# Patient Record
Sex: FEMALE | Race: WHITE | Employment: FULL TIME | URBAN - METROPOLITAN AREA
[De-identification: names, ages, dates, MRNs, and addresses within clinical notes are randomized per-mention and may not be internally consistent; named-entity substitution may affect disease eponyms.]

---

## 2018-03-15 LAB
AFP, MATERNAL, EXTERNAL: NORMAL
ANTIBODY SCREEN, EXTERNAL: NORMAL
CHLAMYDIA, EXTERNAL: NORMAL
CYSTIC FIBROSIS, EXTERNAL: NORMAL
HBSAG, EXTERNAL: NORMAL
HCT, EXTERNAL: 38.2
HGB EVAL, EXTERNAL: NORMAL
HGB, EXTERNAL: 12.7
N. GONORRHEA, EXTERNAL: NORMAL
NT, EXTERNAL: NORMAL
PAP SMEAR, EXTERNAL: NORMAL
PLATELET CNT,   EXTERNAL: 166
RPR, EXTERNAL: NORMAL
RUBELLA, EXTERNAL: NORMAL
TYPE, ABO & RH, EXTERNAL: NORMAL
URINALYSIS, EXTERNAL: NORMAL

## 2018-06-18 ENCOUNTER — OFFICE VISIT (OUTPATIENT)
Dept: OBGYN CLINIC | Age: 28
End: 2018-06-18

## 2018-06-18 VITALS
HEIGHT: 61 IN | BODY MASS INDEX: 31.98 KG/M2 | SYSTOLIC BLOOD PRESSURE: 116 MMHG | DIASTOLIC BLOOD PRESSURE: 60 MMHG | WEIGHT: 169.4 LBS

## 2018-06-18 DIAGNOSIS — Z34.00 SUPERVISION OF NORMAL FIRST PREGNANCY, ANTEPARTUM: ICD-10-CM

## 2018-06-18 DIAGNOSIS — Z34.02 ENCOUNTER FOR SUPERVISION OF NORMAL FIRST PREGNANCY IN SECOND TRIMESTER: Primary | ICD-10-CM

## 2018-06-18 RX ORDER — HYDROGEN PEROXIDE 3 %
SOLUTION, NON-ORAL MISCELLANEOUS DAILY
COMMUNITY
End: 2019-06-17

## 2018-06-18 NOTE — PATIENT INSTRUCTIONS
Weeks 22 to 26 of Your Pregnancy: Care Instructions  Your Care Instructions    As you enter your 7th month of pregnancy at week 26, your baby's lungs are growing stronger and getting ready to breathe. You may notice that your baby responds to the sound of your or your partner's voice. You may also notice that your baby does less turning and twisting and more squirming or jerking. Jerking often means that your baby has the hiccups. Hiccups are perfectly normal and are only temporary. You may want to think about attending a childbirth preparation class. This is also a good time to start thinking about whether you want to have pain medicine during labor. Most pregnant women are tested for gestational diabetes between weeks 25 and 28. Gestational diabetes occurs when your blood sugar level gets too high when you're pregnant. The test is important, because you can have gestational diabetes and not know it. But the condition can cause problems for your baby. Follow-up care is a key part of your treatment and safety. Be sure to make and go to all appointments, and call your doctor if you are having problems. It's also a good idea to know your test results and keep a list of the medicines you take. How can you care for yourself at home? Ease discomfort from your baby's kicking  · Change your position. Sometimes this will cause your baby to change position too. · Take a deep breath while you raise your arm over your head. Then breathe out while you drop your arm. Do Kegel exercises to prevent urine from leaking  · You can do Kegel exercises while you stand or sit. ¨ Squeeze the same muscles you would use to stop your urine. Your belly and thighs should not move. ¨ Hold the squeeze for 3 seconds, and then relax for 3 seconds. ¨ Start with 3 seconds. Then add 1 second each week until you are able to squeeze for 10 seconds. ¨ Repeat the exercise 10 to 15 times for each session.  Do three or more sessions each day.  Ease or reduce swelling in your feet, ankles, hands, and fingers  · If your fingers are puffy, take off your rings. · Do not eat high-salt foods, such as potato chips. · Prop up your feet on a stool or couch as much as possible. Sleep with pillows under your feet. · Do not stand for long periods of time or wear tight shoes. · Wear support stockings. Where can you learn more? Go to http://valerie-quique.info/. Enter G264 in the search box to learn more about \"Weeks 22 to 26 of Your Pregnancy: Care Instructions. \"  Current as of: March 16, 2017  Content Version: 11.4  © 8100-5075 Healthwise, [employer]. Care instructions adapted under license by [employer] (which disclaims liability or warranty for this information). If you have questions about a medical condition or this instruction, always ask your healthcare professional. [place] [address] any warranty or liability for your use of this information.

## 2018-06-18 NOTE — PROGRESS NOTES
Current pregnancy history:    Michelle Chavis is a 33 y.o. female who presents for the evaluation of pregnancy. She is a late transfer with records today. Patient's last menstrual period was 2017. LMP history:  The date of her LMP is certain. Her last menstrual period was normal and lasted for 4 to 5 days. A urine pregnancy test was positive weeks ago. She was not on the pill at conception. Based on her LMP, her EDC is 18 and her EGA is 25 weeks,5 days. Her menstrual cycles are regular and occur approximately every 28 days and range from 3 to 5 days. The last menses lasted the usual number of days. Ultrasound data:  Her first ultrasound was done by [facility] on 3/15/2018, when she was 12 weeks for the NT. Pregnancy symptoms:    Since her LMP she has experienced  urinary frequency, breast tenderness, and nausea. She has not been vomiting over the last few weeks. Associated signs and symptoms which she denies: dysuria, discharge, vaginal bleeding. She states she has gained weight:  Approximately 5 pounds over the last few weeks. Relevant past pregnancy history:   She has the followiing pregnancy history: This is her first pregnancy. She has no history of  delivery. Relevant past medical history:(relevant to this pregnancy): noncontributory. 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 Substance history: negative for alcohol, tobacco and street drugs. Positive for nothing. Exposure history: There is/are no indoor cat/s in the home. The patient was instructed to not change the cat litter. She denies close contact with children on a regular basis. She has had chicken pox or the vaccine in the past.   Patient denies issues with domestic violence.      Genetic Screening/Teratology Counseling: (Includes patient, baby's father, or anyone in either family with:)  1.  Patient's age >/= 28 at [facility]?-- no  .   2. [facility]alassemia ([facility] [place], [address], or  background): MCV<80?--no.     3.  Neural tube defect (meningomyelocele, spina bifida, anencephaly)?--no.   4.  Congenital heart defect?--no.  5.  Down syndrome?--no.   6.  Jacky-Sachs ([facility], [place])?--no.   7.  Canavan's Disease?--no.   8.  Familial Dysautonomia?--no.   9.  Sickle cell disease or trait ()? --no   The patient has not been tested for sickle trait  10. Hemophilia or other blood disorders?--no. 11.  Muscular dystrophy?--no. 12.  Cystic fibrosis?--no. 13.  Sutton's Chorea?--no. 14.  Mental retardation/autism (if yes was person tested for Fragile X)?--no. 15.  Other inherited genetic or chromosomal disorder?--no. 12.  Maternal metabolic disorder (DM, PKU, etc)?--no. 17.  Patient or FOB with a child with a birth defect not listed above?--no.  17a. Patient or FOB with a birth defect themselves?--no. 18.  Recurrent pregnancy loss, or stillbirth?--no. 19.  Any medications since LMP other than prenatal vitamins (include vitamins, supplements, OTC meds, drugs, alcohol)?--no. 20.  Any other genetic/environmental exposure to discuss?--no. Infection History:  1. Lives with someone with TB or TB exposed?--no.   2.  Patient or partner has history of genital herpes?--no.  3.  Rash or viral illness since LMP?--no.    4.  History of STD (GC, CT, HPV, syphilis, HIV)? --no   5. Other: OTHER? First prenatal labs scanned in under media tab and abstracted. She did [employer] was negative and had a NT US with MFM which was normal.  All labs, including CF and MSAFP were all normal.  FHR today was 140. History reviewed. No pertinent past medical history. Past Surgical History:   Procedure Laterality Date    HX COLPOSCOPY  11/2016    neg path     Social History     Occupational History    Not on file.      Social History Main Topics    Smoking status: Never Smoker    Smokeless tobacco: Never Used    Alcohol use Not on file    Drug use: Not on file    Sexual activity: Yes     Partners: Male     Birth control/ protection: None     Family History   Problem Relation Age of Onset    Hypertension Mother     Lupus Sister     Breast Cancer Maternal Grandmother      Breast, kidney and bone CA       Allergies   Allergen Reactions    Sulfa (Sulfonamide Antibiotics) Rash     Prior to Admission medications    Medication Sig Start Date End Date Taking? Authorizing Provider   prenatal vit calc,iron,folic (PRENATAL VITAMIN PO) Take  by mouth. Yes Historical Provider   esomeprazole (NEXIUM) 20 mg capsule Take  by mouth daily.    Yes Historical Provider        Review of Systems: History obtained from the patient  Constitutional: negative for weight loss, fever, night sweats  HEENT: negative for hearing loss, earache, congestion, snoring, sorethroat  CV: negative for chest pain, palpitations, edema  Resp: negative for cough, shortness of breath, wheezing  Breast: negative for breast lumps, nipple discharge, galactorrhea  GI: negative for change in bowel habits, abdominal pain, black or bloody stools  : negative for frequency, dysuria, hematuria, vaginal discharge  MSK: negative for back pain, joint pain, muscle pain  Skin: negative for itching, rash, hives  Neuro: negative for dizziness, headache, confusion, weakness  Psych: negative for anxiety, depression, change in mood  Heme/lymph: negative for bleeding, bruising, pallor    Objective:  Visit Vitals    /60 (BP 1 Location: Left arm, BP Patient Position: Sitting)    Ht 5' 1\" (1.549 m)    Wt 169 lb 6.4 oz (76.8 kg)    LMP 12/20/2017    BMI 32.01 kg/m2       Physical Exam:   PHYSICAL EXAMINATION    Constitutional  · Appearance: well-nourished, well developed, alert, in no acute distress    HENT  · Head  · Face: appears normal  · Eyes: appear normal  · Ears: normal  · Mouth: normal  · Lips: no lesions    Neck  · Inspection/Palpation: normal appearance, no masses or tenderness  · Lymph Nodes: no lymphadenopathy present  · Thyroid: gland size normal, nontender, no nodules or masses present on palpation    Chest  · Respiratory Effort: breathing unlabored  · Auscultation: normal breath sounds    Cardiovascular  · Heart:  · Auscultation: regular rate and rhythm without murmur    Breasts  · Inspection of Breasts: breasts symmetrical, no skin changes, no discharge present, nipple appearance normal, no skin retraction present  · Palpation of Breasts and Axillae: no masses present on palpation, no breast tenderness  · Axillary Lymph Nodes: no lymphadenopathy present    Gastrointestinal  · Abdominal Examination: abdomen non-tender to palpation, normal bowel sounds, no masses present  · Liver and spleen: no hepatomegaly present, spleen not palpable  · Hernias: no hernias identified    Genitourinary  · External Genitalia: normal appearance for age, no discharge present, no tenderness present, no inflammatory lesions present, no masses present, no atrophy present  · Vagina: normal vaginal vault without central or paravaginal defects, no discharge present, no inflammatory lesions present, no masses present  · Bladder: non-tender to palpation  · Urethra: appears normal  · Cervix: normal   · Uterus: enlarged, normal shape, soft  · Adnexa: no adnexal tenderness present, no adnexal masses present  · Perineum: perineum within normal limits, no evidence of trauma, no rashes or skin lesions present  · Anus: anus within normal limits, no hemorrhoids present  · Inguinal Lymph Nodes: no lymphadenopathy present    Skin  · General Inspection: no rash, no lesions identified    Neurologic/Psychiatric  · Mental Status:  · Orientation: grossly oriented to person, place and time  · Mood and Affect: mood normal, affect appropriate    Assessment:   Intrauterine pregnancy with the following problems identified: \  EDC 9/26/2018 by D=US  25 week transfer        Plan:     Offered CF testing, CVS, Nuchal Translucency, MSAFP, amnio, and discussed NIPT  Course of pregnancy discussed including visit schedule, routine U/S, glucola testing, etc.  Avoid alcoholic beverages and illicit/recreational drugs use  Take prenatal vitamins or folic acid daily. Hospital and practice style discussed with coverage system. Discussed nutrition, toxoplasmosis precautions, sexual activity, exercise, need for influenza vaccine, environmental and work hazards, travel advice, screen for domestic violence, need for seat belts. Discussed seafood, unpasteurized dairy products, deli meat, artificial sweeteners, and caffeine. Information on prenatal classes/breastfeeding given. Information on circumcision given  Patient encouraged not to smoke. Discussed current prescription drug use. Given medication list.  Discussed the use of over the counter medications and chemicals. Route of delivery discussed, including risks, benefits, and alternatives of  versus repeat LTCS. Pt understands risk of hemorrhage during pregnancy and post delivery and would accept blood products if necessary in life-threatening emergencies  glucola next visit    Handouts given to pt.

## 2018-06-18 NOTE — MR AVS SNAPSHOT
[address] [facility] [address] [facility] 
[phone] Patient: Catrachito Velazquez MRN: [id number] Visit Information Date & Time Provider Department Dept. Phone Encounter #  
 6/18/2018  1:40 PM MD Gómez [address] [id number] Upcoming Health Maintenance Date Due  
 PAP AKA CERVICAL CYTOLOGY 3/16/2010 Influenza Age 5 to Adult 8/1/2018 Allergies as of 6/18/2018  Review Complete On: 6/18/2018 By: April Gee Severity Noted Reaction Type Reactions Sulfa (Sulfonamide Antibiotics)  06/18/2018    Rash Current Immunizations  Never Reviewed No immunizations on file. Not reviewed this visit Vitals BP Height(growth percentile) Weight(growth percentile) LMP BMI OB Status 116/60 (BP 1 Location: Left arm, BP Patient Position: Sitting) 5' 1\" (1.549 m) 169 lb 6.4 oz (76.8 kg) 12/20/2017 32.01 kg/m2 Pregnant Smoking Status Never Smoker BMI and BSA Data Body Mass Index Body Surface Area 32.01 kg/m 2 1.82 m 2 Your Updated Medication List  
  
   
This list is accurate as of 6/18/18  2:14 PM.  Always use your most recent med list.  
  
  
  
  
 NexIUM 20 mg capsule Generic drug:  esomeprazole Take  by mouth daily. PRENATAL VITAMIN PO Take  by mouth. Patient Instructions Weeks 22 to 26 of Your Pregnancy: Care Instructions Your Care Instructions As you enter your 7th month of pregnancy at week 26, your baby's lungs are growing stronger and getting ready to breathe. You may notice that your baby responds to the sound of your or your partner's voice. You may also notice that your baby does less turning and twisting and more squirming or jerking. Jerking often means that your baby has the hiccups. Hiccups are perfectly normal and are only temporary. You may want to think about attending a childbirth preparation class. This is also a good time to start thinking about whether you want to have pain medicine during labor. Most pregnant women are tested for gestational diabetes between weeks 25 and 28. Gestational diabetes occurs when your blood sugar level gets too high when you're pregnant. The test is important, because you can have gestational diabetes and not know it. But the condition can cause problems for your baby. Follow-up care is a key part of your treatment and safety. Be sure to make and go to all appointments, and call your doctor if you are having problems. It's also a good idea to know your test results and keep a list of the medicines you take. How can you care for yourself at home? Ease discomfort from your baby's kicking · Change your position. Sometimes this will cause your baby to change position too. · Take a deep breath while you raise your arm over your head. Then breathe out while you drop your arm. Do Kegel exercises to prevent urine from leaking · You can do Kegel exercises while you stand or sit. ¨ Squeeze the same muscles you would use to stop your urine. Your belly and thighs should not move. ¨ Hold the squeeze for 3 seconds, and then relax for 3 seconds. ¨ Start with 3 seconds. Then add 1 second each week until you are able to squeeze for 10 seconds. ¨ Repeat the exercise 10 to 15 times for each session. Do three or more sessions each day. Ease or reduce swelling in your feet, ankles, hands, and fingers · If your fingers are puffy, take off your rings. · Do not eat high-salt foods, such as potato chips. · Prop up your feet on a stool or couch as much as possible. Sleep with pillows under your feet. · Do not stand for long periods of time or wear tight shoes. · Wear support stockings. Where can you learn more? Go to http://valerie-quique.info/. Enter G264 in the search box to learn more about \"Weeks 22 to 26 of Your Pregnancy: Care Instructions. \" Current as of: March 16, 2017 Content Version: 11.4 © 2546-5563 Healthwise, Incorporated. Care instructions adapted under license by [employer] (which disclaims liability or warranty for this information). If you have questions about a medical condition or this instruction, always ask your healthcare professional. [address] any warranty or liability for your use of this information. Introducing [facility]! Nicole James introduces Gun.io patient portal. Now you can access parts of your medical record, email your doctor's office, and request medication refills online. 1. In your internet browser, go to [email or web address] 2. Click on the First Time User? Click Here link in the Sign In box. You will see the New Member Sign Up page. 3. Enter your Gun.io Access Code exactly as it appears below. You will not need to use this code after youve completed the sign-up process. If you do not sign up before the expiration date, you must request a new code. · Gun.io Access Code: [email or web address] Expires: 9/16/2018  2:14 PM 
 
4. Enter the last four digits of your Social Security Number (xxxx) and Date of Birth (mm/dd/yyyy) as indicated and click Submit. You will be taken to the next sign-up page. 5. Create a Gun.io ID. This will be your Gun.io login ID and cannot be changed, so think of one that is secure and easy to remember. 6. Create a Gun.io password. You can change your password at any time. 7. Enter your Password Reset Question and Answer. This can be used at a later time if you forget your password. 8. Enter your e-mail address. You will receive e-mail notification when new information is available in [address] 9. Click Sign Up. You can now view and download portions of your medical record. 10. Click the Download Summary menu link to download a portable copy of your medical information. If you have questions, please visit the Frequently Asked Questions section of the mygall website. Remember, mygall is NOT to be used for urgent needs. For medical emergencies, dial 911. Now available from your iPhone and Android! Please provide this summary of care documentation to your next provider. If you have any questions after today's visit, please call [phone].

## 2018-07-02 LAB — GTT, 1 HR, GLUCOLA, EXTERNAL: NORMAL

## 2018-07-09 ENCOUNTER — ROUTINE PRENATAL (OUTPATIENT)
Dept: OBGYN CLINIC | Age: 28
End: 2018-07-09

## 2018-07-09 VITALS — SYSTOLIC BLOOD PRESSURE: 102 MMHG | WEIGHT: 170 LBS | BODY MASS INDEX: 32.12 KG/M2 | DIASTOLIC BLOOD PRESSURE: 64 MMHG

## 2018-07-09 DIAGNOSIS — Z34.00 SUPERVISION OF NORMAL FIRST PREGNANCY, ANTEPARTUM: Primary | ICD-10-CM

## 2018-07-09 DIAGNOSIS — Z3A.28 28 WEEKS GESTATION OF PREGNANCY: ICD-10-CM

## 2018-07-09 DIAGNOSIS — Z23 ENCOUNTER FOR IMMUNIZATION: ICD-10-CM

## 2018-07-09 LAB
HCT, EXTERNAL: 34.3
HGB, EXTERNAL: 11.5
HIV, EXTERNAL: NORMAL
PLATELET CNT,   EXTERNAL: 131

## 2018-07-09 NOTE — PROGRESS NOTES
Glucola and Tdap today. Rh pos. Check varicella  Baby moving  Disc consent.  Considering primary CS  Disc peds/classes - needs breast shells - going to class in August

## 2018-07-09 NOTE — PROGRESS NOTES
Problem List  Date Reviewed: 6/18/2018          Codes Class Noted    Supervision of normal first pregnancy, antepartum ICD-10-CM: Z34.00  ICD-9-CM: V22.0  6/18/2018    Overview Addendum 6/18/2018  2:51 PM by April Gee     Late transfer  [employer] First screen neg 3/15/18 and NT US WNL                   Administered TDAP vaccine per MD order. Patient consent signed. Injection given IM in left deltoid. Patient tolertated well, no complications, no side effects.

## 2018-07-10 LAB
ERYTHROCYTE [DISTWIDTH] IN BLOOD BY AUTOMATED COUNT: 13.5 % (ref 12.3–15.4)
GLUCOSE 1H P 50 G GLC PO SERPL-MCNC: 80 MG/DL (ref 65–139)
HCT VFR BLD AUTO: 34.3 % (ref 34–46.6)
HGB BLD-MCNC: 11.5 G/DL (ref 11.1–15.9)
HIV 1+2 AB+HIV1 P24 AG SERPL QL IA: NON REACTIVE
MCH RBC QN AUTO: 31.6 PG (ref 26.6–33)
MCHC RBC AUTO-ENTMCNC: 33.5 G/DL (ref 31.5–35.7)
MCV RBC AUTO: 94 FL (ref 79–97)
PLATELET # BLD AUTO: 131 X10E3/UL (ref 150–379)
RBC # BLD AUTO: 3.64 X10E6/UL (ref 3.77–5.28)
VZV IGG SER IA-ACNC: 722 INDEX
WBC # BLD AUTO: 9.5 X10E3/UL (ref 3.4–10.8)

## 2018-07-11 DIAGNOSIS — Z34.00 SUPERVISION OF NORMAL FIRST PREGNANCY, ANTEPARTUM: ICD-10-CM

## 2018-07-24 ENCOUNTER — ROUTINE PRENATAL (OUTPATIENT)
Dept: OBGYN CLINIC | Age: 28
End: 2018-07-24

## 2018-07-24 VITALS — BODY MASS INDEX: 33.63 KG/M2 | SYSTOLIC BLOOD PRESSURE: 110 MMHG | DIASTOLIC BLOOD PRESSURE: 64 MMHG | WEIGHT: 178 LBS

## 2018-07-24 DIAGNOSIS — Z34.00 SUPERVISION OF NORMAL FIRST PREGNANCY, ANTEPARTUM: Primary | ICD-10-CM

## 2018-07-24 NOTE — PROGRESS NOTES
Mrs. Efraín Mcbride denies any LOF, vaginal bleeding or discharge. She feels baby moving and \"tightness\" in lower abdomen during the evenings.    FHR: 145 bpm  Fundal height: 30 cm

## 2018-07-24 NOTE — PROGRESS NOTES
Problem List  Date Reviewed: 7/9/2018          Codes Class Noted    Supervision of normal first pregnancy, antepartum ICD-10-CM: Z34.00  ICD-9-CM: V22.0  6/18/2018    Overview Addendum 7/11/2018 11:46 AM by Steven May LPN     Late transfer  [employer] First screen neg 3/15/18 and NT US WNL  Varicella Immune  Platelets [email or web address] @32wks

## 2018-07-24 NOTE — PATIENT INSTRUCTIONS
Weeks 30 to 32 of Your Pregnancy: Care Instructions  Your Care Instructions    You have made it to the final months of your pregnancy. By now, your baby is really starting to look like a baby, with hair and plump skin. As you enter the final weeks of pregnancy, the reality of having a baby may start to set in. This is the time to settle on a name, get your household in order, set up a safe nursery, and find quality  if needed. Doing these things in advance will allow you to focus on caring for and enjoying your new baby. You may also want to have a tour of your hospital's labor and delivery unit to get a better idea of what to expect while you are in the hospital.  During these last months, it is very important to take good care of yourself and pay attention to what your body needs. If your doctor says it is okay for you to work, don't push yourself too hard. Use the tips provided in this care sheet to ease heartburn and care for varicose veins. If you haven't already had the Tdap shot during this pregnancy, talk to your doctor about getting it. It will help protect your  against pertussis infection. Follow-up care is a key part of your treatment and safety. Be sure to make and go to all appointments, and call your doctor if you are having problems. It's also a good idea to know your test results and keep a list of the medicines you take. How can you care for yourself at home? Pay attention to your baby's movements  · You should feel your baby move several times every day. · Your baby now turns less, and kicks and jabs more. · Your baby sleeps 20 to 45 minutes at a time and is more active at certain times of day. · If your doctor wants you to count your baby's kicks:  ¨ Empty your bladder, and lie on your side or relax in a comfortable chair. ¨ Write down your start time. ¨ Pay attention only to your baby's movements. Count any movement except hiccups.   ¨ After you have counted 10 movements, write down your stop time. ¨ Write down how many minutes it took for your baby to move 10 times. ¨ If an hour goes by and you have not recorded 10 movements, have something to eat or drink and then count for another hour. If you do not record 10 movements in either hour, call your doctor. Ease heartburn  · Eat small, frequent meals. · Do not eat chocolate, peppermint, or very spicy foods. Avoid drinks with caffeine, such as coffee, tea, and sodas. · Avoid bending over or lying down after meals. · Talk a short walk after you eat. · If heartburn is a problem at night, do not eat for 2 hours before bedtime. · Take antacids like Mylanta, Maalox, Rolaids, or Tums. Do not take antacids that have sodium bicarbonate. Care for varicose veins  · Varicose veins are blood vessels that stretch out with the extra blood during pregnancy. Your legs may ache or throb. Most varicose veins will go away after the birth. · Avoid standing for long periods of time. Sit with your legs crossed at the ankles, not the knees. · Sit with your feet propped up. · Avoid tight clothing or stockings. Wear support hose. · Exercise regularly. Try walking for at least 30 minutes a day. Where can you learn more? Go to http://valerie-quique.info/. Enter Q751 in the search box to learn more about \"Weeks 30 to 32 of Your Pregnancy: Care Instructions. \"  Current as of: November 21, 2017  Content Version: 11.7  © 2423-5383 [employer]. Care instructions adapted under license by [employer] (which disclaims liability or warranty for this information). If you have questions about a medical condition or this instruction, always ask your healthcare professional. [place] [address] any warranty or liability for your use of this information.

## 2018-08-06 ENCOUNTER — ROUTINE PRENATAL (OUTPATIENT)
Dept: OBGYN CLINIC | Age: 28
End: 2018-08-06

## 2018-08-06 VITALS — SYSTOLIC BLOOD PRESSURE: 110 MMHG | DIASTOLIC BLOOD PRESSURE: 60 MMHG | WEIGHT: 186 LBS | BODY MASS INDEX: 35.14 KG/M2

## 2018-08-06 DIAGNOSIS — D69.6 THROMBOCYTOPENIA, UNSPECIFIED (HCC): ICD-10-CM

## 2018-08-06 DIAGNOSIS — Z34.00 SUPERVISION OF NORMAL FIRST PREGNANCY, ANTEPARTUM: Primary | ICD-10-CM

## 2018-08-06 LAB
HCT, EXTERNAL: 34.7
HGB, EXTERNAL: 11.1
PLATELET CNT,   EXTERNAL: 125

## 2018-08-06 NOTE — PROGRESS NOTES
Problem List  Date Reviewed: 7/24/2018          Codes Class Noted    Supervision of normal first pregnancy, antepartum ICD-10-CM: Z34.00  ICD-9-CM: V22.0  6/18/2018    Overview Addendum 7/11/2018 11:46 AM by Priyanka Shah LPN     Late transfer  [employer] First screen neg 3/15/18 and NT US WNL  Varicella Immune  Platelets Christina@"[email or web address]- [place] @32wks

## 2018-08-06 NOTE — PATIENT INSTRUCTIONS
Weeks 30 to 32 of Your Pregnancy: Care Instructions  Your Care Instructions    You have made it to the final months of your pregnancy. By now, your baby is really starting to look like a baby, with hair and plump skin. As you enter the final weeks of pregnancy, the reality of having a baby may start to set in. This is the time to settle on a name, get your household in order, set up a safe nursery, and find quality  if needed. Doing these things in advance will allow you to focus on caring for and enjoying your new baby. You may also want to have a tour of your hospital's labor and delivery unit to get a better idea of what to expect while you are in the hospital.  During these last months, it is very important to take good care of yourself and pay attention to what your body needs. If your doctor says it is okay for you to work, don't push yourself too hard. Use the tips provided in this care sheet to ease heartburn and care for varicose veins. If you haven't already had the Tdap shot during this pregnancy, talk to your doctor about getting it. It will help protect your  against pertussis infection. Follow-up care is a key part of your treatment and safety. Be sure to make and go to all appointments, and call your doctor if you are having problems. It's also a good idea to know your test results and keep a list of the medicines you take. How can you care for yourself at home? Pay attention to your baby's movements  · You should feel your baby move several times every day. · Your baby now turns less, and kicks and jabs more. · Your baby sleeps 20 to 45 minutes at a time and is more active at certain times of day. · If your doctor wants you to count your baby's kicks:  ¨ Empty your bladder, and lie on your side or relax in a comfortable chair. ¨ Write down your start time. ¨ Pay attention only to your baby's movements. Count any movement except hiccups.   ¨ After you have counted 10 movements, write down your stop time. ¨ Write down how many minutes it took for your baby to move 10 times. ¨ If an hour goes by and you have not recorded 10 movements, have something to eat or drink and then count for another hour. If you do not record 10 movements in either hour, call your doctor. Ease heartburn  · Eat small, frequent meals. · Do not eat chocolate, peppermint, or very spicy foods. Avoid drinks with caffeine, such as coffee, tea, and sodas. · Avoid bending over or lying down after meals. · Talk a short walk after you eat. · If heartburn is a problem at night, do not eat for 2 hours before bedtime. · Take antacids like Mylanta, Maalox, Rolaids, or Tums. Do not take antacids that have sodium bicarbonate. Care for varicose veins  · Varicose veins are blood vessels that stretch out with the extra blood during pregnancy. Your legs may ache or throb. Most varicose veins will go away after the birth. · Avoid standing for long periods of time. Sit with your legs crossed at the ankles, not the knees. · Sit with your feet propped up. · Avoid tight clothing or stockings. Wear support hose. · Exercise regularly. Try walking for at least 30 minutes a day. Where can you learn more? Go to http://valerie-quique.info/. Enter N418 in the search box to learn more about \"Weeks 30 to 32 of Your Pregnancy: Care Instructions. \"  Current as of: November 21, 2017  Content Version: 11.7  © 8095-2128 [employer]. Care instructions adapted under license by [employer] (which disclaims liability or warranty for this information). If you have questions about a medical condition or this instruction, always ask your healthcare professional. [place] [address] any warranty or liability for your use of this information.

## 2018-08-07 DIAGNOSIS — Z34.00 SUPERVISION OF NORMAL FIRST PREGNANCY, ANTEPARTUM: ICD-10-CM

## 2018-08-07 LAB
ERYTHROCYTE [DISTWIDTH] IN BLOOD BY AUTOMATED COUNT: 13.9 % (ref 12.3–15.4)
HCT VFR BLD AUTO: 34.7 % (ref 34–46.6)
HGB BLD-MCNC: 11.1 G/DL (ref 11.1–15.9)
MCH RBC QN AUTO: 30.2 PG (ref 26.6–33)
MCHC RBC AUTO-ENTMCNC: 32 G/DL (ref 31.5–35.7)
MCV RBC AUTO: 94 FL (ref 79–97)
PLATELET # BLD AUTO: 125 X10E3/UL (ref 150–379)
RBC # BLD AUTO: 3.68 X10E6/UL (ref 3.77–5.28)
WBC # BLD AUTO: 11.5 X10E3/UL (ref 3.4–10.8)

## 2018-08-20 ENCOUNTER — ROUTINE PRENATAL (OUTPATIENT)
Dept: OBGYN CLINIC | Age: 28
End: 2018-08-20

## 2018-08-20 VITALS
DIASTOLIC BLOOD PRESSURE: 70 MMHG | BODY MASS INDEX: 34.6 KG/M2 | HEIGHT: 62 IN | WEIGHT: 188 LBS | SYSTOLIC BLOOD PRESSURE: 118 MMHG

## 2018-08-20 DIAGNOSIS — Z34.00 SUPERVISION OF NORMAL FIRST PREGNANCY, ANTEPARTUM: Primary | ICD-10-CM

## 2018-08-20 NOTE — PROGRESS NOTES
LIMITED OB SCAN  A SINGLE TERRELL BREECH 34W5D IUP IS SEEN. FETAL CARDIAC MOTION OBSERVED. LIMITED ANATOMY WAS VISUALIZED AND APPEARS WNL. APPROPRIATE FETAL GROWTH IS SEEN. SIZE=DATES. MARTA AND PLACENTA APPEAR WITHIN NORMAL LIMITS.

## 2018-08-20 NOTE — PATIENT INSTRUCTIONS
[employer] Help Desk: [phone]       Weeks 34 to 39 of Your Pregnancy: Care Instructions  Your Care Instructions    By now, your baby and your belly have grown quite large. It is almost time to give birth. A full-term pregnancy can deliver between 37 and 42 weeks. Your baby's lungs are almost ready to breathe air. The bones in your baby's head are now firm enough to protect it, but soft enough to move down through the birth canal.  You may feel excited, happy, anxious, or scared. You may wonder how you will know if you are in labor or what to expect during labor. Try to be flexible in your expectations of the birth. Because each birth is different, there is no way to know exactly what childbirth will be like for you. This care sheet will help you know what to expect and how to prepare. This may make your childbirth easier. If you haven't already had the Tdap shot during this pregnancy, talk to your doctor about getting it. It will help protect your  against pertussis infection. In the 36th week, most women have a test for group B streptococcus (GBS). GBS is a common bacteria that can live in the vagina and rectum. It can make your baby sick after birth. If you test positive, you will get antibiotics during labor. The medicine will keep your baby from getting the bacteria. Follow-up care is a key part of your treatment and safety. Be sure to make and go to all appointments, and call your doctor if you are having problems. It's also a good idea to know your test results and keep a list of the medicines you take. How can you care for yourself at home? Learn about pain relief choices  · Pain is different for every woman. Talk with your doctor about your feelings about pain. · You can choose from several types of pain relief. These include medicine or breathing techniques, as well as comfort measures. You can use more than one option.   · If you choose to have pain medicine during labor, talk to your doctor about your options. Some medicines lower anxiety and help with some of the pain. Others make your lower body numb so that you won't feel pain. · Be sure to tell your doctor about your pain medicine choice before you start labor or very early in your labor. You may be able to change your mind as labor progresses. · Rarely, a woman is put to sleep by medicine given through a mask or an IV. Labor and delivery  · The first stage of labor has three parts: early, active, and transition. ¨ Most women have early labor at home. You can stay busy or rest, eat light snacks, drink clear fluids, and start counting contractions. ¨ When talking during a contraction gets hard, you may be moving to active labor. During active labor, you should head for the hospital if you are not there already. ¨ You are in active labor when contractions come every 3 to 4 minutes and last about 60 seconds. Your cervix is opening more rapidly. ¨ If your water breaks, contractions will come faster and stronger. ¨ During transition, your cervix is stretching, and contractions are coming more rapidly. ¨ You may want to push, but your cervix might not be ready. Your doctor will tell you when to push. · The second stage starts when your cervix is completely opened and you are ready to push. ¨ Contractions are very strong to push the baby down the birth canal.  ¨ You will feel the urge to push. You may feel like you need to have a bowel movement. ¨ You may be coached to push with contractions. These contractions will be very strong, but you will not have them as often. You can get a little rest between contractions. ¨ You may be emotional and irritable. You may not be aware of what is going on around you. ¨ One last push, and your baby is born. · The third stage is when a few more contractions push out the placenta. This may take 30 minutes or less. · The fourth stage is the welcome recovery.  You may feel overwhelmed with emotions and exhausted but alert. This is a good time to start breastfeeding. Where can you learn more? Go to http://valerie-quique.info/. Enter W074 in the search box to learn more about \"Weeks 34 to 36 of Your Pregnancy: Care Instructions. \"  Current as of: November 21, 2017  Content Version: 11.7  © 5165-7297 [employer]. Care instructions adapted under license by [employer] (which disclaims liability or warranty for this information). If you have questions about a medical condition or this instruction, always ask your healthcare professional. Andrea Tejada any warranty or liability for your use of this information.

## 2018-08-20 NOTE — PROGRESS NOTES
US 31%, jb breech  Recheck plts next visit and confirm presentation  Baby moving  Pt desires primary CS if still breech at 39 weeks - CS posted for 9/19

## 2018-08-20 NOTE — MR AVS SNAPSHOT
[address] [facility] 
[phone] Patient: Frances Jama MRN: [id number] DTO:[id number] Visit Information Date & Time Provider Department Dept. Phone Encounter #  
 8/20/2018 11:00 AM MD Gómez Devries [phone] [id number]  
  
 9/4/2018 10:20 AM  
OB VISIT with MD [facility] Appt Note: 36wks GBS and platelets today AH  
 [address] [facility] [address] [facility] [phone]  
[facility] [address] [facility] Upcoming Health Maintenance Date Due  
 PAP AKA CERVICAL CYTOLOGY 3/16/2010 Influenza Age 5 to Adult 8/1/2018 Allergies as of 8/20/2018  Review Complete On: 8/20/2018 By: Mathew Ko Severity Noted Reaction Type Reactions Sulfa (Sulfonamide Antibiotics)  06/18/2018    Rash Current Immunizations  Never Reviewed Name Date Tdap 7/9/2018 Not reviewed this visit Vitals BP Height(growth percentile) Weight(growth percentile) LMP BMI OB Status 118/70 5' 2\" (1.575 m) 188 lb (85.3 kg) 12/20/2017 34.39 kg/m2 Pregnant Smoking Status Never Smoker Vitals History BMI and BSA Data Body Mass Index Body Surface Area  
 34.39 kg/m 2 1.93 m 2 Your Updated Medication List  
  
   
This list is accurate as of 8/20/18 11:03 AM.  Always use your most recent med list.  
  
  
  
  
 NexIUM 20 mg capsule Generic drug:  esomeprazole Take  by mouth daily. PRENATAL VITAMIN PO Take  by mouth. [facility] Instructions [facility]: [phone] Weeks 34 to 36 of Your Pregnancy: Care Instructions Your Care Instructions By now, your baby and your belly have grown quite large. It is almost time to give birth. A full-term pregnancy can deliver between 37 and 42 weeks. Your baby's lungs are almost ready to breathe air. The bones in your baby's head are now firm enough to protect it, but soft enough to move down through the birth canal. 
You may feel excited, happy, anxious, or scared. You may wonder how you will know if you are in labor or what to expect during labor. Try to be flexible in your expectations of the birth. Because each birth is different, there is no way to know exactly what childbirth will be like for you. This care sheet will help you know what to expect and how to prepare. This may make your childbirth easier. If you haven't already had the Tdap shot during this pregnancy, talk to your doctor about getting it. It will help protect your  against pertussis infection. In the 36th week, most women have a test for group B streptococcus (GBS). GBS is a common bacteria that can live in the vagina and rectum. It can make your baby sick after birth. If you test positive, you will get antibiotics during labor. The medicine will keep your baby from getting the bacteria. Follow-up care is a key part of your treatment and safety. Be sure to make and go to all appointments, and call your doctor if you are having problems. It's also a good idea to know your test results and keep a list of the medicines you take. How can you care for yourself at home? Learn about pain relief choices · Pain is different for every woman. Talk with your doctor about your feelings about pain. · You can choose from several types of pain relief. These include medicine or breathing techniques, as well as comfort measures. You can use more than one option. · If you choose to have pain medicine during labor, talk to your doctor about your options. Some medicines lower anxiety and help with some of the pain. Others make your lower body numb so that you won't feel pain.  
· Be sure to tell your doctor about your pain medicine choice before you start labor or very early in your labor. You may be able to change your mind as labor progresses. · Rarely, a woman is put to sleep by medicine given through a mask or an IV. Labor and delivery · The first stage of labor has three parts: early, active, and transition. ¨ Most women have early labor at home. You can stay busy or rest, eat light snacks, drink clear fluids, and start counting contractions. ¨ When talking during a contraction gets hard, you may be moving to active labor. During active labor, you should head for the hospital if you are not there already. ¨ You are in active labor when contractions come every 3 to 4 minutes and last about 60 seconds. Your cervix is opening more rapidly. ¨ If your water breaks, contractions will come faster and stronger. ¨ During transition, your cervix is stretching, and contractions are coming more rapidly. ¨ You may want to push, but your cervix might not be ready. Your doctor will tell you when to push. · The second stage starts when your cervix is completely opened and you are ready to push. ¨ Contractions are very strong to push the baby down the birth canal. 
¨ You will feel the urge to push. You may feel like you need to have a bowel movement. ¨ You may be coached to push with contractions. These contractions will be very strong, but you will not have them as often. You can get a little rest between contractions. ¨ You may be emotional and irritable. You may not be aware of what is going on around you. ¨ One last push, and your baby is born. · The third stage is when a few more contractions push out the placenta. This may take 30 minutes or less. · The fourth stage is the welcome recovery. You may feel overwhelmed with emotions and exhausted but alert. This is a good time to start breastfeeding. Where can you learn more? Go to http://valerie-quique.info/.  
Enter D152 in the search box to learn more about \"Weeks 34 to 39 of Your Pregnancy: Care Instructions. \" Current as of: November 21, 2017 Content Version: 11.7 © 2522-7519 [employer]. Care instructions adapted under license by [employer] (which disclaims liability or warranty for this information). If you have questions about a medical condition or this instruction, always ask your healthcare professional. [address] any warranty or liability for your use of this information. Introducing [facility]! Dear Ford Patel: 
Thank you for requesting a OTOY account. Our records indicate that you already have an active OTOY account. You can access your account anytime at [email or web address] Did you know that you can access your hospital and ER discharge instructions at any time in OTOY? You can also review all of your test results from your hospital stay or ER visit. Additional Information If you have questions, please visit the Frequently Asked Questions section of the OTOY website at [email or web address] Remember, OTOY is NOT to be used for urgent needs. For medical emergencies, dial 911. Now available from your iPhone and Android! Please provide this summary of care documentation to your next provider. If you have any questions after today's visit, please call [phone].

## 2018-09-04 ENCOUNTER — ROUTINE PRENATAL (OUTPATIENT)
Dept: OBGYN CLINIC | Age: 28
End: 2018-09-04

## 2018-09-04 VITALS — BODY MASS INDEX: 35.19 KG/M2 | WEIGHT: 192.4 LBS | DIASTOLIC BLOOD PRESSURE: 78 MMHG | SYSTOLIC BLOOD PRESSURE: 120 MMHG

## 2018-09-04 DIAGNOSIS — Z3A.36 36 WEEKS GESTATION OF PREGNANCY: ICD-10-CM

## 2018-09-04 DIAGNOSIS — Z34.00 SUPERVISION OF NORMAL FIRST PREGNANCY, ANTEPARTUM: Primary | ICD-10-CM

## 2018-09-04 LAB — GRBS, EXTERNAL: NEGATIVE

## 2018-09-04 NOTE — PROGRESS NOTES
Problem List  Date Reviewed: 2018          Codes Class Noted    Supervision of normal first pregnancy, antepartum ICD-10-CM: Z34.00  ICD-9-CM: V22.0  2018    Overview Addendum 2018 11:59 AM by Angel Reese     Late transfer  Gen[employer] First screen neg 3/15/18 and NT US WNL  Varicella Immune  Platelets [email or web address]; Platelets 636 @ 23BMN- Repeat plts 36 weeks   18. Pt notified.

## 2018-09-05 DIAGNOSIS — Z34.00 SUPERVISION OF NORMAL FIRST PREGNANCY, ANTEPARTUM: ICD-10-CM

## 2018-09-05 LAB
ERYTHROCYTE [DISTWIDTH] IN BLOOD BY AUTOMATED COUNT: 14 % (ref 12.3–15.4)
HCT VFR BLD AUTO: 34.2 % (ref 34–46.6)
HGB BLD-MCNC: 11 G/DL (ref 11.1–15.9)
MCH RBC QN AUTO: 30.2 PG (ref 26.6–33)
MCHC RBC AUTO-ENTMCNC: 32.2 G/DL (ref 31.5–35.7)
MCV RBC AUTO: 94 FL (ref 79–97)
PLATELET # BLD AUTO: 107 X10E3/UL (ref 150–379)
RBC # BLD AUTO: 3.64 X10E6/UL (ref 3.77–5.28)
WBC # BLD AUTO: 10.2 X10E3/UL (ref 3.4–10.8)

## 2018-09-07 ENCOUNTER — ROUTINE PRENATAL (OUTPATIENT)
Dept: OBGYN CLINIC | Age: 28
End: 2018-09-07

## 2018-09-07 ENCOUNTER — TELEPHONE (OUTPATIENT)
Dept: OBGYN CLINIC | Age: 28
End: 2018-09-07

## 2018-09-07 VITALS — WEIGHT: 192 LBS | BODY MASS INDEX: 35.12 KG/M2

## 2018-09-07 DIAGNOSIS — O36.8130 DECREASED FETAL MOVEMENTS IN THIRD TRIMESTER, SINGLE OR UNSPECIFIED FETUS: Primary | ICD-10-CM

## 2018-09-07 DIAGNOSIS — Z3A.37 37 WEEKS GESTATION OF PREGNANCY: ICD-10-CM

## 2018-09-07 NOTE — PROGRESS NOTES
Problem List  Date Reviewed: 2018          Codes Class Noted    Supervision of normal first pregnancy, antepartum ICD-10-CM: Z34.00  ICD-9-CM: V22.0  2018    Overview Addendum 2018  3:40 PM by Priyanka Shah LPN     Late transfer  Gen[employer] First screen neg 3/15/18 and NT US WNL  Varicella Immune  Platelets [email or web address]; [email or web address]; Platelets 443@ 47GKP - Repeat plts 37 weeks   18. Pt notified. [facility]  OFFICE PROCEDURE PROGRESS NOTE        Chart reviewed for the following:   Sabine GALLARDO, have reviewed the History, Physical and updated the Allergic reactions for [address] performed immediately prior to start of procedure:   Sabine GALLARDO, have performed the following reviews on [facility] prior to the start of the procedure:            * Patient was identified by name and date of birth   * Agreement on procedure being performed was verified  * Risks and Benefits explained to the patient  * Procedure site verified and marked as necessary  * Patient was positioned for comfort  * Consent was signed and verified     Time: 2:38pm      Date of procedure: 2018    Procedure performed by:  Prakash Turcios MD    Patient assisted by: self    How tolerated by patient: tolerated the procedure well with no complications    Post Procedural Pain Scale: 0 - No Hurt    Comments: none      . NST procedure note    [facility] is a ,  33 y.o. female [facility] whose LMP  was on  who presents for fetal non-stress test.    She is 37w2d and was monitored for 34 minutes and the FHR was reactive. NST Interpretation:    FHR baseline 150 bpm, variability moderate, accelerations present, decelerations Absent. Uterine contractions were absent. Rodger Kirkland was informed of the NST results and her questions were answered.     Disposition:    - return to clinic as scheduled  MARTA normal by ultrasound tech, breech    US was done today which showed:    LIMITED OB SCAN  A SINGLE 40W3D IUP IS SEEN. FETAL CARDIAC MOTION OBSERVED. LIMITED ANATOMY WAS VISUALIZED AND APPEARS WNL. FETA MOVEMENTS ARE VISUALIZED. MARTA MEASURES 13. 3CMS AND PLACENTA APPEARS WITHIN NORMAL LIMITS.

## 2018-09-07 NOTE — TELEPHONE ENCOUNTER
Call received at 351PM    33year old  37w2d pregnant patient last seen in the office on 18. Patient denies vaginal bleeding, ROM,contractions and reports decreased fetal movement. Patient reports that for the past two days she has been feeling decreased fetal movement. Patient reports drinking cold water and laying down and not really getting the baby to move more. Patient has felt the baby move today, but reports only 3-4 movements in the hour. Patient advised to come to the office now. Patient placed on the schedule to be seen today at 2:20Pm ( ok per GS)    Patient verbalized understanding.

## 2018-09-07 NOTE — PATIENT INSTRUCTIONS
Counting Your Baby's Kicks: Care Instructions  Your Care Instructions    Counting your baby's kicks is one way your doctor can tell that your baby is healthy. Most women-especially in a first pregnancy-feel their baby move for the first time between 16 and 22 weeks. The movement may feel like flutters rather than kicks. Your baby may move more at certain times of the day. When you are active, you may notice less kicking than when you are resting. At your prenatal visits, your doctor will ask whether the baby is active. In your last trimester, your doctor may ask you to count the number of times you feel your baby move. Follow-up care is a key part of your treatment and safety. Be sure to make and go to all appointments, and call your doctor if you are having problems. It's also a good idea to know your test results and keep a list of the medicines you take. How do you count fetal kicks? · A common method of checking your baby's movement is to count the number of kicks or moves you feel in 1 hour. Ten movements (such as kicks, flutters, or rolls) in 1 hour are normal. Some doctors suggest that you count in the morning until you get to 10 movements. Then you can quit for that day and start again the next day. · Pick your baby's most active time of day to count. This may be any time from morning to evening. · If you do not feel 10 movements in an hour, your baby may be sleeping. Wait for the next hour and count again. When should you call for help? Call your doctor now or seek immediate medical care if:    · You noticed that your baby has stopped moving or is moving much less than normal.    Watch closely for changes in your health, and be sure to contact your doctor if you have any problems. Where can you learn more? Go to http://valerie-quique.info/. Enter P160 in the search box to learn more about \"Counting Your Baby's Kicks: Care Instructions. \"  Current as of: November 21, 2017  Content Version: 11.7  © 1268-4947 [employer]. Care instructions adapted under license by [employer] (which disclaims liability or warranty for this information). If you have questions about a medical condition or this instruction, always ask your healthcare professional. [address] any warranty or liability for your use of this information.

## 2018-09-08 LAB — B-HEM STREP SPEC QL CULT: NEGATIVE

## 2018-09-11 ENCOUNTER — ROUTINE PRENATAL (OUTPATIENT)
Dept: OBGYN CLINIC | Age: 28
End: 2018-09-11

## 2018-09-11 VITALS — WEIGHT: 191 LBS | BODY MASS INDEX: 34.93 KG/M2

## 2018-09-11 DIAGNOSIS — D69.6 THROMBOCYTOPENIA, UNSPECIFIED (HCC): ICD-10-CM

## 2018-09-11 DIAGNOSIS — Z34.00 SUPERVISION OF NORMAL FIRST PREGNANCY, ANTEPARTUM: Primary | ICD-10-CM

## 2018-09-11 NOTE — PATIENT INSTRUCTIONS
Week 37 of Your Pregnancy: Care Instructions  Your Care Instructions    You are near the end of your pregnancy-and you're probably pretty uncomfortable. It may be harder to walk around. Lying down probably isn't comfortable either. You may have trouble getting to sleep or staying asleep. Most women deliver their babies between 40 and 41 weeks. This is a good time to think about packing a bag for the hospital with items you'll need. Then you'll be ready when labor starts. Follow-up care is a key part of your treatment and safety. Be sure to make and go to all appointments, and call your doctor if you are having problems. It's also a good idea to know your test results and keep a list of the medicines you take. How can you care for yourself at home? Learn about breastfeeding  · Breastfeeding is best for your baby and good for you. · Breast milk has antibodies to help your baby fight infections. · Mothers who breastfeed often lose weight faster, because making milk burns calories. · Learning the best ways to hold your baby will make breastfeeding easier. · Let your partner bathe and diaper the baby to keep your partner from feeling left out. Snuggle together when you breastfeed. · You may want to learn how to use a breast pump and store your milk. · If you choose to bottle feed, make the feeding feel like breastfeeding so you can bond with your baby. Always hold your baby and the bottle. Do not prop bottles or let your baby fall asleep with a bottle. Learn about crying  · It is common for babies to cry for 1 to 3 hours a day. Some cry more, some cry less. · Babies don't cry to make you upset or because you are a bad parent. · Crying is how your baby communicates. Your baby may be hungry; have gas; need a diaper change; or feel cold, warm, tired, lonely, or tense. Sometimes babies cry for unknown reasons. · If you respond to your baby's needs, he or she will learn to trust you.   · Try to stay calm when your baby cries. Your baby may get more upset if he or she senses that you are upset. Know how to care for your   · Your baby's umbilical cord stump will drop off on its own, usually between 1 and 2 weeks. To care for your baby's umbilical cord area:  ¨ Clean the area at the bottom of the cord 2 or 3 times a day. ¨ Pay special attention to the area where the cord attaches to the skin. ¨ Keep the diaper folded below the cord. ¨ Use a damp washcloth or cotton ball to sponge bathe your baby until the stump has come off. · Your baby's first dark stool is called meconium. After the meconium is passed, your baby will develop his or her own bowel pattern. ¨ Some babies, especially  babies, have several bowel movements a day. Others have one or two a day, or one every 2 to 3 days. ¨  babies often have loose, yellow stools. Formula-fed babies have more formed stools. ¨ If your baby's stools look like little pellets, he or she is constipated. After 2 days of constipation, call your baby's doctor. · If your baby will be circumcised, you can care for him at home. ¨ Gently rinse his penis with warm water after every diaper change. Do not try to remove the film that forms on the penis. This film will go away on its own. Pat dry. ¨ Put petroleum ointment, such as Vaseline, on the area of the diaper that will touch your baby's penis. This will keep the diaper from sticking to your baby. ¨ Ask the doctor about giving your baby acetaminophen (Tylenol) for pain. Where can you learn more? Go to http://valerie-quique.info/. Enter 68  97 in the search box to learn more about \"Week 37 of Your Pregnancy: Care Instructions. \"  Current as of: 2017  Content Version: 11.7  © 4042-2349 [employer]. Care instructions adapted under license by [employer] (which disclaims liability or warranty for this information).  If you have questions about a medical condition or this instruction, always ask your healthcare professional. [place] [address] any warranty or liability for your use of this information.

## 2018-09-11 NOTE — PROGRESS NOTES
CBC today. Baby moving.    plts 107k  Still breech  See me Monday to recheck plts prior to surgery  steriods tomorrow if plts low  Disc procedure

## 2018-09-11 NOTE — PROGRESS NOTES
Problem List  Date Reviewed: 2018          Codes Class Noted    Supervision of normal first pregnancy, antepartum ICD-10-CM: Z34.00  ICD-9-CM: V22.0  2018    Overview Addendum 2018  3:40 PM by Kayla Garrett LPN     Late transfer  [employer] First screen neg 3/15/18 and NT US WNL  Varicella Immune  Platelets [email or web address]; Platelets [email or web address]; Platelets 608@ 12AOC - Repeat plts 37 weeks   18. Pt notified.

## 2018-09-12 LAB
ERYTHROCYTE [DISTWIDTH] IN BLOOD BY AUTOMATED COUNT: 14 % (ref 12.3–15.4)
HCT VFR BLD AUTO: 34.1 % (ref 34–46.6)
HGB BLD-MCNC: 11.2 G/DL (ref 11.1–15.9)
MCH RBC QN AUTO: 30.6 PG (ref 26.6–33)
MCHC RBC AUTO-ENTMCNC: 32.8 G/DL (ref 31.5–35.7)
MCV RBC AUTO: 93 FL (ref 79–97)
PLATELET # BLD AUTO: 108 X10E3/UL (ref 150–379)
RBC # BLD AUTO: 3.66 X10E6/UL (ref 3.77–5.28)
WBC # BLD AUTO: 10.8 X10E3/UL (ref 3.4–10.8)

## 2018-09-17 ENCOUNTER — ROUTINE PRENATAL (OUTPATIENT)
Dept: OBGYN CLINIC | Age: 28
End: 2018-09-17

## 2018-09-17 VITALS — BODY MASS INDEX: 35.56 KG/M2 | SYSTOLIC BLOOD PRESSURE: 120 MMHG | DIASTOLIC BLOOD PRESSURE: 74 MMHG | WEIGHT: 194.4 LBS

## 2018-09-17 DIAGNOSIS — O99.113 BENIGN GESTATIONAL THROMBOCYTOPENIA IN THIRD TRIMESTER (HCC): ICD-10-CM

## 2018-09-17 DIAGNOSIS — D69.6 BENIGN GESTATIONAL THROMBOCYTOPENIA IN THIRD TRIMESTER (HCC): ICD-10-CM

## 2018-09-17 DIAGNOSIS — Z23 ENCOUNTER FOR IMMUNIZATION: ICD-10-CM

## 2018-09-17 DIAGNOSIS — Z3A.38 38 WEEKS GESTATION OF PREGNANCY: ICD-10-CM

## 2018-09-17 DIAGNOSIS — Z86.2 HISTORY OF THROMBOCYTOPENIA: ICD-10-CM

## 2018-09-17 DIAGNOSIS — O99.113 OTHER DISEASES OF THE BLOOD AND BLOOD-FORMING ORGANS AND CERTAIN DISORDERS INVOLVING THE IMMUNE MECHANISM COMPLICATING PREGNANCY, THIRD TRIMESTER: Primary | ICD-10-CM

## 2018-09-17 DIAGNOSIS — Z34.00 SUPERVISION OF NORMAL FIRST PREGNANCY, ANTEPARTUM: ICD-10-CM

## 2018-09-17 LAB — PLATELET CNT,   EXTERNAL: 110

## 2018-09-17 NOTE — PROGRESS NOTES
Problem List  Date Reviewed: 2018          Codes Class Noted    Supervision of normal first pregnancy, antepartum ICD-10-CM: Z34.00  ICD-9-CM: V22.0  2018    Overview Addendum 2018  4:10 PM by April Gee     Late transfer  Gen[employer] First screen neg 3/15/18 and NT US WNL  Varicella Immune  Platelets [email or web address]; Platelets [email or web address]; Platelets 586@ 18LWT - Repeat plts 37 weeks 108-18   18. Pt notified.

## 2018-09-17 NOTE — PROGRESS NOTES
Administered flu vaccine per MD order. Patient consent signed. Injection given IM in left deltoid. Patient tolerated well, no complications, no side effects.

## 2018-09-17 NOTE — PATIENT INSTRUCTIONS
Section: Before Your Surgery  What is a  section? A  section, or , is surgery to deliver your baby through a cut the doctor makes in your lower belly and uterus. This cut is also called an incision. In many cases, the doctor makes the cut just above the pubic hairline. In other cases, it runs from the belly button to the pubic hairline. Both cuts leave a scar. It most often fades with time. The surgery may be done while you are awake but your belly is numb. This lets you be awake for the birth of your baby. Less often, women need general anesthesia. This means you are asleep during the surgery. Most women go home about 3 days after the birth. You may feel better each day. But you will likely need about 6 weeks to fully recover. During the first few weeks you will need extra help with household chores. But you will be able to care for your baby. You can do things like breastfeed and change diapers. Follow-up care is a key part of your treatment and safety. Be sure to make and go to all appointments, and call your doctor if you are having problems. It's also a good idea to know your test results and keep a list of the medicines you take. What happens before surgery?   Surgery can be stressful. This information will help you understand what you can expect. And it will help you safely prepare for surgery.   Preparing for surgery    · Understand exactly what surgery is planned, along with the risks, benefits, and other options. · Tell your doctors ALL the medicines, vitamins, supplements, and herbal remedies you take. Some of these can increase the risk of bleeding or interact with anesthesia.     · If you take blood thinners, be sure to talk to your doctor. He or she will tell you if you should stop taking these medicines before your surgery.  Make sure that you understand exactly what your doctor wants you to do.     · Your doctor will tell you which medicines to take or stop before your surgery. You may need to stop taking certain medicines a week or more before surgery, so talk to your doctor as soon as you can.     · If you have an advance directive, let your doctor know. It may include a living will and a durable power of  for health care. Bring a copy to the hospital. If you don't have one, you may want to prepare one. It lets your doctor and loved ones know your health care wishes. Doctors advise that everyone prepare these papers before any type of surgery or procedure. What happens on the day of surgery? · Follow the instructions exactly about when to stop eating and drinking. If you don't, your surgery may be canceled. If your doctor told you to take your medicines on the day of surgery, take them with only a sip of water.     · Take a bath or shower before you come in for your surgery. Do not apply lotions, perfumes, deodorants, or nail polish.     · Do not shave the surgical site yourself.     · Take off all jewelry and piercings. And take out contact lenses, if you wear them.    At the hospital or surgery center   · Bring a picture ID.     · You will be kept comfortable and safe by your anesthesia provider. The anesthesia may make you sleep. Or it may just numb the area being worked on.     · The surgery will take about 1 hour. Going home   · Be sure you have someone to drive you home. Anesthesia and pain medicine make it unsafe for you to drive.     · You will be given more specific instructions about recovering from your surgery. They will cover things like diet, wound care, follow-up care, driving, and getting back to your normal routine. When should you call your doctor? · You have questions or concerns.     · You don't understand how to prepare for your surgery.     · You become ill before the surgery (such as fever, flu, or a cold).     · You need to reschedule or have changed your mind about having the surgery. Where can you learn more?   Go to http://valerie-quique.info/. Enter F523 in the search box to learn more about \" Section: Before Your Surgery. \"  Current as of: 2017  Content Version: 11.7  © 0582-7198 [employer]. Care instructions adapted under license by [employer] (which disclaims liability or warranty for this information). If you have questions about a medical condition or this instruction, always ask your healthcare professional. [place] [address] any warranty or liability for your use of this information.

## 2018-09-18 DIAGNOSIS — Z34.00 SUPERVISION OF NORMAL FIRST PREGNANCY, ANTEPARTUM: ICD-10-CM

## 2018-09-18 LAB
ERYTHROCYTE [DISTWIDTH] IN BLOOD BY AUTOMATED COUNT: 13.8 % (ref 12.3–15.4)
HCT VFR BLD AUTO: 32.9 % (ref 34–46.6)
HGB BLD-MCNC: 10.6 G/DL (ref 11.1–15.9)
MCH RBC QN AUTO: 29.8 PG (ref 26.6–33)
MCHC RBC AUTO-ENTMCNC: 32.2 G/DL (ref 31.5–35.7)
MCV RBC AUTO: 92 FL (ref 79–97)
PLATELET # BLD AUTO: 110 X10E3/UL (ref 150–379)
RBC # BLD AUTO: 3.56 X10E6/UL (ref 3.77–5.28)
WBC # BLD AUTO: 9.7 X10E3/UL (ref 3.4–10.8)

## 2018-09-19 ENCOUNTER — ANESTHESIA (OUTPATIENT)
Dept: LABOR AND DELIVERY | Age: 28
End: 2018-09-19
Payer: COMMERCIAL

## 2018-09-19 ENCOUNTER — HOSPITAL ENCOUNTER (INPATIENT)
Age: 28
LOS: 3 days | Discharge: HOME | End: 2018-09-22
Attending: OBSTETRICS & GYNECOLOGY | Admitting: OBSTETRICS & GYNECOLOGY
Payer: COMMERCIAL

## 2018-09-19 ENCOUNTER — ANESTHESIA EVENT (OUTPATIENT)
Dept: LABOR AND DELIVERY | Age: 28
End: 2018-09-19
Payer: COMMERCIAL

## 2018-09-19 DIAGNOSIS — G89.18 POST-OP PAIN: Primary | ICD-10-CM

## 2018-09-19 PROBLEM — Z34.90 PREGNANCY: Status: ACTIVE | Noted: 2018-09-19

## 2018-09-19 LAB
ABO + RH BLD: NORMAL
BASOPHILS # BLD: 0 K/UL (ref 0–0.1)
BASOPHILS NFR BLD: 0 % (ref 0–1)
BLOOD GROUP ANTIBODIES SERPL: NORMAL
DIFFERENTIAL METHOD BLD: ABNORMAL
EOSINOPHIL # BLD: 0 K/UL (ref 0–0.4)
EOSINOPHIL NFR BLD: 0 % (ref 0–7)
ERYTHROCYTE [DISTWIDTH] IN BLOOD BY AUTOMATED COUNT: 13.6 % (ref 11.5–14.5)
HCT VFR BLD AUTO: 33.4 % (ref 35–47)
HGB BLD-MCNC: 11.1 G/DL (ref 11.5–16)
IMM GRANULOCYTES # BLD: 0.1 K/UL (ref 0–0.04)
IMM GRANULOCYTES NFR BLD AUTO: 1 % (ref 0–0.5)
LYMPHOCYTES # BLD: 2.1 K/UL (ref 0.8–3.5)
LYMPHOCYTES NFR BLD: 22 % (ref 12–49)
MCH RBC QN AUTO: 30.8 PG (ref 26–34)
MCHC RBC AUTO-ENTMCNC: 33.2 G/DL (ref 30–36.5)
MCV RBC AUTO: 92.8 FL (ref 80–99)
MONOCYTES # BLD: 0.8 K/UL (ref 0–1)
MONOCYTES NFR BLD: 9 % (ref 5–13)
NEUTS SEG # BLD: 6.5 K/UL (ref 1.8–8)
NEUTS SEG NFR BLD: 68 % (ref 32–75)
NRBC # BLD: 0 K/UL (ref 0–0.01)
NRBC BLD-RTO: 0 PER 100 WBC
PLATELET # BLD AUTO: 122 K/UL (ref 150–400)
PMV BLD AUTO: 12 FL (ref 8.9–12.9)
RBC # BLD AUTO: 3.6 M/UL (ref 3.8–5.2)
SPECIMEN EXP DATE BLD: NORMAL
WBC # BLD AUTO: 9.6 K/UL (ref 3.6–11)

## 2018-09-19 PROCEDURE — 74011000250 HC RX REV CODE- 250

## 2018-09-19 PROCEDURE — 76010000391 HC C SECN FIRST 1 HR: Performed by: OBSTETRICS & GYNECOLOGY

## 2018-09-19 PROCEDURE — 74011250636 HC RX REV CODE- 250/636: Performed by: ANESTHESIOLOGY

## 2018-09-19 PROCEDURE — 86900 BLOOD TYPING SEROLOGIC ABO: CPT | Performed by: OBSTETRICS & GYNECOLOGY

## 2018-09-19 PROCEDURE — 75410000003 HC RECOV DEL/VAG/CSECN EA 0.5 HR: Performed by: OBSTETRICS & GYNECOLOGY

## 2018-09-19 PROCEDURE — 77030007866 HC KT SPN ANES BBMI -B: Performed by: ANESTHESIOLOGY

## 2018-09-19 PROCEDURE — 76815 OB US LIMITED FETUS(S): CPT | Performed by: OBSTETRICS & GYNECOLOGY

## 2018-09-19 PROCEDURE — 65270000029 HC RM PRIVATE

## 2018-09-19 PROCEDURE — 74011250636 HC RX REV CODE- 250/636

## 2018-09-19 PROCEDURE — 76060000078 HC EPIDURAL ANESTHESIA: Performed by: OBSTETRICS & GYNECOLOGY

## 2018-09-19 PROCEDURE — 36415 COLL VENOUS BLD VENIPUNCTURE: CPT | Performed by: OBSTETRICS & GYNECOLOGY

## 2018-09-19 PROCEDURE — 74011250636 HC RX REV CODE- 250/636: Performed by: OBSTETRICS & GYNECOLOGY

## 2018-09-19 PROCEDURE — 85025 COMPLETE CBC W/AUTO DIFF WBC: CPT | Performed by: OBSTETRICS & GYNECOLOGY

## 2018-09-19 RX ORDER — SODIUM CHLORIDE 0.9 % (FLUSH) 0.9 %
5-10 SYRINGE (ML) INJECTION AS NEEDED
Status: DISCONTINUED | OUTPATIENT
Start: 2018-09-19 | End: 2018-09-19 | Stop reason: HOSPADM

## 2018-09-19 RX ORDER — KETOROLAC TROMETHAMINE 30 MG/ML
30 INJECTION, SOLUTION INTRAMUSCULAR; INTRAVENOUS
Status: DISPENSED | OUTPATIENT
Start: 2018-09-19 | End: 2018-09-20

## 2018-09-19 RX ORDER — SODIUM CHLORIDE, SODIUM LACTATE, POTASSIUM CHLORIDE, CALCIUM CHLORIDE 600; 310; 30; 20 MG/100ML; MG/100ML; MG/100ML; MG/100ML
1000 INJECTION, SOLUTION INTRAVENOUS CONTINUOUS
Status: DISCONTINUED | OUTPATIENT
Start: 2018-09-19 | End: 2018-09-19 | Stop reason: HOSPADM

## 2018-09-19 RX ORDER — MORPHINE SULFATE 0.5 MG/ML
INJECTION, SOLUTION EPIDURAL; INTRATHECAL; INTRAVENOUS AS NEEDED
Status: DISCONTINUED | OUTPATIENT
Start: 2018-09-19 | End: 2018-09-19 | Stop reason: HOSPADM

## 2018-09-19 RX ORDER — IBUPROFEN 800 MG/1
800 TABLET ORAL
Status: DISCONTINUED | OUTPATIENT
Start: 2018-09-19 | End: 2018-09-22 | Stop reason: HOSPADM

## 2018-09-19 RX ORDER — FAMOTIDINE 10 MG/ML
INJECTION INTRAVENOUS AS NEEDED
Status: DISCONTINUED | OUTPATIENT
Start: 2018-09-19 | End: 2018-09-19 | Stop reason: HOSPADM

## 2018-09-19 RX ORDER — ONDANSETRON 2 MG/ML
INJECTION INTRAMUSCULAR; INTRAVENOUS AS NEEDED
Status: DISCONTINUED | OUTPATIENT
Start: 2018-09-19 | End: 2018-09-19 | Stop reason: HOSPADM

## 2018-09-19 RX ORDER — BUPIVACAINE HYDROCHLORIDE 7.5 MG/ML
INJECTION, SOLUTION EPIDURAL; RETROBULBAR AS NEEDED
Status: DISCONTINUED | OUTPATIENT
Start: 2018-09-19 | End: 2018-09-19 | Stop reason: HOSPADM

## 2018-09-19 RX ORDER — ONDANSETRON 4 MG/1
4 TABLET, ORALLY DISINTEGRATING ORAL
Status: ACTIVE | OUTPATIENT
Start: 2018-09-19 | End: 2018-09-20

## 2018-09-19 RX ORDER — HYDROCORTISONE ACETATE PRAMOXINE HCL 2.5; 1 G/100G; G/100G
CREAM TOPICAL AS NEEDED
Status: DISCONTINUED | OUTPATIENT
Start: 2018-09-19 | End: 2018-09-22 | Stop reason: HOSPADM

## 2018-09-19 RX ORDER — EPHEDRINE SULFATE 50 MG/ML
INJECTION, SOLUTION INTRAVENOUS AS NEEDED
Status: DISCONTINUED | OUTPATIENT
Start: 2018-09-19 | End: 2018-09-19 | Stop reason: HOSPADM

## 2018-09-19 RX ORDER — OXYCODONE HYDROCHLORIDE 5 MG/1
10 TABLET ORAL
Status: ACTIVE | OUTPATIENT
Start: 2018-09-19 | End: 2018-09-20

## 2018-09-19 RX ORDER — SIMETHICONE 80 MG
80 TABLET,CHEWABLE ORAL
Status: DISCONTINUED | OUTPATIENT
Start: 2018-09-19 | End: 2018-09-22 | Stop reason: HOSPADM

## 2018-09-19 RX ORDER — HYDROCODONE BITARTRATE AND ACETAMINOPHEN 5; 325 MG/1; MG/1
1 TABLET ORAL
Status: DISCONTINUED | OUTPATIENT
Start: 2018-09-19 | End: 2018-09-22 | Stop reason: HOSPADM

## 2018-09-19 RX ORDER — DOCUSATE SODIUM 100 MG/1
100 CAPSULE, LIQUID FILLED ORAL 2 TIMES DAILY
Status: DISCONTINUED | OUTPATIENT
Start: 2018-09-19 | End: 2018-09-22 | Stop reason: HOSPADM

## 2018-09-19 RX ORDER — SODIUM CHLORIDE, SODIUM LACTATE, POTASSIUM CHLORIDE, CALCIUM CHLORIDE 600; 310; 30; 20 MG/100ML; MG/100ML; MG/100ML; MG/100ML
INJECTION, SOLUTION INTRAVENOUS
Status: DISCONTINUED | OUTPATIENT
Start: 2018-09-19 | End: 2018-09-19 | Stop reason: HOSPADM

## 2018-09-19 RX ORDER — CEFAZOLIN SODIUM/WATER 2 G/20 ML
2 SYRINGE (ML) INTRAVENOUS ONCE
Status: COMPLETED | OUTPATIENT
Start: 2018-09-19 | End: 2018-09-19

## 2018-09-19 RX ORDER — OXYCODONE HYDROCHLORIDE 5 MG/1
5 TABLET ORAL
Status: ACTIVE | OUTPATIENT
Start: 2018-09-19 | End: 2018-09-20

## 2018-09-19 RX ORDER — HYDROCODONE BITARTRATE AND ACETAMINOPHEN 5; 325 MG/1; MG/1
2 TABLET ORAL
Status: DISCONTINUED | OUTPATIENT
Start: 2018-09-19 | End: 2018-09-22 | Stop reason: HOSPADM

## 2018-09-19 RX ORDER — OXYTOCIN/RINGER'S LACTATE 20/1000 ML
125-500 PLASTIC BAG, INJECTION (ML) INTRAVENOUS ONCE
Status: ACTIVE | OUTPATIENT
Start: 2018-09-19 | End: 2018-09-20

## 2018-09-19 RX ORDER — OXYTOCIN 10 [USP'U]/ML
INJECTION, SOLUTION INTRAMUSCULAR; INTRAVENOUS AS NEEDED
Status: DISCONTINUED | OUTPATIENT
Start: 2018-09-19 | End: 2018-09-19 | Stop reason: HOSPADM

## 2018-09-19 RX ORDER — ZOLPIDEM TARTRATE 5 MG/1
5 TABLET ORAL
Status: DISCONTINUED | OUTPATIENT
Start: 2018-09-19 | End: 2018-09-22 | Stop reason: HOSPADM

## 2018-09-19 RX ORDER — DIPHENHYDRAMINE HCL 25 MG
25 CAPSULE ORAL
Status: DISCONTINUED | OUTPATIENT
Start: 2018-09-19 | End: 2018-09-22 | Stop reason: HOSPADM

## 2018-09-19 RX ORDER — SODIUM CHLORIDE 0.9 % (FLUSH) 0.9 %
5-10 SYRINGE (ML) INJECTION EVERY 8 HOURS
Status: DISCONTINUED | OUTPATIENT
Start: 2018-09-19 | End: 2018-09-19 | Stop reason: HOSPADM

## 2018-09-19 RX ORDER — NALOXONE HYDROCHLORIDE 0.4 MG/ML
0.4 INJECTION, SOLUTION INTRAMUSCULAR; INTRAVENOUS; SUBCUTANEOUS AS NEEDED
Status: DISCONTINUED | OUTPATIENT
Start: 2018-09-19 | End: 2018-09-22 | Stop reason: HOSPADM

## 2018-09-19 RX ORDER — FENTANYL CITRATE 50 UG/ML
INJECTION, SOLUTION INTRAMUSCULAR; INTRAVENOUS AS NEEDED
Status: DISCONTINUED | OUTPATIENT
Start: 2018-09-19 | End: 2018-09-19 | Stop reason: HOSPADM

## 2018-09-19 RX ORDER — DIPHENHYDRAMINE HYDROCHLORIDE 50 MG/ML
12.5 INJECTION, SOLUTION INTRAMUSCULAR; INTRAVENOUS
Status: ACTIVE | OUTPATIENT
Start: 2018-09-19 | End: 2018-09-20

## 2018-09-19 RX ORDER — NALOXONE HYDROCHLORIDE 0.4 MG/ML
0.1 INJECTION, SOLUTION INTRAMUSCULAR; INTRAVENOUS; SUBCUTANEOUS
Status: DISCONTINUED | OUTPATIENT
Start: 2018-09-19 | End: 2018-09-22 | Stop reason: HOSPADM

## 2018-09-19 RX ORDER — SODIUM CHLORIDE, SODIUM LACTATE, POTASSIUM CHLORIDE, CALCIUM CHLORIDE 600; 310; 30; 20 MG/100ML; MG/100ML; MG/100ML; MG/100ML
125 INJECTION, SOLUTION INTRAVENOUS CONTINUOUS
Status: DISCONTINUED | OUTPATIENT
Start: 2018-09-19 | End: 2018-09-22 | Stop reason: HOSPADM

## 2018-09-19 RX ADMIN — KETOROLAC TROMETHAMINE 30 MG: 30 INJECTION, SOLUTION INTRAMUSCULAR at 16:10

## 2018-09-19 RX ADMIN — SODIUM CHLORIDE, SODIUM LACTATE, POTASSIUM CHLORIDE, AND CALCIUM CHLORIDE 1000 ML: 600; 310; 30; 20 INJECTION, SOLUTION INTRAVENOUS at 11:23

## 2018-09-19 RX ADMIN — Medication 10 ML: at 08:59

## 2018-09-19 RX ADMIN — SODIUM CHLORIDE, SODIUM LACTATE, POTASSIUM CHLORIDE, AND CALCIUM CHLORIDE 1000 ML: 600; 310; 30; 20 INJECTION, SOLUTION INTRAVENOUS at 08:58

## 2018-09-19 RX ADMIN — FAMOTIDINE 20 MG: 10 INJECTION INTRAVENOUS at 11:28

## 2018-09-19 RX ADMIN — BUPIVACAINE HYDROCHLORIDE 1.6 ML: 7.5 INJECTION, SOLUTION EPIDURAL; RETROBULBAR at 11:30

## 2018-09-19 RX ADMIN — ONDANSETRON 4 MG: 2 INJECTION INTRAMUSCULAR; INTRAVENOUS at 11:26

## 2018-09-19 RX ADMIN — Medication 2 G: at 11:23

## 2018-09-19 RX ADMIN — OXYTOCIN 20 UNITS: 10 INJECTION, SOLUTION INTRAMUSCULAR; INTRAVENOUS at 12:02

## 2018-09-19 RX ADMIN — SODIUM CHLORIDE, SODIUM LACTATE, POTASSIUM CHLORIDE, AND CALCIUM CHLORIDE 1000 ML: 600; 310; 30; 20 INJECTION, SOLUTION INTRAVENOUS at 08:52

## 2018-09-19 RX ADMIN — EPHEDRINE SULFATE 10 MG: 50 INJECTION, SOLUTION INTRAVENOUS at 11:31

## 2018-09-19 RX ADMIN — KETOROLAC TROMETHAMINE 30 MG: 30 INJECTION, SOLUTION INTRAMUSCULAR at 21:33

## 2018-09-19 RX ADMIN — SODIUM CHLORIDE, SODIUM LACTATE, POTASSIUM CHLORIDE, CALCIUM CHLORIDE: 600; 310; 30; 20 INJECTION, SOLUTION INTRAVENOUS at 11:25

## 2018-09-19 RX ADMIN — MORPHINE SULFATE 250 MCG: 0.5 INJECTION, SOLUTION EPIDURAL; INTRATHECAL; INTRAVENOUS at 11:30

## 2018-09-19 RX ADMIN — OXYTOCIN 40 UNITS: 10 INJECTION, SOLUTION INTRAMUSCULAR; INTRAVENOUS at 11:53

## 2018-09-19 RX ADMIN — FENTANYL CITRATE 10 MCG: 50 INJECTION, SOLUTION INTRAMUSCULAR; INTRAVENOUS at 11:30

## 2018-09-19 RX ADMIN — SODIUM CHLORIDE, SODIUM LACTATE, POTASSIUM CHLORIDE, CALCIUM CHLORIDE: 600; 310; 30; 20 INJECTION, SOLUTION INTRAVENOUS at 12:02

## 2018-09-19 NOTE — ANESTHESIA PROCEDURE NOTES
Spinal Block Start time: 9/19/2018 11:29 AM 
End time: 9/19/2018 11:30 AM 
Performed by: Geraldo Mathew Authorized by: Geraldo Mathew Pre-procedure: Indications: at surgeon's request and primary anesthetic  Preanesthetic Checklist: patient identified, risks and benefits discussed, anesthesia consent and timeout performed Spinal Block:  
Patient Position:  Seated Prep Region:  Lumbar Prep: DuraPrep Location:  L3-4 Technique:  Single shot Local:  Lidocaine 1% Local Dose (mL):  2 Needle:  
Needle Type:  Pencan Needle Gauge:  25 G Attempts:  1 Events: CSF confirmed, no blood with aspiration and no paresthesia Assessment: 
Insertion:  Uncomplicated Patient tolerance:  Patient tolerated the procedure well with no immediate complications

## 2018-09-19 NOTE — IP AVS SNAPSHOT
[address] 
[phone] Patient: Kelton Salgado MRN: [id number] GRI:1[birth year]42/6731 A check car indicates which time of day the medication should be taken. My Medications START taking these medications Instructions Each Dose to Equal  
 Morning Noon Evening Bedtime * HYDROcodone-acetaminophen 5-325 mg per tablet Commonly known as:  Lisa Oyster Your last dose was: Your next dose is: Take 1 Tab by mouth every four (4) hours as needed for Pain. Max Daily Amount: 6 Tabs. 1 Tab  
    
   
   
   
  
 * HYDROcodone-acetaminophen 5-325 mg per tablet Commonly known as:  Lisa Oyster Your last dose was: Your next dose is: Take 1-2 Tabs by mouth every four (4) hours as needed. Max Daily Amount: 12 Tabs. 1-2 Tab * ibuprofen 800 mg tablet Commonly known as:  MOTRIN Your last dose was: Your next dose is: Take 1 Tab by mouth every six (6) hours as needed for Pain. 800 mg  
    
   
   
   
  
 * ibuprofen 800 mg tablet Commonly known as:  MOTRIN Your last dose was: Your next dose is: Take 1 Tab by mouth every six (6) hours as needed. 800 mg * Notice: This list has 4 medication(s) that are the same as other medications prescribed for you. Read the directions carefully, and ask your doctor or other care provider to review them with you. CONTINUE taking these medications Instructions Each Dose to Equal  
 Morning Noon Evening Bedtime NexIUM 20 mg capsule Generic drug:  esomeprazole Your last dose was: Your next dose is: Take  by mouth daily. PRENATAL VITAMIN PO Your last dose was: Your next dose is: Take  by mouth. Where to Get Your Medications These medications were sent to [facility] - [address], [address]  [address], [address] [place] [address] Phone:  [phone]  
  ibuprofen 800 mg tablet  
 ibuprofen 800 mg tablet Information on where to get these meds will be given to you by the nurse or doctor. ! Ask your nurse or doctor about these medications HYDROcodone-acetaminophen 5-325 mg per tablet HYDROcodone-acetaminophen 5-325 mg per tablet

## 2018-09-19 NOTE — L&D DELIVERY NOTE
Delivery Summary    Patient: Mily Viramontes MRN: [id number]  SSN: [id number]    YOB: 1990  Age: 33 y.o. Sex: female        Labor Events:    Labor: No    Rupture Date: 2018    Rupture Time: 11:52 AM    Rupture Type: AROM    Amniotic Fluid Volume: Moderate     Amniotic Fluid Description:  Amniotic fluid Odor: Clear    None     Induction: None         Induction Date:        Induction Time:       Indications for Induction:       Augmentation: None    Augmentation Date:      Augmentation Time:      Indications for Augmentation:      Events:        Cervical Ripening:       None    Rupture Identifier: Rupture 1     Labor complications: None     Additional complications:         Delivery Events:  Estimated Blood Loss (ml):        Information for the patient's newbornFabian Tucker [[id number]]     Delivery Summary - Baby    Delivery Date: 2018  Delivery Time: 11:52 AM  Delivery Type: , Low Transverse    Section Delivery:     Sex:  male     Gestational Age: 39w0d  Delivery Clinician:      Living?: Living  Delivery Location: OR           APGARS  One minute Five minutes Ten minutes   Skin color: 1   1        Heart rate: 2   2        Grimace: 2   2        Muscle tone: 2   2        Breathin   2        Totals: 9   9           Presentation: Vertex    Position:        Resuscitation Method:  Suctioning-bulb; Tactile Stimulation     Meconium Stained: None      Cord Information: 3 Vessels   Complications: None  Cord Blood Sent?:  No    Blood Gases Sent?:  No    Placenta:  Date/Time:  11:53 AM  Removal: Manual Removal      Appearance: Normal     [place] Measurements:  Birth Weight: 7 lb 4.1 oz (3.29 kg)      Birth Length: 1' 8\" (0.508 m)      Head Circumference: 1' 1.98\" (0.355 m)      Chest Circumference: 1' 1.78\" (0.35 m)     Abdominal Girth: 1' 0.8\" (0.325 m)    Other Providers:   BECKY HAMILTON;GARRICK MEI;PRABHAKAR GREENE;EDILSON GUERRA;GERRY CARTER;PAT Klaus Castillo Obstetrician;Primary Nurse;Primary  Nurse; Anesthesiologist;Scrub Tech;Scrub Tech;Staff Nurse           Group Beta Strep:   Lab Results   Component Value Date/Time    GrVERITOtrep, External Negative 2018        Cord Blood Results:  Information for the patient's :  Lynne Chavez, Male [[id number]]   No results found for: Sahara Brisk, PCTDIG, BILI, ABORHEXT, ABORH    Information for the patient's newbornAnn Kinsey, Male [[id number]]   No results found for: APH, APCO2, APO2, AHCO3, ABEC, ABDC, O2ST, SITE, [place], PHI, PCO2I, PO2I, HCO3I, SO2I, IBD    Information for the patient's newbornAnn Kinsey, Male [[id number]]   No results found for: EPHV, PCO2V, PO2V, HCO3V, O2STV, EBDV

## 2018-09-19 NOTE — H&P
History & Physical 
 
Name: Martina Angel MRN: [id number]  SSN: [id number] YOB: 1990  Age: 33 y.o. Sex: female Subjective:  
 
Estimated Date of Delivery: 18 OB History  Para Term  AB Living  
 1 SAB TAB Ectopic Molar Multiple Live Births Ms. Hina Nguyen is admitted with pregnancy at 39w0d for  Section. Prenatal course was complicated by gestational thrombocytopenia and breech presentation. Pt declined ECV. Please see prenatal records for details. Past Medical History:  
Diagnosis Date  Depression  Psychiatric problem Past Surgical History:  
Procedure Laterality Date  HX COLPOSCOPY  2016  
 neg path Social History Occupational History  Not on file. Social History Main Topics  Smoking status: Never Smoker  Smokeless tobacco: Never Used  Alcohol use No  
 Drug use: No  
 Sexual activity: Yes  
  Partners: Male Birth control/ protection: None Family History Problem Relation Age of Onset  Hypertension Mother  Lupus Sister  Breast Cancer Maternal Grandmother Breast, kidney and bone CA Allergies Allergen Reactions  Sulfa (Sulfonamide Antibiotics) Rash Prior to Admission medications Medication Sig Start Date End Date Taking? Authorizing Provider  
prenatal vit calc,iron,folic (PRENATAL VITAMIN PO) Take  by mouth. Historical Provider  
esomeprazole (NEXIUM) 20 mg capsule Take  by mouth daily. Historical Provider Review of Systems: A comprehensive review of systems was negative except for that written in the HPI. Objective:  
 
Vitals: 
Vitals:  
 18 9028 18 0856 18 0951 18 1038 BP: 102/69  127/68 122/69 Pulse: 91  93 87 Resp:  14 14 14 Temp:    98.8 °F (37.1 °C) Weight:      
Height:      
  
 
Physical Exam: 
Heart: Regular rate and rhythm Lung: clear to auscultation throughout lung fields, no wheezes, no rales, no rhonchi and normal respiratory effort Abdomen: soft, nontender Fundus: soft and non tender Perineum: blood absent, amniotic fluid absent Cervical Exam: 2/50 %/-3/  
Membranes:  Intact Fetal Heart Rate: Reactive Prenatal Labs:  
Lab Results Component Value Date/Time  
 Rubella, External immune 03/15/2018 GrBStrep, External Negative 2018 HBsAg, External neg 03/15/2018 HIV, External non-reactive 2018 RPR, External neg 03/15/2018 Gonorrhea, External neg 03/15/2018 Chlamydia, External neg 03/15/2018 Assessment/Plan:  
 
Plan: US at bedside now reveals a vtx presentation. Disc options with patient and  who were comfortable and resigned to the fact they were having a CS today. Explained the risks and benefits of elective CS in vtx presentation, induction of labor, waiting for onset of labor at a later date. They consulted with each other and request an elective CS today. Pt meets criteria for delivery with good dating at 43 weeks. They understand the risks involved including bleeding, injury and infection which are increased at CS over . Disc the impact of CS on future deliveries and risk of TOLAC if she chooses. They are adamant they want to proceed with CS. Group B Strep was negative. Signed By:  Prakash Turcios MD   
 2018

## 2018-09-19 NOTE — PROGRESS NOTES
Bedside and Verbal shift change report given to Meme Cantu (oncoming nurse) by Shandra Wade. Chacho Benton (offgoing nurse). Report given with SBAR, Kardex, Intake/Output and MAR.

## 2018-09-19 NOTE — ANESTHESIA PREPROCEDURE EVALUATION
Anesthetic History No history of anesthetic complications Review of Systems / Medical History Patient summary reviewed, nursing notes reviewed and pertinent labs reviewed Pulmonary Within defined limits Neuro/Psych Within defined limits Cardiovascular Within defined limits Exercise tolerance: >4 METS Comments: Not on beta blocker GI/Hepatic/Renal 
Within defined limits Endo/Other Within defined limits Other Findings Physical Exam 
 
Airway Mallampati: II 
TM Distance: 4 - 6 cm Neck ROM: normal range of motion Mouth opening: Normal 
 
 Cardiovascular Regular rate and rhythm,  S1 and S2 normal,  no murmur, click, rub, or gallop Rhythm: regular Rate: normal 
 
 
 
 Dental 
No notable dental hx Pulmonary Breath sounds clear to auscultation Abdominal 
GI exam deferred Other Findings Anesthetic Plan ASA: 2 Anesthesia type: spinal 
 
 
Post-op pain plan if not by surgeon: intrathecal opiates Anesthetic plan and risks discussed with: Patient

## 2018-09-19 NOTE — LACTATION NOTE
Candice Sanchez Lactation Consultant Signed  Progress Notes Date of Service: 09/19/18 9436 Problem: Lactation Care Plan Goal: *Infant latching appropriately Outcome: Progressing Towards Goal 
Pt will successfully establish breastfeeding by feeding in response to infant's early feeding cues and/or to offer breast every 2-3 hours. Ways to obtain a deep latch and seek comfortable positioning shared, aware to keep log of feedings/output. Goal: *Weight loss less than 10% of birth weight Outcome: Progressing Towards Goal 
  
Encouraged mom to attempt feeding with baby led feeding cues. Just as sucking on fingers, rooting, mouthing. Looking for 8-12 feedings in 24 hours. Don't limit baby at breast, allow baby to come of breast on it's own. Baby may want to feed  often and may increase number of feedings on second day of life. Skin to skin encouraged.  
  
 If baby doesn't nurse,  Mom should  hand express  10-20 drops of colostrum and drip into baby's mouth, or give to baby by finger feeding, cup feeding, or spoon feeding at least every 2-3 hours.  
  
Problem: Patient Education: Go to Patient Education Activity Goal: Patient/Family Education Outcome: Progressing Towards Goal 
Discussed with mother her plan for feeding. Reviewed the benefits of exclusive breast milk feeding during the hospital stay. Informed her of the risks of using formula to supplement in the first few days of life as well as the benefits of successful breast milk feeding; referred her to the Breastfeeding booklet about this information. She acknowledges understanding of information reviewed and states that it is her plan to breastfeed her infant. Will support her choice and offer additional information as needed.  
  
Hand Expression Education:  Mom taught how to manually hand express her colostrum.   Emphasized the importance of providing infant with valuable colostrum as infant rests skin to skin at breast.  Aware to avoid extended periods of non-feeding. Aware to offer 10-20+ drops of colostrum every 2-3 hours until infant is latching and nursing effectively. Taught the rationale behind this low tech but highly effective evidence based practice. 
  
Comments: Pt will successfully establish breastfeeding by feeding in response to early feeding cues  
or wake every 3h, will obtain deep latch, and will keep log of feedings/output. Taught to BF at hunger cues and or q 2-3 hrs and to offer 10-20 drops of hand expressed colostrum at any non-feeds.   
  
Breast Assessment Left Breast: Large Left Nipple: Everted, Intact, Short Right Breast: Large Right Nipple: Everted, Intact, Short Breast- Feeding Assessment Attends Breast-Feeding Classes: Yes Breast-Feeding Experience: No 
Breast Trauma/Surgery: No 
Type/Quality: Good Lactation Consultant Visits Breast-Feedings: Good  (Baby was rooting and latched on well to left breast. ) Mother/Infant Observation Mother Observation: Alignment, Close hold, Breast comfortable, Holds breast, Nipple round on release, Recognizes feeding cues Infant Observation: Audible swallows, Feeding cues, Latches nipple and aereolae, Lips flanged, lower, Lips flanged, upper, Opens mouth, Relaxed after feeding, Rhythmic suck LATCH Documentation Latch: Grasps breast, tongue down, lips flanged, rhythmic sucking Audible Swallowing: A few with stimulation Type of Nipple: Everted (after stimulation) Comfort (Breast/Nipple): Soft/non-tender Hold (Positioning): Full assist, teach one side, mother does other, staff holds LATCH Score: 8

## 2018-09-19 NOTE — PROGRESS NOTES
09/19/18 10:52 AM 
CM met with patient and her /FOB John Hilton ([phone]) to complete initial assessment and to begin discharge planning. Demographics were reviewed and confirmed. This is the first baby for the couple. They are both originally from [place], moved to [place], and have now come back to [place] to be in [place]. They have a great support system of family and friends to assist as needed during the postpartum period. 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 FOB works and will return to work on October 1st.  Patient plans to breastfeed and has a pump to use at home. [facility] baby. Patient has car seat, crib, clothing, and other necessary supplies. Denied need for [facility] and Medicaid services. PCP list provided to patient to establish care with a regular physician. Care Management Interventions PCP Verified by CM: No (BSI list provided) Mode of Transport at Discharge: Self Transition of Care Consult (CM Consult): Discharge Planning Current Support Network: Lives with Spouse, Family Lives [place] Confirm Follow Up Transport: Family Plan discussed with Pt/Family/Caregiver: Yes Discharge Location Discharge Placement: Home with family assistance SHARMILA Medina

## 2018-09-19 NOTE — PROGRESS NOTES
0732 - Pt arrived on L&D at this time for scheduled c/s, for breech presentation. Pt. Voided in bathroom scant amount clear yellow urine at this time. Removed jewelry. Fall risk socks and band applied. Allergy band applied. 7026 - VS and admission assessment done. 2343 - IV placed and labs drawn/sent to lab for processing. 0815 - 3 min abdominal skin wash done and pubic hair trimmed with electic clipper at this time. Pt resting comfortably in bed, spouse at bedside. 8732 - Lab draw for second TS done and sent to blood bank at this time, by Nabeel Madrigal RN.  
0132 - RN at bedside. Pt resting comfortably in bed at this time. Denies further needs at this time. 1951 - Dr. Mohsen Lopez in to see pt. Ultrasound done at this time - baby is head down. Lab results including platelets reviewed. Dr. Mohsen Lopez now discussing options with pt regarding elective c/s, elective induction, or pt going home to wait for labor. Charge RN, anesthesia, and scrub techs notified. 1927 - SVE by Dr. Mohsen Lopez at this time (2/50/-3). Pt and spouse then left alone to discuss options and will notify RN when they would like to see nursing staff/MD for decision made. MD did state to patient, \"I do not recommend a c/s today. \" 
[phone] - Pt states she would like to do a c/s today anyway. MD aware. MD aware pt. Only 39 weeks but would like to go ahead with elective c/s, per pt preference. Anesthesia, Charge RN, and scrub techs updated at this time. 0944 to 0947 - Pt oob to bathroom without issue. Voided clear yellow urine. Pt and spouse updated that not able to go back for c/s yet as her case may be bumped for another patient - will update pt as able. Pt states understanding. 4492 - RN at bedside. Pt resting comfortably in bed, denies pain and denies further needs. 1000 - Pt updated on delay for c/s due to another pt.  
1038 - Rounded on pt at this time.  Pt resting comfortably in bed with family at bedside. Denies pain. Denies further needs at this time. 1048 to 1050 - Pt oob to bathroom without issue. Voided clear yellow urine. Denies further needs at this time. 1123 - Ancef 2 GM given per orders via IV at this time. 1124 - Pt off monitor at this time to ambulate to OR.  
1126 - Pt in OR. See OR record. 1221 - Left OR. 
1225 - Back in pt room; IVF placed back on pump (continuing LR with pitocin, started by anesth.) at 125/hr. VS taken. Pulse ox applied. Pt resting comfortably in room with spouse at bedside. Recovery started. 1235 - Skin to skin intiated with mother at this time. 18 - Breastfeeding instruction given and started at this time. 1300 - RN remains at bedside with pt. Pt denies pain and denies further needs at this time. 1410 - Rounded on pt. Lactation consultant at bedside. Will plan to transfer pt to postpartum unit shortly. Pads changed/cele care done. 1440 - Transferred pt to MIU room 306 at this time, via stretcher. Infant transferred via bassinet. Pt denies pain or further needs. Bourne catheter emptied and cele pads changed. 1445 - Report given to Flynn Barrett RN on MIU at bedside. TRANSFER - OUT REPORT: 
 
Verbal report given to Flynn Barrett RN (name) on Humera Medina  being transferred to MIU(unit) for routine progression of care Report consisted of patients Situation, Background, Assessment and  
Recommendations(SBAR). Information from the following report(s) SBAR, Kardex, OR Summary, Procedure Summary, Intake/Output, MAR, Recent Results and Med Rec Status was reviewed with the receiving nurse. Lines:  
Peripheral IV 09/19/18 Right Hand (Active) Site Assessment Clean, dry, & intact 9/19/2018 12:26 PM  
Phlebitis Assessment 0 9/19/2018 12:26 PM  
Infiltration Assessment 0 9/19/2018 12:26 PM  
Dressing Status Clean, dry, & intact 9/19/2018 12:26 PM  
Dressing Type Transparent 9/19/2018 12:26 PM  
Hub Color/Line Status Pink 9/19/2018 12:26 PM  
 Alcohol Cap Used Yes 9/19/2018 12:26 PM  
  
 
Opportunity for questions and clarification was provided. Patient transported with: 
 Registered Nurse. Relinquished care of pt.

## 2018-09-19 NOTE — IP AVS SNAPSHOT
Debbie Merida 
 
 
 [address] [facility] [address] [facility] 
[phone] Patient: Francetta Cowden MRN: [id number] SEX:8/88/9621 About your hospitalization You were admitted on:  September 19, 2018 [facility] in the:  [facility] [address] You were discharged on:  September 22, 2018 Why you were hospitalized Your primary diagnosis was:  Not on File Your diagnoses also included:  Pregnancy Follow-up Information Follow up With Details Comments Contact Info None   None ([phone]) Patient stated that they have no PCP Bhupinder Willingham MD In 6 weeks  [address] [address] [address] [facility] 
[phone] Discharge Orders None A check car indicates which time of day the medication should be taken. My Medications START taking these medications Instructions Each Dose to Equal  
 Morning Noon Evening Bedtime * HYDROcodone-acetaminophen 5-325 mg per tablet Commonly known as:  Padilla Minor Your last dose was: Your next dose is: Take 1 Tab by mouth every four (4) hours as needed for Pain. Max Daily Amount: 6 Tabs. 1 Tab  
    
   
   
   
  
 * HYDROcodone-acetaminophen 5-325 mg per tablet Commonly known as:  Padilla Minor Your last dose was: Your next dose is: Take 1-2 Tabs by mouth every four (4) hours as needed. Max Daily Amount: 12 Tabs. 1-2 Tab * ibuprofen 800 mg tablet Commonly known as:  MOTRIN Your last dose was: Your next dose is: Take 1 Tab by mouth every six (6) hours as needed for Pain. 800 mg  
    
   
   
   
  
 * ibuprofen 800 mg tablet Commonly known as:  MOTRIN Your last dose was: Your next dose is: Take 1 Tab by mouth every six (6) hours as needed. 800 mg * Notice:   This list has 4 medication(s) that are the same as other medications prescribed for you. Read the directions carefully, and ask your doctor or other care provider to review them with you. CONTINUE taking these medications Instructions Each Dose to Equal  
 Morning Noon Evening Bedtime NexIUM 20 mg capsule Generic drug:  esomeprazole Your last dose was: Your next dose is: Take  by mouth daily. PRENATAL VITAMIN PO Your last dose was: Your next dose is: Take  by mouth. Where to Get Your Medications These [facility] to [facility] [address], [address] [facility] [address]  [address], [facility] Phone:  [phone]  
  ibuprofen 800 mg tablet  
 ibuprofen 800 mg tablet Information on where to get these meds will be given to you by the nurse or doctor. ! Ask your nurse or doctor about these medications HYDROcodone-acetaminophen 5-325 mg per tablet HYDROcodone-acetaminophen 5-325 mg per tablet Opioid Education Prescription Opioids: What You Need to Know: 
 
 
Diet/Diet Restrictions: 
Eight 8-ounce glasses of fluid daily (water, juices); avoid excessive caffeine intake. Meals/snacks as desired which are high in fiber and carbohydrates and low in fat and cholesterol. Medications:  
 
 
 
Physical Activity / Restrictions / Safety: Avoid heavy lifting, no more that 8 lbs. For 2-3 weeks; No driving while taking narcotic pain medication. Post  patients should not drive until pain free. No intercourse 4-6 weeks, no douching or tampon use. May resume exercise in 6 weeks. Discharge Instructions/Special Treatment/Home Care Needs:  
 
Continue prenatal vitamins. Continue to use squirt bottle with warm water on your episiotomy after each bathroom use until bleeding stops. If steri-strips applied to your incision, remove in 7 days. Take stool softeners daily. Call your doctor for the following:  
 
Fever over 101 degrees by mouth. Vaginal bleeding heavier than a normal menstrual period or lost larger than a golf ball. Red streaks or increased swelling of legs, painful red streaks on your breast. 
Painful urination, or increased pain, redness or discharge with your incision. Pain Management:  
 
Pain Management:  
Take Acetaminophen (Tylenol) or Ibuprofen (Advil, Motrin), as directed for pain. Use a warm Sitz bath 3 times daily to relieve episiotomy or hemorrhoidal discomfort. Heating pad to  incision as needed. For hemorrhoidal discomfort, use Tucks and Anusol cream as needed and directed. Follow-Up Care:  
 
Appointment with MD: Follow-up Appointments Procedures  FOLLOW UP VISIT Appointment in: 6 Weeks Standing Status:   Standing Number of Occurrences:   1 Order Specific Question:   Appointment in Answer:   6 Weeks Telephone number: [phone] Signed By: Joy Trinh MD                                                                                                   Date: 2018 Time: 4:38 PM 
 
 
  
  
  
Moasis Announcement We are excited to announce that we are making your provider's discharge notes available to you in Moasis.   You will see these notes when they are completed and signed by the physician that discharged you from your recent hospital stay. If you have any questions or concerns about any information you see in Smallable, please call the Health Information Department where you were seen or reach out to your Primary Care Provider for more information about your plan of care. Introducing [facility]! Dear Edson Kern: 
Thank you for requesting a Smallable account. Our records indicate that you already have an active Smallable account. You can access your account anytime at [email or web address] Did you know that you can access your hospital and ER discharge instructions at any time in Smallable? You can also review all of your test results from your hospital stay or ER visit. Additional Information If you have questions, please visit the Frequently Asked Questions section of the Smallable website at [email or web address] Remember, Smallable is NOT to be used for urgent needs. For medical emergencies, dial 911. Now available from your iPhone and Android! Introducing Tariq Romero As a [facility] patient, I wanted to make you aware of our electronic visit tool called Tariq Marceloherminiachristine. J[facility]ony [facility] 24/7 allows you to connect within minutes with a medical provider 24 hours a day, seven days a week via a mobile device or tablet or logging into a secure website from your computer. You can access Tariq Romero from anywhere in the [place]. A virtual visit might be right for you when you have a simple condition and feel like you just dont want to get out of bed, or cant get away from work for an appointment, when your regular [facility] Carbo provider is not available (evenings, weekends or holidays), or when youre out of town and need minor care. Electronic visits cost only $49 and if the [facility] [place] 24/7 provider determines a prescription is needed to treat your condition, one can be electronically transmitted to a nearby pharmacy*. Please take a moment to enroll today if you have not already done so. The enrollment process is free and takes just a few minutes. To enroll, please download the [employer] 24/7 mariam to your tablet or phone, or visit [email or web address] to enroll on your computer. And, as an [address] [place] [address] patient with a [employer] account, the results of your visits will be scanned into your electronic medical record and your primary care provider will be able to view the scanned results. We urge you to continue to see your regular [employer] provider for your ongoing medical care. And while your primary care provider may not be the one available when you seek a Discoverly virtual visit, the peace of mind you get from getting a real diagnosis real time can be priceless. For more information on Discoverly, view our Frequently Asked Questions (FAQs) at [email or web address]. Sincerely, 
 
Naya Dhaliwal MD 
Chief Medical Officer [address] [place] [address] *:  certain medications cannot be prescribed via Discoverly Unresulted Labs-Please follow up with your PCP about these lab tests Order Current Status CBC W/O DIFF In process Providers Seen During Your Hospitalization Provider Specialty Primary office phone Sintia Lerma MD Obstetrics & Gynecology [phone] Your Primary Care Physician (PCP) Primary Care Physician Office Phone Office Fax NONE ** None ** ** None ** You are allergic to the following Allergen Reactions Sulfa (Sulfonamide Antibiotics) Rash Recent Documentation Height Weight Breastfeeding? BMI OB Status Smoking Status 1.549 m 86.2 kg Unknown 35.9 kg/m2 Recent pregnancy Never Smoker Emergency Contacts Name Discharge Info Relation Home Work Mobile [facility] DISCHARGE CAREGIVER [3] Spouse [3] [phone] Patient Belongings The following personal items are in your possession at time of discharge: 
  Dental Appliances: None  Visual Aid: Contacts, Glasses, With patient      Home Medications: None   Jewelry: With patient  Clothing: At bedside, With patient    Other Valuables: At bedside, With patient Please provide this summary of care documentation to your next provider. Signatures-by signing, you are acknowledging that this After Visit Summary has been reviewed with you and you have received a copy. Patient Signature:  ____________________________________________________________ Date:  ____________________________________________________________  
  
[facility] Provider Signature:  ____________________________________________________________ Date:  ____________________________________________________________

## 2018-09-19 NOTE — ANESTHESIA POSTPROCEDURE EVALUATION
Post-Anesthesia Evaluation and Assessment Patient: Cam Patricio MRN: [id number]  SSN: [id number] YOB: 1990  Age: 33 y.o. Sex: female Cardiovascular Function/Vital Signs Visit Vitals  /58  Pulse 87  Temp 36.9 °C (98.5 °F)  Resp 14  
 Ht 5' 1\" (1.549 m)  Wt 86.2 kg (190 lb)  SpO2 99%  Breastfeeding Unknown  BMI 35.9 kg/m2 Patient is status post spinal anesthesia for Procedure(s):  SECTION. Nausea/Vomiting: None Postoperative hydration reviewed and adequate. Pain: 
Pain Scale 1: Numeric (0 - 10) (18 131) Pain Intensity 1: 0 (18 131) Managed Neurological Status:  
Neuro (WDL): Exceptions to WDL (18 1226) Neuro Neurologic State: Pharmacologically paralyzed (18 1226) Orientation Level: Oriented X4 (18 122) LLE Motor Response: Numbness; Pharmacologically paralyzed;Weak (18 1226) RLE Motor Response: Pharmacologically paralyzed;Numbness;Weak (18 1226) At baseline Mental Status and Level of Consciousness: Arousable Pulmonary Status:  
O2 Device: Room air (18 1311) Adequate oxygenation and airway patent Complications related to anesthesia: None Post-anesthesia assessment completed. No concerns Signed By: Maryellen Pike MD   
 2018

## 2018-09-19 NOTE — OP NOTES
Operative Note    Name: Carlos Santos   Medical Record Number: [id number]   YOB: 1990  Today's Date: 2018      Pre-operative Diagnosis: Unstable lie, now vtx desires elective primary CS    Post-operative Diagnosis: TLBMI    Operation: low transverse  section Procedure(s):   SECTION    Surgeon(s):  Shaheen Keith MD  Assitant: Nicole Lawrence  Anesthesia: Spinal    Prophylactic Antibiotics: Ancef  DVT Prophylaxis: Sequential Compression Devices  EBL: 750cc       Fetal Description: johnson     Birth Information:   Information for the patient's :  Saima Booker, Male [[id number]]   Delivery of a 7 lb 4.1 oz (3.29 kg) Male [2] infant on 2018 at 11:52 AM. Apgars were 9 and 9. Umbilical Cord:     Umbilical Cord Events:     Placenta:  removal with  appearance. Amniotic Fluid Volume: Moderate     Amniotic Fluid Description:  Clear        Umbilical Cord: 3 vessels present    Placenta:  manual removal    Specimens: none           Complications:  none    Procedure Detail:      After proper patient identification and consent, the patient was taken to the operating room, where spinal anesthesia was administered and found to be adequate. Bourne catheter had been placed using sterile technique. The patient was prepped and draped in the normal sterile fashion. The abdomen was entered using the Pfannenstiel technique. The peritoneum was entered bluntely well superior to the bladder without any apparent injury. An Jovani retractor was placed. Palpation revealed no bowel below the retractor. The bladder flap was created without difficulty. A low transverse uterine incision was made with the scalpel and extended with blunt finger dissection. Amniotomy was performed and the fluid was medium amount clear. The babys head was then delivered atraumatically. The nose and mouth were suctioned.  The cord was clamped and cut and the baby was handed off to Nursing staff in attendance. Placenta was manually extracted. The uterus was wiped clean with a moist lap pad and cleared of all clots and debris. The uterine incision was closed in 2 layers, first with a running locked suture of 0-Vicryl, then with an imbricated layer. Adequate hemostasis was noted. Both tubes and ovaries appeared normal. The pericolic gutters were then lavaged clean with normal saline. Good hemostasis was again reassured The Jovani retractor was removed. The fascia was closed with 1-PDS in a running fashion. Good hemostasis was assured. The incision was lavaged clean and small bleeders were coagulated with the bovie. The skin was closed with absorbable staples. The patient tolerated the procedure well. Sponge, lap, and needle counts were correct times three and the patient and baby were taken to recovery/postpartum room in stable condition.     Neda Yuen MD  September 19, 2018  1:47 PM

## 2018-09-20 LAB
BASOPHILS # BLD: 0 K/UL (ref 0–0.1)
BASOPHILS NFR BLD: 0 % (ref 0–1)
DIFFERENTIAL METHOD BLD: ABNORMAL
EOSINOPHIL # BLD: 0 K/UL (ref 0–0.4)
EOSINOPHIL NFR BLD: 0 % (ref 0–7)
ERYTHROCYTE [DISTWIDTH] IN BLOOD BY AUTOMATED COUNT: 13.6 % (ref 11.5–14.5)
HCT VFR BLD AUTO: 25.7 % (ref 35–47)
HGB BLD-MCNC: 8.3 G/DL (ref 11.5–16)
IMM GRANULOCYTES # BLD: 0.1 K/UL (ref 0–0.04)
IMM GRANULOCYTES NFR BLD AUTO: 1 % (ref 0–0.5)
LYMPHOCYTES # BLD: 1.8 K/UL (ref 0.8–3.5)
LYMPHOCYTES NFR BLD: 18 % (ref 12–49)
MCH RBC QN AUTO: 30.5 PG (ref 26–34)
MCHC RBC AUTO-ENTMCNC: 32.3 G/DL (ref 30–36.5)
MCV RBC AUTO: 94.5 FL (ref 80–99)
MONOCYTES # BLD: 0.7 K/UL (ref 0–1)
MONOCYTES NFR BLD: 7 % (ref 5–13)
NEUTS SEG # BLD: 7.1 K/UL (ref 1.8–8)
NEUTS SEG NFR BLD: 74 % (ref 32–75)
NRBC # BLD: 0 K/UL (ref 0–0.01)
NRBC BLD-RTO: 0 PER 100 WBC
PLATELET # BLD AUTO: 88 K/UL (ref 150–400)
PMV BLD AUTO: 11.6 FL (ref 8.9–12.9)
RBC # BLD AUTO: 2.72 M/UL (ref 3.8–5.2)
WBC # BLD AUTO: 9.7 K/UL (ref 3.6–11)

## 2018-09-20 PROCEDURE — 74011250637 HC RX REV CODE- 250/637: Performed by: OBSTETRICS & GYNECOLOGY

## 2018-09-20 PROCEDURE — 74011250637 HC RX REV CODE- 250/637: Performed by: STUDENT IN AN ORGANIZED HEALTH CARE EDUCATION/TRAINING PROGRAM

## 2018-09-20 PROCEDURE — 36415 COLL VENOUS BLD VENIPUNCTURE: CPT | Performed by: OBSTETRICS & GYNECOLOGY

## 2018-09-20 PROCEDURE — 85025 COMPLETE CBC W/AUTO DIFF WBC: CPT | Performed by: OBSTETRICS & GYNECOLOGY

## 2018-09-20 PROCEDURE — 65270000029 HC RM PRIVATE

## 2018-09-20 RX ADMIN — HYDROCODONE BITARTRATE AND ACETAMINOPHEN 2 TABLET: 5; 325 TABLET ORAL at 15:10

## 2018-09-20 RX ADMIN — DOCUSATE SODIUM 100 MG: 100 CAPSULE, LIQUID FILLED ORAL at 17:14

## 2018-09-20 RX ADMIN — MUPIROCIN: 20 OINTMENT TOPICAL at 01:11

## 2018-09-20 RX ADMIN — IBUPROFEN 800 MG: 800 TABLET ORAL at 09:50

## 2018-09-20 RX ADMIN — HYDROCODONE BITARTRATE AND ACETAMINOPHEN 2 TABLET: 5; 325 TABLET ORAL at 06:29

## 2018-09-20 RX ADMIN — HYDROCODONE BITARTRATE AND ACETAMINOPHEN 1 TABLET: 5; 325 TABLET ORAL at 09:50

## 2018-09-20 RX ADMIN — IBUPROFEN 800 MG: 800 TABLET ORAL at 02:29

## 2018-09-20 RX ADMIN — SIMETHICONE CHEW TAB 80 MG 80 MG: 80 TABLET ORAL at 17:14

## 2018-09-20 RX ADMIN — DOCUSATE SODIUM 100 MG: 100 CAPSULE, LIQUID FILLED ORAL at 09:50

## 2018-09-20 RX ADMIN — IBUPROFEN 800 MG: 800 TABLET ORAL at 17:14

## 2018-09-20 RX ADMIN — HYDROCODONE BITARTRATE AND ACETAMINOPHEN 2 TABLET: 5; 325 TABLET ORAL at 19:13

## 2018-09-20 RX ADMIN — HYDROCODONE BITARTRATE AND ACETAMINOPHEN 1 TABLET: 5; 325 TABLET ORAL at 11:47

## 2018-09-20 RX ADMIN — HYDROCODONE BITARTRATE AND ACETAMINOPHEN 1 TABLET: 5; 325 TABLET ORAL at 01:45

## 2018-09-20 NOTE — PROGRESS NOTES
Bedside and Verbal shift change report given to Lisa Sadler (oncoming nurse) by Miguel A Sanderson. Sumit Jiménez (offgoing nurse). Report given with SBAR, Kardex, Intake/Output and MAR.

## 2018-09-20 NOTE — ROUTINE PROCESS
Bedside and Verbal shift change report given to Lindsay Elias RN (oncoming nurse) by Tereza Jenkins RN (offgoing nurse). Report given with SBAR, Kardex, Intake/Output and MAR.

## 2018-09-20 NOTE — LACTATION NOTE
Nehemias Needs Lactation Consultant Signed  Progress Notes Date of Service: 18 1230 Problem: Lactation Care Plan Goal: *Infant latching appropriately Outcome: Progressing Towards Goal 
Pt will successfully establish breastfeeding by feeding in response to infant's early feeding cues and/or to offer breast every 2-3 hours. Ways to obtain a deep latch and seek comfortable positioning shared, aware to keep log of feedings/output. Goal: *Weight loss less than 10% of birth weight Outcome: Progressing Towards Goal 
Reviewed breastfeeding basics:  Supply and demand, breastfeed baby 8-12 times in 24 hr.,  stomach size, early  Feeding cues, skin to skin, positioning and baby led latch-on, assymetrical latch with signs of good, deep latch vs shallow, feeding frequency and duration, and log sheet for tracking infant feedings and output. Breastfeeding Booklet and Warm line information given. Discussed typical  weight loss and the importance of infant weight checks with pediatrician 1-2 post discharge. 
  
Problem: Patient Education: Go to Patient Education Activity Goal: Patient/Family Education Outcome: Progressing Towards Goal 
Discussed what to do if nipples become sore. Care for sore/tender nipples discussed:  ways to improve positioning and latch practiced and discussed, hand express colostrum after feedings and let air dry, light application of lanolin, hydrogel pads, seek comfortable laid back feeding position, start feedings on least sore side first. 
  
Comments: Pt will successfully establish breastfeeding by feeding in response to early feeding cues  
or wake every 3h, will obtain deep latch, and will keep log of feedings/output. Taught to BF at hunger cues and or q 2-3 hrs and to offer 10-20 drops of hand expressed colostrum at any non-feeds.   
  
Breast Assessment Left Breast: Large Left Nipple: Everted, Intact, Short Right Breast: Large Right Nipple: Everted, Intact, Short Breast- Feeding Assessment Attends Breast-Feeding Classes: Yes Breast-Feeding Experience: No 
Breast Trauma/Surgery: No 
Type/Quality: Good Lactation Consultant Visits Breast-Feedings: Good  (Baby was latched on well to left breast. He was awake and alert and breastfeeding vigorously.) Mother/Infant Observation Mother Observation: Alignment, Close hold, Recognizes feeding cues, Holds breast 
Infant Observation: Audible swallows, Feeding cues, Latches nipple and aereolae, Lips flanged, upper, Lips flanged, lower, Opens mouth, Rhythmic suck LATCH Documentation Latch: Grasps breast, tongue down, lips flanged, rhythmic sucking Audible Swallowing: A few with stimulation Type of Nipple: Everted (after stimulation) (short nipples) Comfort (Breast/Nipple): Soft/non-tender Hold (Positioning): No assist from staff, mother able to position/hold infant LATCH Score: 9

## 2018-09-20 NOTE — PROGRESS NOTES
Post-Operative Day Number 1 Progress Note Patient doing well post-op day 1 from  delivery without significant complaints. Pain controlled on current medication. Voiding without difficulty, normal lochia. Vitals:  Patient Vitals for the past 8 hrs: 
 BP Temp Pulse Resp  
18 1626 119/66 98.1 °F (36.7 °C) 83 15 Temp (24hrs), Av.5 °F (36.9 °C), Min:98.1 °F (36.7 °C), Max:99.8 °F (37.7 °C) Vital signs stable, afebrile. Exam:  Patient without distress. Abdomen soft, fundus firm at level of umbilicus, nontender. Incision dry and clean without erythema. Lower extremities are negative for swelling, cords or tenderness. Labs:  
Recent Results (from the past 24 hour(s)) CBC WITH AUTOMATED DIFF Collection Time: 18  1:41 AM  
Result Value Ref Range WBC 9.7 3.6 - 11.0 K/uL  
 RBC 2.72 (L) 3.80 - 5.20 M/uL HGB 8.3 (L) 11.5 - 16.0 g/dL HCT 25.7 (L) 35.0 - 47.0 % MCV 94.5 80.0 - 99.0 FL  
 MCH 30.5 26.0 - 34.0 PG  
 MCHC 32.3 30.0 - 36.5 g/dL  
 RDW 13.6 11.5 - 14.5 % PLATELET 88 (L) 968 - 400 K/uL MPV 11.6 8.9 - 12.9 FL  
 NRBC 0.0 0  WBC ABSOLUTE NRBC 0.00 0.00 - 0.01 K/uL NEUTROPHILS 74 32 - 75 % LYMPHOCYTES 18 12 - 49 % MONOCYTES 7 5 - 13 % EOSINOPHILS 0 0 - 7 % BASOPHILS 0 0 - 1 % IMMATURE GRANULOCYTES 1 (H) 0.0 - 0.5 % ABS. NEUTROPHILS 7.1 1.8 - 8.0 K/UL  
 ABS. LYMPHOCYTES 1.8 0.8 - 3.5 K/UL  
 ABS. MONOCYTES 0.7 0.0 - 1.0 K/UL  
 ABS. EOSINOPHILS 0.0 0.0 - 0.4 K/UL  
 ABS. BASOPHILS 0.0 0.0 - 0.1 K/UL  
 ABS. IMM. GRANS. 0.1 (H) 0.00 - 0.04 K/UL  
 DF AUTOMATED Assessment and Plan:  Patient appears to be having uncomplicated post- course. Continue routine post-op care and maternal education. Gestational thrombocytopenia - recheck CBC tomorrow

## 2018-09-20 NOTE — PROGRESS NOTES
Bedside shift change report given to [facility]  (oncoming nurse) by Navneet RICHARDSON (offgoing nurse). Report included the following information SBAR, Kardex, Intake/Output, MAR and Recent Results.

## 2018-09-21 PROCEDURE — 74011250637 HC RX REV CODE- 250/637: Performed by: STUDENT IN AN ORGANIZED HEALTH CARE EDUCATION/TRAINING PROGRAM

## 2018-09-21 PROCEDURE — 65270000029 HC RM PRIVATE

## 2018-09-21 RX ORDER — IBUPROFEN 800 MG/1
800 TABLET ORAL
Qty: 60 TAB | Refills: 0 | Status: SHIPPED | OUTPATIENT
Start: 2018-09-21 | End: 2019-06-17

## 2018-09-21 RX ORDER — HYDROCODONE BITARTRATE AND ACETAMINOPHEN 5; 325 MG/1; MG/1
1 TABLET ORAL
Qty: 20 TAB | Refills: 0 | Status: SHIPPED | OUTPATIENT
Start: 2018-09-21 | End: 2019-06-17

## 2018-09-21 RX ADMIN — HYDROCODONE BITARTRATE AND ACETAMINOPHEN 2 TABLET: 5; 325 TABLET ORAL at 09:03

## 2018-09-21 RX ADMIN — HYDROCODONE BITARTRATE AND ACETAMINOPHEN 2 TABLET: 5; 325 TABLET ORAL at 17:33

## 2018-09-21 RX ADMIN — IBUPROFEN 800 MG: 800 TABLET ORAL at 09:04

## 2018-09-21 RX ADMIN — HYDROCODONE BITARTRATE AND ACETAMINOPHEN 2 TABLET: 5; 325 TABLET ORAL at 05:37

## 2018-09-21 RX ADMIN — DOCUSATE SODIUM 100 MG: 100 CAPSULE, LIQUID FILLED ORAL at 09:03

## 2018-09-21 RX ADMIN — DOCUSATE SODIUM 100 MG: 100 CAPSULE, LIQUID FILLED ORAL at 17:33

## 2018-09-21 RX ADMIN — IBUPROFEN 800 MG: 800 TABLET ORAL at 17:32

## 2018-09-21 RX ADMIN — HYDROCODONE BITARTRATE AND ACETAMINOPHEN 2 TABLET: 5; 325 TABLET ORAL at 00:56

## 2018-09-21 RX ADMIN — HYDROCODONE BITARTRATE AND ACETAMINOPHEN 2 TABLET: 5; 325 TABLET ORAL at 21:05

## 2018-09-21 RX ADMIN — HYDROCODONE BITARTRATE AND ACETAMINOPHEN 2 TABLET: 5; 325 TABLET ORAL at 13:46

## 2018-09-21 RX ADMIN — IBUPROFEN 800 MG: 800 TABLET ORAL at 00:57

## 2018-09-21 NOTE — ROUTINE PROCESS
Bedside and Verbal shift change report given to Viviana Landeros RN (oncoming nurse) by Cecilia Kimbrough RN (offgoing nurse). Report given with SBAR, Kardex, Intake/Output and MAR.

## 2018-09-21 NOTE — PROGRESS NOTES
Bedside and Verbal shift change report given to Diya Cruz RN (oncoming nurse) by Uche Lozano RN (offgoing nurse). Report included the following information SBAR, Kardex, Procedure Summary, Intake/Output, MAR and Recent Results.

## 2018-09-21 NOTE — LACTATION NOTE
Debi Schwab Lactation Consultant Signed  Progress Notes Date of Service: 18 0311 Problem: Lactation Care Plan Goal: *Infant latching appropriately Outcome: Progressing Towards Goal 
Pt will successfully establish breastfeeding by feeding in response to infant's early feeding cues and/or to offer breast every 2-3 hours. Ways to obtain a deep latch and seek comfortable positioning shared, aware to keep log of feedings/output. Goal: *Weight loss less than 10% of birth weight Outcome: Progressing Towards Goal 
Reviewed breastfeeding basics:  Supply and demand,breastfeed baby 8-12 times in 24 hr.,   stomach size, early  Feeding cues, skin to skin, positioning and baby led latch-on, assymetrical latch with signs of good, deep latch vs shallow, feeding frequency and duration, and log sheet for tracking infant feedings and output. Breastfeeding Booklet and Warm line information given. Discussed typical  weight loss and the importance of infant weight checks with pediatrician 1-2 post discharge. 
  
Problem: Patient Education: Go to Patient Education Activity Goal: Patient/Family Education Outcome: Progressing Towards Goal 
LC discussed the following: 
  
Engorgement Care Guidelines:  Reviewed how milk is made and normal phases of milk production. Taught care of engorged breasts - frequent breastfeeding encouraged, cool packs and motrin as tolerated. Anticipatory guidance shared. 
  
 Care for sore/tender nipples discussed:  ways to improve positioning and latch practiced and discussed, hand express colostrum after feedings and let air dry, light application of lanolin, hydrogel pads, seek comfortable laid back feeding position, start feedings on least sore side first. 
  
Discussed eating a healthy diet. Instructed mother to eat a variety of foods in order to get a well balanced diet.  She should consume an extra 500 calories per day (more than her non-pregnant requirement.) These extra calories will help provide energy needed for optimal breast milk production. Mother also encouraged to \"drink to thirst\" and it is recommended that she drink fluids such as water, fruit/vegetable juice. Nutritious snacks should be available so that she can eat throughout the day to help satisfy her hunger and maintain a good milk supply. 
  
Comments: Pt will successfully establish breastfeeding by feeding in response to early feeding cues  
or wake every 3h, will obtain deep latch, and will keep log of feedings/output. Taught to BF at hunger cues and or q 2-3 hrs and to offer 10-20 drops of hand expressed colostrum at any non-feeds.   
  
Breast Assessment Left Breast: Large Left Nipple: Everted, Intact, Short Right Breast: Large Right Nipple: Everted, Intact, Short Breast- Feeding Assessment Attends Breast-Feeding Classes: Yes Breast-Feeding Experience: No 
Breast Trauma/Surgery: No 
Type/Quality: Good (Mother states baby has been latching on and breastfeeding well. ) Lactation Consultant Visits Breast-Feedings:  (Visitor in room . Mother last breast fed baby at 12:00 for 30 min. Mother to call [facility] when baby breastfeeds again.) Mother/Infant Observation Mother Observation: Alignment, Close hold, Recognizes feeding cues, Holds breast 
Infant Observation: Audible swallows, Feeding cues, Latches nipple and aereolae, Lips flanged, upper, Lips flanged, lower, Opens mouth, Rhythmic suck  
  
Mother states she started pumping (she only pumped once) due to baby's weight loss (infant weight loss is -7.4%). She is currently getting drops of colostrum. Mother reassured that this is a normal finding at this time. Discussed pumping and LC suggested she pump 3 times daily to help stimulate her breast milk supply.

## 2018-09-21 NOTE — DISCHARGE SUMMARY
Obstetrical Discharge Summary     Name: Antony Sanderson MRN: [id number]  SSN: [id number]    YOB: 1990  Age: 33 y.o. Sex: female      Admit Date: 2018    Discharge Date: 2018     Admitting Physician: Sherif Linares MD     Attending Physician:  Sherif Linares MD     Admission Diagnoses: history of breech presentation  Discharge Diagnoses:   Information for the patient's :  Ave Bond, Male [[id number]]   Delivery of a 7 lb 4.1 oz (3.29 kg) male infant via , Low Transverse on 2018 at 11:52 AM  by . Apgars were 9 and 9. Additional Diagnoses:   Hospital Problems  Date Reviewed: 2018          Codes Class Noted POA    Pregnancy ICD-10-CM: Z34.90  ICD-9-CM: V22.2  2018 Unknown             Lab Results   Component Value Date/Time    Rubella, External immune 03/15/2018    GrBStrep, External Negative 2018   Condition: good    Hospital Course: Baby has been breech up until 2 days prior to delivery. US day of delivery vtx. Pt requested elective CS after appropriate counseling of risks. Hx of gestational thrombocytopenia - plts down to 88K pp. Otherwise, normal pp course    Patient Instructions:   Current Discharge Medication List      START taking these medications    Details   ibuprofen (MOTRIN) 800 mg tablet Take 1 Tab by mouth every six (6) hours as needed for Pain. Qty: 60 Tab, Refills: 0      HYDROcodone-acetaminophen (NORCO) 5-325 mg per tablet Take 1 Tab by mouth every four (4) hours as needed for Pain. Max Daily Amount: 6 Tabs. Qty: 20 Tab, Refills: 0    Associated Diagnoses: Post-op pain         CONTINUE these medications which have NOT CHANGED    Details   prenatal vit calc,iron,folic (PRENATAL VITAMIN PO) Take  by mouth.      esomeprazole (NEXIUM) 20 mg capsule Take  by mouth daily. Reference my discharge instructions.     Follow-up Appointments   Procedures    FOLLOW UP VISIT Appointment in: 6 Weeks     Standing Status:   Standing     Number of Occurrences:   1     Order Specific Question:   Appointment in     Answer:   6 Weeks        Signed By:  Vargas Chavarria MD     September 21, 2018

## 2018-09-22 VITALS
DIASTOLIC BLOOD PRESSURE: 55 MMHG | BODY MASS INDEX: 35.87 KG/M2 | OXYGEN SATURATION: 99 % | HEIGHT: 61 IN | WEIGHT: 190 LBS | TEMPERATURE: 98.2 F | HEART RATE: 75 BPM | RESPIRATION RATE: 16 BRPM | SYSTOLIC BLOOD PRESSURE: 107 MMHG

## 2018-09-22 LAB
ERYTHROCYTE [DISTWIDTH] IN BLOOD BY AUTOMATED COUNT: 13.8 % (ref 11.5–14.5)
HCT VFR BLD AUTO: 25.7 % (ref 35–47)
HGB BLD-MCNC: 8.3 G/DL (ref 11.5–16)
MCH RBC QN AUTO: 30.7 PG (ref 26–34)
MCHC RBC AUTO-ENTMCNC: 32.3 G/DL (ref 30–36.5)
MCV RBC AUTO: 95.2 FL (ref 80–99)
NRBC # BLD: 0.02 K/UL (ref 0–0.01)
NRBC BLD-RTO: 0.3 PER 100 WBC
PLATELET # BLD AUTO: 109 K/UL (ref 150–400)
PMV BLD AUTO: 11.5 FL (ref 8.9–12.9)
RBC # BLD AUTO: 2.7 M/UL (ref 3.8–5.2)
WBC # BLD AUTO: 8 K/UL (ref 3.6–11)

## 2018-09-22 PROCEDURE — 77030036554

## 2018-09-22 PROCEDURE — 74011250637 HC RX REV CODE- 250/637: Performed by: STUDENT IN AN ORGANIZED HEALTH CARE EDUCATION/TRAINING PROGRAM

## 2018-09-22 PROCEDURE — 85027 COMPLETE CBC AUTOMATED: CPT | Performed by: OBSTETRICS & GYNECOLOGY

## 2018-09-22 PROCEDURE — 36415 COLL VENOUS BLD VENIPUNCTURE: CPT | Performed by: OBSTETRICS & GYNECOLOGY

## 2018-09-22 RX ORDER — IBUPROFEN 800 MG/1
800 TABLET ORAL
Qty: 60 TAB | Refills: 0 | Status: SHIPPED | OUTPATIENT
Start: 2018-09-22 | End: 2019-06-17

## 2018-09-22 RX ORDER — HYDROCODONE BITARTRATE AND ACETAMINOPHEN 5; 325 MG/1; MG/1
1-2 TABLET ORAL
Qty: 20 TAB | Refills: 0 | Status: SHIPPED | OUTPATIENT
Start: 2018-09-22 | End: 2019-06-17

## 2018-09-22 RX ADMIN — IBUPROFEN 800 MG: 800 TABLET ORAL at 10:19

## 2018-09-22 RX ADMIN — HYDROCODONE BITARTRATE AND ACETAMINOPHEN 2 TABLET: 5; 325 TABLET ORAL at 01:59

## 2018-09-22 RX ADMIN — DOCUSATE SODIUM 100 MG: 100 CAPSULE, LIQUID FILLED ORAL at 10:19

## 2018-09-22 RX ADMIN — IBUPROFEN 800 MG: 800 TABLET ORAL at 01:59

## 2018-09-22 RX ADMIN — HYDROCODONE BITARTRATE AND ACETAMINOPHEN 2 TABLET: 5; 325 TABLET ORAL at 07:12

## 2018-09-22 RX ADMIN — HYDROCODONE BITARTRATE AND ACETAMINOPHEN 2 TABLET: 5; 325 TABLET ORAL at 11:23

## 2018-09-22 NOTE — PROGRESS NOTES
Post-Operative  Day 3 Milly Thakkar Information for the patient's :  Yuridia Bishop, Male [[id number]] , Low Transverse Patient doing well without significant complaint. Tolerating diet, passing flatus, voiding and ambulating without difficulty Vitals: 
Visit Vitals  /55 (BP 1 Location: Right arm, BP Patient Position: At rest)  Pulse 75  Temp 98.2 °F (36.8 °C)  Resp 16  
 Ht 5' 1\" (1.549 m)  Wt 190 lb (86.2 kg)  LMP 2017  SpO2 99%  Breastfeeding Unknown  BMI 35.9 kg/m2 Temp (24hrs), Av.4 °F (36.9 °C), Min:98.2 °F (36.8 °C), Max:98.7 °F (37.1 °C) Exam:   
  Patient without distress. Abdomen, bowel sounds present, soft, expected tenderness, fundus firm Wound incision clean, dry and intact Lower extremities are negative for swelling, cords or tenderness. Labs:  
Lab Results Component Value Date/Time  WBC 9.7 2018 01:41 AM  
 WBC 9.6 2018 08:18 AM  
 WBC 9.7 2018 01:53 PM  
 WBC 10.8 2018 12:29 PM  
 WBC 10.2 2018 12:22 PM  
 WBC 11.5 (H) 2018 03:56 PM  
 WBC 9.5 2018 11:45 AM  
 HGB 8.3 (L) 2018 01:41 AM  
 HGB 11.1 (L) 2018 08:18 AM  
 HGB 10.6 (L) 2018 01:53 PM  
 HGB 11.2 2018 12:29 PM  
 HGB 11.0 (L) 2018 12:22 PM  
 HGB 11.1 2018 03:56 PM  
 HGB 11.5 2018 11:45 AM  
 HCT 25.7 (L) 2018 01:41 AM  
 HCT 33.4 (L) 2018 08:18 AM  
 HCT 32.9 (L) 2018 01:53 PM  
 HCT 34.1 2018 12:29 PM  
 HCT 34.2 2018 12:22 PM  
 HCT 34.7 2018 03:56 PM  
 HCT 34.3 2018 11:45 AM  
 PLATELET 88 (L)  01:41 AM  
 PLATELET 119 (L)  08:18 AM  
 PLATELET 235 (L)  01:53 PM  
 PLATELET 007 (L)  12:29 PM  
 PLATELET 693 (L)  12:22 PM  
 PLATELET 895 (L)  03:56 PM  
 PLATELET 992 (L)  11:45 AM  
 Hgb, External 11.1 08/06/2018 Hgb, External 11.5 07/09/2018 Hgb, External 12.7 03/15/2018 Hct, External 34.7 08/06/2018 Hct, External 34.3 07/09/2018 Hct, External 38.2 03/15/2018 Platelet cnt., External 110 09/17/2018 Platelet cnt., External 125 08/06/2018 Platelet cnt., External 131 07/09/2018 Platelet cnt., External 166 03/15/2018 No results found for this or any previous visit (from the past 24 hour(s)). Assessment: Post-Op day 3, doing well, Gestational thrombocytopenia Plan: 1. Discharge home today 2. Follow up in office in 6 weeks with Aliza Barajas MD 
3. Post partum activity/wound care advised, diet as tolerated 4. Discharge Medications: ibuprofen, percocet and medications prior to admission 5. Follow-up with plt count before discharge

## 2018-09-22 NOTE — DISCHARGE INSTRUCTIONS
POST DELIVERY DISCHARGE INSTRUCTIONS    Name: Vinh Red  YOB: 1990  Primary Diagnosis: Active Problems:    Pregnancy (2018)        General:     Diet/Diet Restrictions:  Eight 8-ounce glasses of fluid daily (water, juices); avoid excessive caffeine intake. Meals/snacks as desired which are high in fiber and carbohydrates and low in fat and cholesterol. Medications:         Physical Activity / Restrictions / Safety:     Avoid heavy lifting, no more that 8 lbs. For 2-3 weeks; No driving while taking narcotic pain medication. Post  patients should not drive until pain free. No intercourse 4-6 weeks, no douching or tampon use. May resume exercise in 6 weeks. Discharge Instructions/Special Treatment/Home Care Needs:     Continue prenatal vitamins. Continue to use squirt bottle with warm water on your episiotomy after each bathroom use until bleeding stops. If steri-strips applied to your incision, remove in 7 days. Take stool softeners daily. Call your doctor for the following:     Fever over 101 degrees by mouth. Vaginal bleeding heavier than a normal menstrual period or lost larger than a golf ball. Red streaks or increased swelling of legs, painful red streaks on your breast.  Painful urination, or increased pain, redness or discharge with your incision. Pain Management:     Pain Management:   Take Acetaminophen (Tylenol) or Ibuprofen (Advil, Motrin), as directed for pain. Use a warm Sitz bath 3 times daily to relieve episiotomy or hemorrhoidal discomfort. Heating pad to  incision as needed. For hemorrhoidal discomfort, use Tucks and Anusol cream as needed and directed.     Follow-Up Care:     Appointment with MD:   Follow-up Appointments   Procedures    FOLLOW UP VISIT Appointment in: 6 Weeks     Standing Status:   Standing     Number of Occurrences:   1     Order Specific Question:   Appointment in     Answer:   6 Weeks     Telephone number: [phone]    Signed By: Virgilio Cuello MD                                                                                                   Date: 9/21/2018 Time: 4:38 PM

## 2018-09-22 NOTE — PROGRESS NOTES
Pt off unit in stable condition via wheelchair with volunteers for discharge home per Dr. Daisha Henning. Pt is to follow-up in 6 weeks and is aware. Prescriptions given to pt. Pt. Denies any HA, dizziness, NV, or pain at this time. Infant in car seat with mother.

## 2018-09-25 ENCOUNTER — TELEPHONE (OUTPATIENT)
Dept: MIDWIFE SERVICES | Age: 28
End: 2018-09-25

## 2018-09-25 NOTE — TELEPHONE ENCOUNTER
Patient is calling with complaints of swelling in both of her legs from the knee down as well as swelling in both wrists. She reports she has been laying down some with extremities elevated, been watching her salt intake and also walking periodically for circulation. She is wondering if there is anything she can do or take to help due to it becoming very uncomfortable and even her wrists are becoming numb and tingly if left in the same position for just a little while. Please adivse.

## 2018-09-25 NOTE — TELEPHONE ENCOUNTER
Should resolve. This is normal after delivery due to all the fluids she got prior to delivery. It will resolve. We don't give meds to treat it.       As long as she does not have severe SOB with it should be ok

## 2018-11-07 ENCOUNTER — OFFICE VISIT (OUTPATIENT)
Dept: OBGYN CLINIC | Age: 28
End: 2018-11-07

## 2018-11-07 VITALS
SYSTOLIC BLOOD PRESSURE: 120 MMHG | HEIGHT: 61 IN | DIASTOLIC BLOOD PRESSURE: 70 MMHG | WEIGHT: 170.6 LBS | BODY MASS INDEX: 32.21 KG/M2

## 2018-11-07 RX ORDER — ACETAMINOPHEN AND CODEINE PHOSPHATE 120; 12 MG/5ML; MG/5ML
1 SOLUTION ORAL DAILY
Qty: 3 PACKAGE | Refills: 1 | Status: SHIPPED | OUTPATIENT
Start: 2018-11-07 | End: 2019-06-17

## 2018-11-07 RX ORDER — FLUCONAZOLE 200 MG/1
200 TABLET ORAL DAILY
Qty: 14 TAB | Refills: 0 | Status: SHIPPED | OUTPATIENT
Start: 2018-11-07 | End: 2018-11-21

## 2018-11-07 NOTE — PATIENT INSTRUCTIONS
Nutrition for Breastfeeding Mothers: Care Instructions  Your Care Instructions    If you are breastfeeding, your doctor may suggest that you eat more calories each day than otherwise recommended for a person of your height and weight. Breastfeeding helps build the bond between you and your baby. It gives your baby excellent health benefits. A healthy diet includes eating a variety of foods from the basic food groups: grains, vegetables, fruits, milk and milk products (such as cheese and yogurt), and meat and dried beans. Eating well during breastfeeding will ensure that you stay healthy. Follow-up care is a key part of your treatment and safety. Be sure to make and go to all appointments, and call your doctor if you are having problems. It's also a good idea to know your test results and keep a list of the medicines you take. How can you care for yourself at home? · Include 3 to 4 cups of nonfat or low-fat milk or milk products in your diet every day. These include:  ? Milk (8 ounces equals 1 cup). ? Ice cream (1½ cups equals 1 cup of milk). ? Cheese (1½ ounces of cheese equals 1 cup). ? Yogurt (8 ounces equals 1 cup). · Eat at least 7 ounces of grains, such as cereals, breads, crackers, rice, or pasta, every day. One ounce is about 1 slice of bread, 1 cup of breakfast cereal, or ½ cup of cooked rice, cereal, or pasta. · Eat 3 cups of vegetables each day. Choices include:  ? Dark-green vegetables such as broccoli and spinach. ? Orange vegetables such as carrots and sweet potatoes. ? Dried beans (such as chaney and kidney beans) and peas (such as lentils). · Every day, eat 2 cups of fresh, frozen, or canned fruit. · Eat 6½ ounces each day of protein, such as chicken, fish, lean meat, eggs, peanut butter, dried beans and peas, nuts, and seeds. One egg, 1 tablespoon of peanut butter, or ½ ounce of nuts or seeds equals 1 ounce of protein. A ½ cup of cooked beans equals 2 ounces of protein.   · Drink plenty of fluids, enough so that your urine is light yellow or clear like water. If you have kidney, heart, or liver disease and have to limit fluids, talk with your doctor before you increase the amount of fluids you drink. · Limit caffeine products, such as coffee, tea, chocolate, and some sodas. Caffeine can pass to your baby through breast milk. It may cause fussiness and sleep problems in babies. · Your doctor may recommend a vitamin supplement. Take it as recommended. · Consider joining a breastfeeding support group. These are offered at many hospitals and birthing centers by nurses, nurse-midwives, or lactation consultants. When should you call for help? Watch closely for changes in your health, and be sure to contact your doctor if you have any problems. Where can you learn more? Go to http://valerie-quique.info/. Enter P234 in the search box to learn more about \"Nutrition for Breastfeeding Mothers: Care Instructions. \"  Current as of: November 21, 2017  Content Version: 11.8  © 9206-5812 [employer]. Care instructions adapted under license by [employer] (which disclaims liability or warranty for this information). If you have questions about a medical condition or this instruction, always ask your healthcare professional. [address] any warranty or liability for your use of this information.

## 2018-11-07 NOTE — PROGRESS NOTES
Postpartum evaluation    Paula Altman is a 33 y.o. female who presents for a postpartum exam.     She is now six weeks post primary elective  section. Her baby is doing well. She has had no menses since delivery. She has had the following significant problems since her delivery: none    The patient is breast feeding. Baby was treated for thrush, patient states her nipples are not getting better. The patient would like to use Micronor for birth control. She is currently taking: no medications. She is due for her next AE in 6 months. Last pap 2018 neg. Visit Vitals  /70   Ht 5' 1\" (1.549 m)   Wt 170 lb 9.6 oz (77.4 kg)   Breastfeeding?  Yes   BMI 32.23 kg/m²       PHYSICAL EXAMINATION    Constitutional  · Appearance: well-nourished, well developed, alert, in no acute distress    HENT  · Head and Face: appears normal    Neck  · Inspection/Palpation: normal appearance, no masses or tenderness  · Lymph Nodes: no lymphadenopathy present  · Thyroid: gland size normal, nontender, no nodules or masses present on palpation    Breasts  · Inspection of Breasts: breasts symmetrical, no skin changes, no discharge present, nipple appearance normal, no skin retraction present, left nipple very pink  · Palpation of Breasts and Axillae: no masses present on palpation, no breast tenderness  · Axillary Lymph Nodes: no lymphadenopathy present    Gastrointestinal  · Abdominal Examination: abdomen non-tender to palpation, normal bowel sounds, no masses present  · Liver and spleen: no hepatomegaly present, spleen not palpable  · Hernias: no hernias identified    Genitourinary  · External Genitalia: normal appearance for age, no discharge present, no tenderness present, no inflammatory lesions present, no masses present, no atrophy present  · Vagina: normal vaginal vault without central or paravaginal defects, no discharge present, no inflammatory lesions present, no masses present  · Bladder: non-tender to palpation  · Urethra: appears normal  · Cervix: normal   · Uterus: normal size, shape and consistency  · Adnexa: no adnexal tenderness present, no adnexal masses present  · Perineum: perineum within normal limits, no evidence of trauma, no rashes or skin lesions present  · Anus: anus within normal limits, no hemorrhoids present  · Inguinal Lymph Nodes: no lymphadenopathy present    Skin  · General Inspection: no rash, no lesions identified    Neurologic/Psychiatric  · Mental Status:  · Orientation: grossly oriented to person, place and time  · Mood and Affect: mood normal, affect appropriate    Assessment:  Normal postpartum check  Yeast on nipple  Plan:  RTO for AE.  micronor  Diflucan 200mg x 14 days

## 2019-02-26 RX ORDER — DROSPIRENONE AND ETHINYL ESTRADIOL 0.03MG-3MG
1 KIT ORAL DAILY
Qty: 3 PACKAGE | Refills: 0 | Status: SHIPPED | OUTPATIENT
Start: 2019-02-26 | End: 2019-06-01 | Stop reason: SDUPTHER

## 2019-06-17 ENCOUNTER — OFFICE VISIT (OUTPATIENT)
Dept: OBGYN CLINIC | Age: 29
End: 2019-06-17

## 2019-06-17 VITALS
SYSTOLIC BLOOD PRESSURE: 126 MMHG | DIASTOLIC BLOOD PRESSURE: 82 MMHG | BODY MASS INDEX: 26.62 KG/M2 | WEIGHT: 141 LBS | HEIGHT: 61 IN

## 2019-06-17 DIAGNOSIS — Z11.51 SPECIAL SCREENING EXAMINATION FOR HUMAN PAPILLOMAVIRUS (HPV): ICD-10-CM

## 2019-06-17 DIAGNOSIS — Z01.419 ENCOUNTER FOR GYNECOLOGICAL EXAMINATION WITHOUT ABNORMAL FINDING: Primary | ICD-10-CM

## 2019-06-17 DIAGNOSIS — R32 URINARY INCONTINENCE, UNSPECIFIED TYPE: ICD-10-CM

## 2019-06-17 RX ORDER — DEXTROAMPHETAMINE SACCHARATE, AMPHETAMINE ASPARTATE, DEXTROAMPHETAMINE SULFATE AND AMPHETAMINE SULFATE 5; 5; 5; 5 MG/1; MG/1; MG/1; MG/1
TABLET ORAL
Refills: 0 | COMMUNITY
Start: 2019-06-06

## 2019-06-17 RX ORDER — DROSPIRENONE AND ETHINYL ESTRADIOL 0.03MG-3MG
KIT ORAL
Qty: 3 PACKAGE | Refills: 4 | Status: ON HOLD | OUTPATIENT
Start: 2019-06-17 | End: 2021-01-08

## 2019-06-17 NOTE — PATIENT INSTRUCTIONS

## 2019-06-17 NOTE — PROGRESS NOTES
Frances Jama is a ,  26 y.o. female [facility] whose Patient's last menstrual period was 2019 (exact date). who presents for her annual checkup. She has recently noticed some urinary leakage when she runs--this is recent. Menstrual status:    Her periods are normal in flow. She is using three to five pads or tampons per day, usually regular and occur every 26-30 days. She denies dysmenorrhea. She reports no premenstrual symptoms. Contraception:    The current method of family planning is OCP (estrogen/progesterone). Sexual history:    She  reports that she currently engages in sexual activity and has had partners who are Male. She reports using the following method of birth control/protection: Pill. Medical conditions:    Since her last annual GYN exam about one year ago, she has not the following changes in her health history: none. Pap and Mammogram History:    Her most recent Pap smear was normal obtained 3/15/2018. The patient has never had a mammogram.    The patient does not have a family history of breast cancer.     Past Medical History:   Diagnosis Date    Depression     Psychiatric problem      Past Surgical History:   Procedure Laterality Date    HX COLPOSCOPY  2016    neg path       Current Outpatient Medications   Medication Sig Dispense Refill    dextroamphetamine-amphetamine (ADDERALL) 20 mg tablet TAKE 1 TABLET BY MOUTH TWICE A DAY  0    drospirenone-ethinyl estradiol (TOMAS) 3-0.03 mg tab TAKE 1 TABLET BY MOUTH EVERY DAY 1 Package 0     Allergies: Sulfa (sulfonamide antibiotics)   Social History     Socioeconomic History    Marital status:      Spouse name: Not on file    Number of children: Not on file    Years of education: Not on file    Highest education level: Not on file   Occupational History    Not on file   Social Needs    Financial resource strain: Not on file    Food insecurity:     Worry: Not on file Inability: Not on file    Transportation needs:     Medical: Not on file     Non-medical: Not on file   Tobacco Use    Smoking status: Never Smoker    Smokeless tobacco: Never Used   Substance and Sexual Activity    Alcohol use: No    Drug use: No    Sexual activity: Yes     Partners: Male     Birth control/protection: Pill   Lifestyle    Physical activity:     Days per week: Not on file     Minutes per session: Not on file    Stress: Not on file   Relationships    Social connections:     Talks on phone: Not on file     Gets together: Not on file     Attends [employer] service: Not on file     Active member of club or organization: Not on file     Attends meetings of clubs or organizations: Not on file     Relationship status: Not on file    Intimate partner violence:     Fear of current or ex partner: Not on file     Emotionally abused: Not on file     Physically abused: Not on file     Forced sexual activity: Not on file   Other Topics Concern    Not on file   Social History Narrative    Not on file     Tobacco History:  reports that she has never smoked. She has never used smokeless tobacco.  Alcohol Abuse:  reports that she does not drink alcohol. Drug Abuse:  reports that she does not use drugs.     Patient Active Problem List   Diagnosis Code    Supervision of normal first pregnancy, antepartum Z34.00    Pregnancy Z34.90       Review of Systems - History obtained from the patient  Constitutional: negative for weight loss, fever, night sweats  HEENT: negative for hearing loss, earache, congestion, snoring, sorethroat  CV: negative for chest pain, palpitations, edema  Resp: negative for cough, shortness of breath, wheezing  GI: negative for change in bowel habits, abdominal pain, black or bloody stools  : negative for frequency, dysuria, hematuria, vaginal discharge  MSK: negative for back pain, joint pain, muscle pain  Breast: negative for breast lumps, nipple discharge, galactorrhea  Skin Bryan Alexis negative for itching, rash, hives  Neuro: negative for dizziness, headache, confusion, weakness  Psych: negative for anxiety, depression, change in mood  Heme/lymph: negative for bleeding, bruising, pallor    Physical Exam    Visit Vitals  /82 (BP 1 Location: Left arm, BP Patient Position: Sitting)   Ht 5' 1\" (1.549 m)   Wt 141 lb (64 kg)   LMP 06/03/2019 (Exact Date)   Breastfeeding?  Yes   BMI 26.64 kg/m²       Constitutional  · Appearance: well-nourished, well developed, alert, in no acute distress    HENT  · Head and Face: appears normal    Neck  · Inspection/Palpation: normal appearance, no masses or tenderness  · Lymph Nodes: no lymphadenopathy present  · Thyroid: gland size normal, nontender, no nodules or masses present on palpation    Chest  · Respiratory Effort: breathing normal  · Auscultation: normal breath sounds    Cardiovascular  · Heart:  · Auscultation: regular rate and rhythm without murmur    Breasts  · Inspection of Breasts: breasts symmetrical, no skin changes, no discharge present, nipple appearance normal, no skin retraction present  · Palpation of Breasts and Axillae: no masses present on palpation, no breast tenderness  · Axillary Lymph Nodes: no lymphadenopathy present    Gastrointestinal  · Abdominal Examination: abdomen non-tender to palpation, normal bowel sounds, no masses present  · Liver and spleen: no hepatomegaly present, spleen not palpable  · Hernias: no hernias identified    Genitourinary  · External Genitalia: normal appearance for age, no discharge present, no tenderness present, no inflammatory lesions present, no masses present, no atrophy present  · Vagina: normal vaginal vault without central or paravaginal defects, no discharge present, no inflammatory lesions present, no masses present  · Bladder: non-tender to palpation  · Urethra: appears normal  · Cervix: normal   · Uterus: normal size, shape and consistency  · Adnexa: no adnexal tenderness present, no adnexal masses present  · Perineum: perineum within normal limits, no evidence of trauma, no rashes or skin lesions present  · Anus: anus within normal limits, no hemorrhoids present  · Inguinal Lymph Nodes: no lymphadenopathy present    Skin  · General Inspection: no rash, no lesions identified    Neurologic/Psychiatric  · Mental Status:  · Orientation: grossly oriented to person, place and time  · Mood and Affect: mood normal, affect appropriate    . Assessment:  Routine gynecologic examination  incontinence.     Plan:  Counseled re: diet, exercise, healthy lifestyle  Return for yearly wellness visits  Pt counseled regarding co-testing for high risk HPV with pap  See PT for incontinence

## 2019-06-20 LAB
CYTOLOGIST CVX/VAG CYTO: NORMAL
CYTOLOGY CVX/VAG DOC CYTO: NORMAL
CYTOLOGY CVX/VAG DOC THIN PREP: NORMAL
DX ICD CODE: NORMAL
HPV I/H RISK 1 DNA CVX QL PROBE+SIG AMP: NEGATIVE
Lab: NORMAL
OTHER STN SPEC: NORMAL
STAT OF ADQ CVX/VAG CYTO-IMP: NORMAL

## 2019-06-26 ENCOUNTER — APPOINTMENT (OUTPATIENT)
Dept: PHYSICAL THERAPY | Age: 29
End: 2019-06-26

## 2019-06-29 LAB — PAP SMEAR, EXTERNAL: NORMAL

## 2019-07-09 ENCOUNTER — HOSPITAL ENCOUNTER (OUTPATIENT)
Dept: PHYSICAL THERAPY | Age: 29
Discharge: HOME | End: 2019-07-09
Attending: OBSTETRICS & GYNECOLOGY
Payer: COMMERCIAL

## 2019-07-09 PROCEDURE — 97110 THERAPEUTIC EXERCISES: CPT | Performed by: PHYSICAL THERAPIST

## 2019-07-09 PROCEDURE — 97112 NEUROMUSCULAR REEDUCATION: CPT | Performed by: PHYSICAL THERAPIST

## 2019-07-09 PROCEDURE — 97162 PT EVAL MOD COMPLEX 30 MIN: CPT | Performed by: PHYSICAL THERAPIST

## 2019-07-09 NOTE — PROGRESS NOTES
PT INITIAL EVALUATION NOTE 2-15    Patient Name: Fanta Maurer  Date:2019  : 1990  [x]  Patient  Verified  Payor: Rocky Edouard / Plan: Nesha De Jesusstephany 77 PPO / Product Type: Commerical /    In time:0300  Out time:0400  Total Treatment Time (min): 60  Visit #: 1     Treatment Area: Urinary incontinence [R32]    SUBJECTIVE  Pain Level (0-10 scale): 0/10  Any medication changes, allergies to medications, adverse drug reactions, diagnosis change, or new procedure performed?: [] No    [x] Yes (see summary sheet for update)  Subjective:     Delivered 1st child 17 via  due to baby breach position. Patient reports that since having baby she has had a few incidents of urine leakage. A few times at night which has resolved. And a few times with urge but was unable to hold it until making it to the restroom. Patient likes to run on the TM for 3-4 miles and has leakage almost every time. Her urge does appear stronger since having baby. Does not complain of painful IC or bowel dysfunction.       PLOF: WNL  Mechanism of Injury: postpartum  Previous Treatment/Compliance: n/a  PMHx/Surgical Hx:  2018  Work Hx: SAHM  Living Situation: lives with  and infant  Pt Goals: to eliminate leakage  Barriers: -  Motivation: yes  Substance use: none   FABQ Score: -  Cognition: A & O x 4        OBJECTIVE/EXAMINATION  Internal Vaginal Exam:  Layer 1: normal  Layer 2/3: no tenderness or deviations R-> L  Bladder neck mobility:Grade 1    PERFECT SCORE CHART  P =  Power (Laycock Scale Grade 0-5) 4/5  E =  Endurance (How long pt holds max contraction) 3 sec  R =  Repetitions (How many times the repeats holds)10x  F =  Fast Twitch (How many 1 second contractions in 10 seconds) unable to relax  E =  Elevation (Lift of post vaginal wall toward pubic bone) present  C =  Coordinated cocontraction of transverse abdominus - absent  T = Timing (squeeze and lift with cough) absent    SEMG:  Resting tone: 8.2mV  Avg work 10w10r: 16mV  Max work: 25mV    DRAM: 1:2:1      15 min Therapeutic Exercise:  [] See flow sheet :   Rationale: increase ROM, increase strength, improve coordination and increase proprioception to improve the patients ability to perform ADLs and        15 min Neuromuscular Re-education:  [x]  See flow sheet :sEMG biofeedback for assessment and PFM relaxation   Rationale: increase ROM, increase strength, improve coordination and increase proprioception  to improve the patients ability to perform ADLs and       With   [] TE   [] TA   [] neuro   [] other: Patient Education: [x] Review HEP    [] Progressed/Changed HEP based on:   [] positioning   [] body mechanics   [] transfers   [] heat/ice application    [] other:        Other Objective/Functional Measures:see above    Pain Level (0-10 scale) post treatment: 0      ASSESSMENT:      [x]  See Plan of [address] [place] [address], PT 7/9/2019

## 2019-07-24 ENCOUNTER — APPOINTMENT (OUTPATIENT)
Dept: PHYSICAL THERAPY | Age: 29
End: 2019-07-24
Attending: OBSTETRICS & GYNECOLOGY
Payer: COMMERCIAL

## 2019-08-07 ENCOUNTER — HOSPITAL ENCOUNTER (OUTPATIENT)
Dept: PHYSICAL THERAPY | Age: 29
Discharge: HOME | End: 2019-08-07
Attending: OBSTETRICS & GYNECOLOGY
Payer: COMMERCIAL

## 2019-08-07 PROCEDURE — 97112 NEUROMUSCULAR REEDUCATION: CPT | Performed by: PHYSICAL THERAPIST

## 2019-08-07 PROCEDURE — 97110 THERAPEUTIC EXERCISES: CPT | Performed by: PHYSICAL THERAPIST

## 2019-08-07 PROCEDURE — 97140 MANUAL THERAPY 1/> REGIONS: CPT | Performed by: PHYSICAL THERAPIST

## 2019-08-07 NOTE — PROGRESS NOTES
PT DAILY TREATMENT NOTE 2-15    Patient Name: Magan Hernandez  Date:2019  : 1990  [x]  Patient  Verified  Payor: Lisbeth Velasco / Plan: Carry Moi Schmidttoreyanabela 77 PPO / Product Type: Commerical /    In time:0500  Out time:0600  Total Treatment Time (min): 60  Visit #: 2 Visit count could not be calculated. Make sure you are using a visit which is associated with an episode. Treatment Area: Urinary incontinence [R32]    SUBJECTIVE  Pain Level (0-10 scale): 0/10  Any medication changes, allergies to medications, adverse drug reactions, diagnosis change, or new procedure performed?: [x] No    [] Yes (see summary sheet for update)  Subjective functional status/changes:   [] No changes reported  Patient reports that she has not had any bouts of leakage with running since last treatment. She has not noticed a change in her diastasis at this time.     OBJECTIVE        30 min Therapeutic Exercise:  [x] See flow sheet :   Rationale: increase ROM, increase strength and improve coordination to improve the patients ability to perform ADL     15 min Neuromuscular Re-education:  []  See flow sheet :sEMG for uptraining and coordination of breath with KEGEL and TrA   Rationale: increase ROM, increase strength, improve coordination and increase proprioception  to improve the patients ability to perform ADLs and work duties with KELLY    15 min Manual Therapy:  MET correction for pelvic alignment, manual cues for PFM and TrA during exercises   Rationale: decrease pain, increase ROM, increase tissue extensibility, decrease trigger points and increase postural awareness  to improve the patients ability to perform ADLs              With   [] TE   [] TA   [] neuro   [] other: Patient Education: [x] Review HEP    [] Progressed/Changed HEP based on:   [] positioning   [] body mechanics   [] transfers   [] heat/ice application    [] other:      Other Objective/Functional Measures: DRAM: 1.5/2/1.5     Pain Level (0-10 scale) post treatment: 01/10    ASSESSMENT/Changes in Function:   Patient tolerated progression of PFM and TrA exercises well today  Patient will continue to benefit from skilled PT services to modify and progress therapeutic interventions, address functional mobility deficits, address ROM deficits, address strength deficits, analyze and address soft tissue restrictions, analyze and cue movement patterns, analyze and modify body mechanics/ergonomics and assess and modify postural abnormalities to attain remaining goals.      []  See Plan of Care  []  See progress note/recertification  []  See Discharge Summary         Progress towards goals / Updated goals:  Patient demonstrates good potential to meet all PT goals    PLAN  [x]  Upgrade activities as tolerated     []  Continue plan of care  []  Update interventions per flow sheet       []  Discharge due to:_  []  Other:_      Je Archer, PT 8/7/2019

## 2019-08-07 NOTE — PROGRESS NOTES
[address] [place] [place] [address], [address]  Phone: [phone]  Fax: [phone]    Plan of Care/ Statement of Necessity for Physical Therapy Services 2-15    Patient name: Peg Sanchez  : 1990  Provider#: [id number]  Referral source: Anshu Wilson MD      Medical/Treatment Diagnosis: Urinary incontinence [R32]     Prior Hospitalization: see medical history     Comorbidities: none  Prior Level of Function: see eval  Medications: Verified on Patient Summary List    Start of Care: 19      Onset Date: 10 months       The Plan of Care and following information is based on the information from the initial evaluation. Assessment/ key information: Patient is a 26year old female who delivered her 1st child via  in 2019. She has experienced signs and symptoms associated with KELLY and diastasis recti since childbirth. Including strength deficits of the TrA and pelvic floor, poor postural awareness and coordination, decreased flexibility, and low back pain. She will benefit from skilled PT to address these problems so that she can return to ADLs at [facility]. Evaluation Complexity History MEDIUM  Complexity : 1-2 comorbidities / personal factors will impact the outcome/ POC ; Examination MEDIUM Complexity : 3 Standardized tests and measures addressing body structure, function, activity limitation and / or participation in recreation  ;Presentation MEDIUM Complexity : Evolving with changing characteristics  ; Clinical Decision Making MEDIUM Complexity : FOTO score of 26-74  Overall Complexity Rating: MEDIUM    Problem List: pain affecting function, decrease ROM, decrease strength, impaired gait/ balance, decrease ADL/ functional abilitiies, decrease activity tolerance and decrease flexibility/ joint mobility   Treatment Plan may include any combination of the following: Therapeutic exercise, Therapeutic activities, Neuromuscular re-education, Physical agent/modality, Manual therapy, Patient education, Self Care training and Functional mobility training  Patient / Family readiness to learn indicated by: asking questions, trying to perform skills and interest  Persons(s) to be included in education: patient (P)  Barriers to Learning/Limitations: None  Patient Goal (s): to eliminate leakage  Patient Self Reported Health Status: excellent  Rehabilitation Potential: excellent    Short Term Goals: To be accomplished in 6 weeks:  Patient will be independent with a progressive home exercise program    Patient will demonstrate & utilized pelvic floor ms protection techniques   Patient will demonstrate improved PFM strength to 3+/5 bilaterally in order to decrease UI symptoms   Patient will be independent with urge suppression techniques, bladder irritant elmination   Patient will complete a 72 hour bladder diary for analysis      Long Term Goals: To be accomplished in 12 weeks:  Patient will demonstrate 4/5 PFM strength bilaterally in order to eliminate UI symptoms. Patient will demonstrate 10 second PFM holds at 4/5 in order to elminate UI symptoms. Patient will have no loss of urine with cough/sneeze. Patient will have no loss of urine with urge triggers (key in door, pulling pants down)    Frequency / Duration: Patient to be seen 1-2 times per week for 12 weeks. Patient/ Caregiver education and instruction: self care, activity modification and exercises    [x]  Plan of care has been reviewed with KIKO Serrano, PT 8/7/2019     ________________________________________________________________________    I certify that the above Therapy Services are being furnished while the patient is under my care. I agree with the treatment plan and certify that this therapy is necessary.     [de-identified] Signature:____________________  Date:____________Time: _________

## 2019-08-14 ENCOUNTER — APPOINTMENT (OUTPATIENT)
Dept: PHYSICAL THERAPY | Age: 29
End: 2019-08-14
Attending: OBSTETRICS & GYNECOLOGY
Payer: COMMERCIAL

## 2020-01-10 NOTE — PROGRESS NOTES
[address] [place] [address] [address]  Phone: [phone]  Fax: [phone]    Discharge Summary  2-15    Patient name: Ford Santiago  : 1990  Provider#: [id number]  Referral source: Reina Venegas MD      Medical/Treatment Diagnosis: Urinary incontinence [R32]     Prior Hospitalization: see medical history     Comorbidities: See Plan of Care  Prior Level of Function:See Plan of Care  Medications: Verified on Patient Summary List    Start of Care: 19      Onset Date:6+ mths   Visits from Start of Care: 2     Missed Visits: 0  Reporting Period : 19 to 19      ASSESSMENT/SUMMARY OF CARE: Patient seen for post partum related DRAM.  Provided with thorough HEP for continued Sx management. Unable to return to clinic due to her work schedule. Unable to assess goals at this time.           RECOMMENDATIONS:  [x]Discontinue therapy: [x]Patient has reached or is progressing toward set goals      []Patient is non-compliant or has abdicated      []Due to lack of appreciable progress towards set goals      []Other    Adri Long, PT 1/10/2020

## 2020-03-26 ENCOUNTER — TELEPHONE (OUTPATIENT)
Dept: OBGYN CLINIC | Age: 30
End: 2020-03-26

## 2020-03-26 ENCOUNTER — LAB ONLY (OUTPATIENT)
Dept: OBGYN CLINIC | Age: 30
End: 2020-03-26

## 2020-03-26 DIAGNOSIS — N92.6 IRREGULAR PERIODS: Primary | ICD-10-CM

## 2020-03-26 NOTE — TELEPHONE ENCOUNTER
Patient advised of MD recommendations and was placed on the schedule for progesterone levels and quant beta levels today at 2:40PM    Patient provided with instructions for coming to the office and that she would be screened. Patient verbalized understanding.

## 2020-03-26 NOTE — TELEPHONE ENCOUNTER
Call received at 1:15PM      31year old patient LMP was 2/11/2020 ( 6w2d)    Patient reports having some vaginal bleeding from 3/10-3/14 that has stopped. Patient got a positive upt on 3/22/2020    Patient denies vaginal bleeding and reports a little cramping. Patient did a upt to day that is coming back a faint positive. Patient is wondering how to proceed. Patient wondering if it is a chemical pregnancy? ?        ?  Offer lab visit    Please advise

## 2020-03-27 LAB
HCG INTACT+B SERPL-ACNC: 5 MIU/ML
PROGEST SERPL-MCNC: 0.8 NG/ML

## 2020-04-02 ENCOUNTER — VIRTUAL VISIT (OUTPATIENT)
Dept: OBGYN CLINIC | Age: 30
End: 2020-04-02

## 2020-04-02 VITALS — BODY MASS INDEX: 24.17 KG/M2 | HEIGHT: 61 IN | WEIGHT: 128 LBS

## 2020-04-02 DIAGNOSIS — Z31.89 ENCOUNTER FOR FERTILITY PLANNING: Primary | ICD-10-CM

## 2020-04-02 NOTE — PROGRESS NOTES
[employer] Video visit  Yolanda Stuart is a 31 y.o. female who was seen by synchronous (real-time) audio-video technology on 4/2/2020. Chief Complaint   Follow-up and Family Planning      HPI  Yolanda Stuart is a 31 y.o. female   Patient's last menstrual period was 03/10/2020 (approximate). She had a recent chemical pregnancy. Stopped OCP in November. Cycles are q 25-26 days. She thought she had a pos OPT on day 21 of her last cycle but then had faintly pos uPT several days later. Patient would like to discuss recent blood work, she has been trying to conceive for 6 months. Her and her spouse have 1 child together, born 9/19/2018. She got pregnant with one month of trying with last pregnancy    Sister has PCOS and fertility issues so she is worried. She denies any health problems. Currently taking Adderall, 20mg daily. Recent blood work in cc. Past Medical History:   Diagnosis Date    Depression     Psychiatric problem      Past Surgical History:   Procedure Laterality Date    HX COLPOSCOPY  11/2016    neg path     Social History     Occupational History    Not on file   Tobacco Use    Smoking status: Never Smoker    Smokeless tobacco: Never Used   Substance and Sexual Activity    Alcohol use: No    Drug use: No    Sexual activity: Yes     Partners: Male     Birth control/protection: Pill     Family History   Problem Relation Age of Onset    Hypertension Mother     Lupus Sister     Breast Cancer Maternal Grandmother         Breast, kidney and bone CA       Allergies   Allergen Reactions    Sulfa (Sulfonamide Antibiotics) Rash     Prior to Admission medications    Medication Sig Start Date End Date Taking?  Authorizing Provider   dextroamphetamine-amphetamine (ADDERALL) 20 mg tablet TAKE 1 TABLET BY MOUTH TWICE A DAY 6/6/19   Provider, Historical   drospirenone-ethinyl estradiol (TOMAS) 3-0.03 mg tab TAKE 1 TABLET BY MOUTH EVERY DAY 6/17/19   Sarita Antonio MD        Review of Systems: History obtained from the patient  Constitutional: negative for weight loss, fever, night sweats  HEENT: negative for hearing loss, earache, congestion, snoring, sorethroat  CV: negative for chest pain, palpitations, edema  Resp: negative for cough, shortness of breath, wheezing  Breast: negative for breast lumps, nipple discharge, galactorrhea  GI: negative for change in bowel habits, abdominal pain, black or bloody stools  : negative for frequency, dysuria, hematuria, vaginal discharge  MSK: negative for back pain, joint pain, muscle pain  Skin: negative for itching, rash, hives  Neuro: negative for dizziness, headache, confusion, weakness  Psych: negative for anxiety, depression, change in mood  Heme/lymph: negative for bleeding, bruising, pallor    Objective:  Visit Vitals  Ht 5' 1\" (1.549 m)   Wt 128 lb (58.1 kg)   LMP 03/10/2020 (Approximate)   BMI 24.19 kg/m²       Physical Exam:   PHYSICAL EXAMINATION    General: alert, cooperative, no distress   Mental  status: mental status: alert, oriented to person, place, and time, normal mood, behavior, speech, dress, motor activity, and thought processes   Resp: resp: normal effort and no respiratory distress   Neuro: neuro: no gross deficits   Skin: skin: no discoloration or lesions of concern on visible areas   Due to this being a TeleHealth evaluation, many elements of the physical examination are unable to be assessed. Assessment:   Fertility counseling    Plan:   Disc reproductive cycle - she is likely ovulating closer to day 11/12. Would start OPT by day 8-10. [place] start day 8. Take vits  If wants to check prog level need to do 7 days post positive OPT.  mychart with ovulation test results      RTO prn if symptoms persist or worsen. Instructions given to pt. Handouts given to pt.       15 minutes was spent face to face with patient and >50% was spent counseling          We discussed the expected course, resolution and complications of the diagnosis(es) in detail. Medication risks, benefits, costs, interactions, and alternatives were discussed as indicated. I advised her to contact the office if her condition worsens, changes or fails to improve as anticipated. She expressed understanding with the diagnosis(es) and plan. Pursuant to the emergency declaration under the [address] [place] [address] waiver authority and the [employer] and [employer] Act, this Virtual  Visit was conducted, with patient's consent, to reduce the patient's risk of exposure to COVID-19 and provide continuity of care for an established patient. Services were provided through a video synchronous discussion virtually to substitute for in-person clinic visit.

## 2020-04-15 LAB — PAP SMEAR, EXTERNAL: NORMAL

## 2020-05-12 ENCOUNTER — LAB ONLY (OUTPATIENT)
Dept: OBGYN CLINIC | Age: 30
End: 2020-05-12

## 2020-05-12 DIAGNOSIS — N92.6 IRREGULAR PERIODS: Primary | ICD-10-CM

## 2020-05-13 LAB — HCG INTACT+B SERPL-ACNC: 69 MIU/ML

## 2020-05-13 NOTE — PROGRESS NOTES
Viewed by Nivia Nolasco on 5/13/2020 10:29 AM    Patient responded and will come in tomorrow at 9:30 for lab only

## 2020-05-14 ENCOUNTER — LAB ONLY (OUTPATIENT)
Dept: OBGYN CLINIC | Age: 30
End: 2020-05-14

## 2020-05-14 DIAGNOSIS — N92.6 IRREGULAR PERIODS: Primary | ICD-10-CM

## 2020-05-15 LAB — HCG INTACT+B SERPL-ACNC: 218 MIU/ML

## 2020-05-29 ENCOUNTER — OFFICE VISIT (OUTPATIENT)
Dept: OBGYN CLINIC | Age: 30
End: 2020-05-29

## 2020-05-29 VITALS
WEIGHT: 136 LBS | DIASTOLIC BLOOD PRESSURE: 64 MMHG | BODY MASS INDEX: 25.68 KG/M2 | HEIGHT: 61 IN | SYSTOLIC BLOOD PRESSURE: 108 MMHG

## 2020-05-29 DIAGNOSIS — Z34.80 SUPERVISION OF OTHER NORMAL PREGNANCY, ANTEPARTUM: Primary | ICD-10-CM

## 2020-05-29 LAB
ANTIBODY SCREEN, EXTERNAL: NEGATIVE
CHLAMYDIA, EXTERNAL: NEGATIVE
HBSAG, EXTERNAL: NEGATIVE
HCT, EXTERNAL: 40.9
HGB, EXTERNAL: 13.5
HIV, EXTERNAL: NEGATIVE
N. GONORRHEA, EXTERNAL: NEGATIVE
PLATELET CNT,   EXTERNAL: 155
RUBELLA, EXTERNAL: NORMAL
T. PALLIDUM, EXTERNAL: NEGATIVE
TYPE, ABO & RH, EXTERNAL: NORMAL
URINALYSIS, EXTERNAL: NEGATIVE

## 2020-05-29 NOTE — PROGRESS NOTES
Current pregnancy history:    Yolanda Stuart is a 31 y.o. female who presents for the evaluation of pregnancy. Patient is unsure of LMP, she had a recent chemical pregnancy 2020. LMP history:  The date of her LMP is uncertain. Last menstrual period prior to chemical pregnancy was 03/10/2020 (approximate). .   A urine pregnancy test was positive a few weeks ago. She was not on the pill at conception. Based on her LMP, her EDC is uncertain. Her menstrual cycles are regular and occur approximately every 28 days  and range from 3 to 5 days. The last menses lasted the usual number of days. Ultrasound data:  She had an  ultrasound done by the ultrasound tech today which revealed a viable johnson pregnancy with a gestational age of 7 weeks and 1 days giving an [address] of 2021. TV ULTRASOUND PERFORMED. A SINGLE VIABLE 6W1D IUP IS SEEN WITH NORMAL CARDIAC RHYTHM. GESTATIONAL AGE BASED ON TODAYS US.  A NORMAL APPEARING YOLK [address] IS SEEN. RIGHT & LEFT OVARIES APPEAR WITHIN NORMAL LIMITS. NO FREE FLUID SEEN IN THE CDS. Pregnancy symptoms:    Since her LMP she has experienced  urinary frequency, breast tenderness, and nausea. She has not been vomiting over the last few weeks. Associated signs and symptoms which she denies: dysuria, discharge, vaginal bleeding. She states she has gained weight:  Approximately 5 pounds over the last few weeks. Relevant past pregnancy history:   She has the following pregnancy history:    She has no history of  delivery. Hx of  X1- Breech     NT with G1   Varicella immune 2018   Blood type B+    Relevant past medical history:(relevant to this pregnancy): noncontributory. 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 Substance history: negative for alcohol, tobacco and street drugs.            Positive for nothing. Exposure history: There are no indoor cat/s in the home. She denies close contact with children on a regular basis. She has had chicken pox in the past.   Patient denies issues with domestic violence. Genetic Screening/Teratology Counseling: (Includes patient, baby's father, or anyone in either family with:)  3.  Patient's age >/= 28 at [facility]?-- no  .   2. Thalassemia ([facility], [place], [address], or  background): MCV<80?--no.     3.  Neural tube defect (meningomyelocele, spina bifida, anencephaly)?--no.   4.  Congenital heart defect?--no.  5.  Down syndrome?--no.   6.  Jacky-Sachs ([place], [place])?--no.   7.  Canavan's Disease?--no.   8.  Familial Dysautonomia?--no.   9.  Sickle cell disease or trait ()? --no   The patient has not been tested for sickle trait  10. Hemophilia or other blood disorders?--no. 11.  Muscular dystrophy?--no. 12.  Cystic fibrosis?--no. 13.  Libby's Chorea?--no. 14.  Mental retardation/autism (if yes was person tested for Fragile X)?--no. 15.  Other inherited genetic or chromosomal disorder?--no. 12.  Maternal metabolic disorder (DM, PKU, etc)?--no. 17.  Patient or FOB with a child with a birth defect not listed above?--no.  17a. Patient or FOB with a birth defect themselves?--no. 18.  Recurrent pregnancy loss, or stillbirth?--no. 19.  Any medications since LMP other than prenatal vitamins (include vitamins,  supplements, OTC meds, drugs, alcohol)?--no. 20.  Any other genetic/environmental exposure to discuss?--no. Infection History:  1. Lives with someone with TB or TB exposed?--no.   2.  Patient or partner has history of genital herpes?--no.  3.  Rash or viral illness since LMP?--no.    4.  History of STD (GC, CT, HPV, syphilis, HIV)? --no   5.  Other: OTHER?      OB History    Para Term  AB Living   2 1 1 0 0 1   SAB TAB Ectopic Molar Multiple Live Births   0 0 0 0 0 1      # Outcome Date GA Lbr Arnoldo/2nd Weight Sex Delivery Anes PTL Lv   2 Current            1 Term 09/19/18 39w0d  7 lb 4.1 oz (3.29 kg) M CS-LTranv SPINAL AN N SARAH      Name: Janiya Burgos: Tad  Apgar5: 9         Past Medical History:   Diagnosis Date    Depression     Psychiatric problem      Past Surgical History:   Procedure Laterality Date    HX COLPOSCOPY  11/2016    neg path     Social History     Occupational History    Not on file   Tobacco Use    Smoking status: Never Smoker    Smokeless tobacco: Never Used   Substance and Sexual Activity    Alcohol use: No    Drug use: No    Sexual activity: Yes     Partners: Male     Birth control/protection: Pill     Family History   Problem Relation Age of Onset    Hypertension Mother     Lupus Sister     Breast Cancer Maternal Grandmother         Breast, kidney and bone CA       Allergies   Allergen Reactions    Sulfa (Sulfonamide Antibiotics) Rash     Prior to Admission medications    Medication Sig Start Date End Date Taking?  Authorizing Provider   dextroamphetamine-amphetamine (ADDERALL) 20 mg tablet TAKE 1 TABLET BY MOUTH TWICE A DAY 6/6/19   Provider, Historical   drospirenone-ethinyl estradiol (Cece [place]) 3-0.03 mg tab TAKE 1 TABLET BY MOUTH EVERY DAY 6/17/19   Jamie Bartlett MD        Review of Systems: History obtained from the patient  Constitutional: negative for weight loss, fever, night sweats  HEENT: negative for hearing loss, earache, congestion, snoring, sorethroat  CV: negative for chest pain, palpitations, edema  Resp: negative for cough, shortness of breath, wheezing  Breast: negative for breast lumps, nipple discharge, galactorrhea  GI: negative for change in bowel habits, abdominal pain, black or bloody stools  : negative for frequency, dysuria, hematuria, vaginal discharge  MSK: negative for back pain, joint pain, muscle pain  Skin: negative for itching, rash, hives  Neuro: negative for dizziness, headache, confusion, weakness  Psych: negative for anxiety, depression, change in mood  Heme/lymph: negative for bleeding, bruising, pallor    Objective:  Visit Vitals  /64   Ht 5' 1\" (1.549 m)   Wt 136 lb (61.7 kg)   LMP 03/10/2020 (Approximate)   BMI 25.70 kg/m²       Physical Exam:   PHYSICAL EXAMINATION    Constitutional  · Appearance: well-nourished, well developed, alert, in no acute distress    HENT  · Head  · Face: appears normal  · Eyes: appear normal  · Ears: normal  · Mouth: normal  · Lips: no lesions    Neck  · Inspection/Palpation: normal appearance, no masses or tenderness  · Lymph Nodes: no lymphadenopathy present  · Thyroid: gland size normal, nontender, no nodules or masses present on palpation    Chest  · Respiratory Effort: breathing unlabored  · Auscultation: normal breath sounds    Cardiovascular  · Heart:  · Auscultation: regular rate and rhythm without murmur    Breasts  · Inspection of Breasts: breasts symmetrical, no skin changes, no discharge present, nipple appearance normal, no skin retraction present  · Palpation of Breasts and Axillae: no masses present on palpation, no breast tenderness  · Axillary Lymph Nodes: no lymphadenopathy present    Gastrointestinal  · Abdominal Examination: abdomen non-tender to palpation, normal bowel sounds, no masses present  · Liver and spleen: no hepatomegaly present, spleen not palpable  · Hernias: no hernias identified    Genitourinary  · External Genitalia: normal appearance for age, no discharge present, no tenderness present, no inflammatory lesions present, no masses present, no atrophy present  · Vagina: normal vaginal vault without central or paravaginal defects, no discharge present, no inflammatory lesions present, no masses present  · Bladder: non-tender to palpation  · Urethra: appears normal  · Cervix: normal   · Uterus: enlarged, normal shape, soft  · Adnexa: no adnexal tenderness present, no adnexal masses present  · Perineum: perineum within normal limits, no evidence of trauma, no rashes or skin lesions present  · Anus: anus within normal limits, no hemorrhoids present  · Inguinal Lymph Nodes: no lymphadenopathy present    Skin  · General Inspection: no rash, no lesions identified    Neurologic/Psychiatric  · Mental Status:  · Orientation: grossly oriented to person, place and time  · Mood and Affect: mood normal, affect appropriate    Assessment:   Intrauterine pregnancy with the following problems identified:   EDC determined next visit  Hx of CS for breech  adderall        Plan:     Offered CF testing, CVS, Nuchal Translucency, MSAFP, amnio, and discussed NIPT  Course of pregnancy discussed including visit schedule, routine U/S, glucola testing, etc.  Avoid alcoholic beverages and illicit/recreational drugs use  Take prenatal vitamins or folic acid daily. Hospital and practice style discussed with coverage system. Discussed nutrition, toxoplasmosis precautions, sexual activity, exercise, need for influenza vaccine, environmental and work hazards, travel advice, screen for domestic violence, need for seat belts. Discussed seafood, unpasteurized dairy products, deli meat, artificial sweeteners, and caffeine. Information on prenatal classes/breastfeeding given. Information on circumcision given  Patient encouraged not to smoke. Discussed current prescription drug use. Given medication list.  Discussed the use of over the counter medications and chemicals. Route of delivery discussed, including risks, benefits, and alternatives of  versus repeat LTCS. Pt understands risk of hemorrhage during pregnancy and post delivery and would accept blood products if necessary in life-threatening emergencies      Handouts given to pt.

## 2020-06-02 LAB
ABO GROUP BLD: NORMAL
BACTERIA UR CULT: NO GROWTH
BLD GP AB SCN SERPL QL: NEGATIVE
ERYTHROCYTE [DISTWIDTH] IN BLOOD BY AUTOMATED COUNT: 13 % (ref 11.7–15.4)
HBV SURFACE AG SERPL QL IA: NEGATIVE
HCT VFR BLD AUTO: 40.9 % (ref 34–46.6)
HGB BLD-MCNC: 13.5 G/DL (ref 11.1–15.9)
HIV 1+2 AB+HIV1 P24 AG SERPL QL IA: NON REACTIVE
MCH RBC QN AUTO: 30.8 PG (ref 26.6–33)
MCHC RBC AUTO-ENTMCNC: 33 G/DL (ref 31.5–35.7)
MCV RBC AUTO: 93 FL (ref 79–97)
PLATELET # BLD AUTO: 155 X10E3/UL (ref 150–450)
RBC # BLD AUTO: 4.39 X10E6/UL (ref 3.77–5.28)
RH BLD: POSITIVE
RUBV IGG SERPL IA-ACNC: 2.92 INDEX
TREPONEMA PALLIDUM IGG+IGM AB [PRESENCE] IN SERUM OR PLASMA BY IMMUNOASSAY: NON REACTIVE
WBC # BLD AUTO: 5.3 X10E3/UL (ref 3.4–10.8)

## 2020-06-04 DIAGNOSIS — Z34.81 ENCOUNTER FOR SUPERVISION OF OTHER NORMAL PREGNANCY, FIRST TRIMESTER: ICD-10-CM

## 2020-06-10 ENCOUNTER — ROUTINE PRENATAL (OUTPATIENT)
Dept: OBGYN CLINIC | Age: 30
End: 2020-06-10

## 2020-06-10 VITALS — BODY MASS INDEX: 25.32 KG/M2 | WEIGHT: 134 LBS | DIASTOLIC BLOOD PRESSURE: 60 MMHG | SYSTOLIC BLOOD PRESSURE: 107 MMHG

## 2020-06-10 DIAGNOSIS — Z34.81 ENCOUNTER FOR SUPERVISION OF OTHER NORMAL PREGNANCY, FIRST TRIMESTER: ICD-10-CM

## 2020-06-10 PROBLEM — Z34.00 SUPERVISION OF NORMAL FIRST PREGNANCY, ANTEPARTUM: Status: RESOLVED | Noted: 2018-06-18 | Resolved: 2020-06-10

## 2020-06-10 PROBLEM — Z34.90 PREGNANCY: Status: RESOLVED | Noted: 2018-09-19 | Resolved: 2020-06-10

## 2020-06-10 NOTE — PROGRESS NOTES
TA ULTRASOUND PERFORMED. A SINGLE VIABLE 8W0D IUP IS SEEN WITH NORMAL CARDIAC RHYTHM. GESTATIONAL AGE BASED ON TODAYS US.  A NORMAL APPEARING YOLK [address] IS SEEN. RIGHT & LEFT OVARIES APPEAR WITHIN NORMAL LIMITS. NO FREE FLUID SEEN IN THE CDS.     Problem List  Date Reviewed: 5/29/2020          Codes Class Noted    Encounter for supervision of other normal pregnancy, first trimester ICD-10-CM: Z34.81  ICD-9-CM: V22.1  6/4/2020    Overview Signed 6/4/2020  8:33 AM by Jose Alfredo Kelly LPN     EDC determined next visit  Hx of CS for breech  adderall

## 2020-06-10 NOTE — PATIENT INSTRUCTIONS

## 2020-06-10 NOTE — PROGRESS NOTES
US today [facility] 1/20/2021 - LMP is unknown    Wants NIPTS/Horizon - will do dual kit at around 10 weeks - will message to come in    See me at 12 weeks  Some nausea - does not want anything, will call if gets worse

## 2020-06-11 LAB
C TRACH RRNA SPEC QL NAA+PROBE: NEGATIVE
N GONORRHOEA RRNA SPEC QL NAA+PROBE: NEGATIVE
T VAGINALIS DNA SPEC QL NAA+PROBE: NEGATIVE

## 2020-06-29 ENCOUNTER — LAB ONLY (OUTPATIENT)
Dept: OBGYN CLINIC | Age: 30
End: 2020-06-29

## 2020-06-29 DIAGNOSIS — Z34.81 ENCOUNTER FOR SUPERVISION OF OTHER NORMAL PREGNANCY, FIRST TRIMESTER: Primary | ICD-10-CM

## 2020-06-29 LAB
NIPT, EXTERNAL: NORMAL
NIPT, EXTERNAL: NORMAL

## 2020-07-08 ENCOUNTER — TELEPHONE (OUTPATIENT)
Dept: OBGYN CLINIC | Age: 30
End: 2020-07-08

## 2020-07-08 NOTE — TELEPHONE ENCOUNTER
Patient is calling for [place]. May I report negative for 27 out of 27 diseases, I do not believe it has been signed off? [place] is still pending. ...

## 2020-07-10 ENCOUNTER — ROUTINE PRENATAL (OUTPATIENT)
Dept: OBGYN CLINIC | Age: 30
End: 2020-07-10

## 2020-07-10 VITALS
WEIGHT: 136 LBS | BODY MASS INDEX: 25.68 KG/M2 | DIASTOLIC BLOOD PRESSURE: 71 MMHG | HEIGHT: 61 IN | SYSTOLIC BLOOD PRESSURE: 118 MMHG

## 2020-07-10 DIAGNOSIS — Z34.80 SUPERVISION OF OTHER NORMAL PREGNANCY, ANTEPARTUM: Primary | ICD-10-CM

## 2020-07-10 NOTE — PROGRESS NOTES
Problem List  Date Reviewed: 6/10/2020          Codes Class Noted    Encounter for supervision of other normal pregnancy, first trimester ICD-10-CM: Z34.81  ICD-9-CM: V22.1  6/4/2020    Overview Addendum 7/9/2020  9:42 AM by Cordelia Hill LPN     EDC determined next visit  Hx of CS for breech  adderall  Horizon neg

## 2020-07-10 NOTE — PATIENT INSTRUCTIONS
Weeks 10 to 14 of Your Pregnancy: Care Instructions Your Care Instructions By weeks 10 to 15 of your pregnancy, the placenta has formed inside your uterus. It is possible to hear your baby's heartbeat with a special ultrasound device. Your baby's eyes can and do move. The arms and legs can bend. This is a good time to think about testing for birth defects. There are two types of tests: screening and diagnostic. Screening tests show the chance that a baby has a certain birth defect. They can't tell you for sure that your baby has a problem. Diagnostic tests show if a baby has a certain birth defect. It's your choice whether to have these tests. You and your partner can talk to your doctor or midwife about birth defects tests. Follow-up care is a key part of your treatment and safety. Be sure to make and go to all appointments, and call your doctor if you are having problems. It's also a good idea to know your test results and keep a list of the medicines you take. How can you care for yourself at home? Decide about tests · You can have screening tests and diagnostic tests to check for birth defects. The decision to have a test for birth defects is personal. Think about your age, your chance of passing on a family disease, your need to know about any problems, and what you might do after you have the test results. ? Triple or quadruple (quad) blood tests. These screening tests can be done between 15 and 20 weeks of pregnancy. They check the amounts of three or four substances in your blood. The doctor looks at these test results, along with your age and other factors, to find out the chance that your baby may have certain problems. ? Amniocentesis. This diagnostic test is used to look for chromosomal problems in the baby's cells.  It can be done between 15 and 20 weeks of pregnancy, usually around week 16. 
? Nuchal translucency test. This test uses ultrasound to measure the thickness of the area at the back of the baby's neck. An increase in the thickness can be an early sign of Down syndrome. ? Chorionic villus sampling (CVS). This is a test that looks for certain genetic problems with your baby. The same genes that are in your baby are in the placenta. A small piece of the placenta is taken out and tested. This test is done when you are 10 to 13 weeks pregnant. Ease discomfort · Slow down and take naps when you feel tired. · If your emotions swing, talk to someone. Crying, anxiety, and concentration problems are common. · If your gums bleed, try a softer toothbrush. If your gums are puffy and bleed a lot, see your dentist. 
· If you feel dizzy: ? Get up slowly after sitting or lying down. ? Drink plenty of fluids. ? Eat small snacks to keep your blood sugar stable. ? Put your head between your legs as though you were tying your shoelaces. ? Lie down with your legs higher than your head. Use pillows to prop up your feet. · If you have a headache: ? Lie down. ? Ask your partner or a good friend for a neck massage. ? Try cool cloths over your forehead or across the back of your neck. ? Use acetaminophen (Tylenol) for pain relief. Do not use nonsteroidal anti-inflammatory drugs (NSAIDs), such as ibuprofen (Advil, Motrin) or naproxen (Aleve), unless your doctor says it is okay. · If you have a nosebleed, pinch your nose gently, and hold it for a short while. To prevent nosebleeds, try massaging a small dab of petroleum jelly, such as Vaseline, in your nostrils. · If your nose is stuffed up, try saline (saltwater) nose sprays. Do not use decongestant sprays. Care for your breasts · Wear a bra that gives you good support. · Know that changes in your breasts are normal. 
? Your breasts may get larger and more tender. Tenderness usually gets better by 12 weeks. ? Your nipples may get darker and larger, and small bumps around your nipples may show more. ? The veins in your chest and breasts may show more. · Don't worry about \"toughening'\" your nipples. Breastfeeding will naturally do this. Where can you learn more? Go to http://valerie-quique.info/ Enter F947 in the search box to learn more about \"Weeks 10 to 14 of Your Pregnancy: Care Instructions. \" Current as of: May 29, 2019Content Version: 12.4 © 6002-9144 [employer]. Care instructions adapted under license by [employer] (which disclaims liability or warranty for this information). If you have questions about a medical condition or this instruction, always ask your healthcare professional. [address] any warranty or liability for your use of this information.

## 2020-08-11 ENCOUNTER — ROUTINE PRENATAL (OUTPATIENT)
Dept: OBGYN CLINIC | Age: 30
End: 2020-08-11
Payer: COMMERCIAL

## 2020-08-11 VITALS
DIASTOLIC BLOOD PRESSURE: 63 MMHG | SYSTOLIC BLOOD PRESSURE: 108 MMHG | HEIGHT: 61 IN | WEIGHT: 139 LBS | BODY MASS INDEX: 26.24 KG/M2

## 2020-08-11 DIAGNOSIS — Z34.80 SUPERVISION OF OTHER NORMAL PREGNANCY, ANTEPARTUM: Primary | ICD-10-CM

## 2020-08-11 LAB — AFP, MATERNAL, EXTERNAL: NEGATIVE

## 2020-08-11 PROCEDURE — 0502F SUBSEQUENT PRENATAL CARE: CPT | Performed by: OBSTETRICS & GYNECOLOGY

## 2020-08-11 NOTE — PROGRESS NOTES
Problem List  Date Reviewed: 7/10/2020          Codes Class Noted    Encounter for supervision of other normal pregnancy, first trimester ICD-10-CM: Z34.81  ICD-9-CM: V22.1  6/4/2020    Overview Addendum 7/17/2020  8:40 AM by Yanick Ramachandran LPN     EDC determined next visit  Hx of CS for breech  adderall  Horizon neg  NIPTS- normal male- Pt notified  AFP @ next visit

## 2020-08-11 NOTE — PATIENT INSTRUCTIONS

## 2020-08-14 LAB
AFP ADJ MOM SERPL: 1.75
AFP INTERP SERPL-IMP: NORMAL
AFP INTERP SERPL-IMP: NORMAL
AFP SERPL-MCNC: 64.8 NG/ML
AGE AT DELIVERY: 31.8 YR
COMMENT, 018013: NORMAL
GA METHOD: NORMAL
GA: 16.6 WEEKS
IDDM PATIENT QL: NO
MULTIPLE PREGNANCY: NO
NEURAL TUBE DEFECT RISK FETUS: 1442 %
RESULTS, 017004: NORMAL

## 2020-09-03 ENCOUNTER — ROUTINE PRENATAL (OUTPATIENT)
Dept: OBGYN CLINIC | Age: 30
End: 2020-09-03
Payer: COMMERCIAL

## 2020-09-03 VITALS
BODY MASS INDEX: 27.23 KG/M2 | WEIGHT: 144.2 LBS | SYSTOLIC BLOOD PRESSURE: 104 MMHG | DIASTOLIC BLOOD PRESSURE: 62 MMHG | HEIGHT: 61 IN

## 2020-09-03 DIAGNOSIS — Z34.80 SUPERVISION OF OTHER NORMAL PREGNANCY, ANTEPARTUM: Primary | ICD-10-CM

## 2020-09-03 PROCEDURE — 0502F SUBSEQUENT PRENATAL CARE: CPT | Performed by: OBSTETRICS & GYNECOLOGY

## 2020-09-03 PROCEDURE — 76805 OB US >/= 14 WKS SNGL FETUS: CPT | Performed by: OBSTETRICS & GYNECOLOGY

## 2020-09-03 NOTE — PATIENT INSTRUCTIONS

## 2020-09-03 NOTE — PROGRESS NOTES
Problem List  Date Reviewed: 8/11/2020          Codes Class Noted    Encounter for supervision of other normal pregnancy, first trimester ICD-10-CM: Z34.81  ICD-9-CM: V22.1  6/4/2020    Overview Addendum 8/17/2020  9:08 AM by Veronica Barth LPN     EDC determined next visit  Hx of CS for breech  adderall  Horizon neg  NIPTS- normal male- Pt notified  AFP- negative                 FETAL SURVEY  A SINGLE VIABLE IUP AT 20W1D GA BY LMP. FETAL CARDIAC MOTION OBSERVED. FETAL ANATOMY WELL VISUALIZED AND APPEARS WITHIN NORMAL LIMITS. NO ABNORMALITIES IDENTIFIED ON TODAYS EXAM.  APPROPRIATE GROWTH MEASURED; SIZE = DATES. MARTA, CERVIX AND PLACENTA APPEAR WITHIN NORMAL LIMITS.   GENDER: XY

## 2020-10-01 ENCOUNTER — ROUTINE PRENATAL (OUTPATIENT)
Dept: OBGYN CLINIC | Age: 30
End: 2020-10-01
Payer: COMMERCIAL

## 2020-10-01 VITALS
DIASTOLIC BLOOD PRESSURE: 73 MMHG | HEIGHT: 61 IN | BODY MASS INDEX: 27.38 KG/M2 | WEIGHT: 145 LBS | SYSTOLIC BLOOD PRESSURE: 122 MMHG

## 2020-10-01 DIAGNOSIS — Z23 ENCOUNTER FOR IMMUNIZATION: ICD-10-CM

## 2020-10-01 DIAGNOSIS — Z34.80 SUPERVISION OF OTHER NORMAL PREGNANCY: Primary | ICD-10-CM

## 2020-10-01 PROCEDURE — 90686 IIV4 VACC NO PRSV 0.5 ML IM: CPT

## 2020-10-01 PROCEDURE — 0502F SUBSEQUENT PRENATAL CARE: CPT

## 2020-10-01 PROCEDURE — 90471 IMMUNIZATION ADMIN: CPT

## 2020-10-01 NOTE — PATIENT INSTRUCTIONS
Weeks 22 to 26 of Your Pregnancy: Care Instructions Your Care Instructions As you enter your 7th month of pregnancy at week 26, your baby's lungs are growing stronger and getting ready to breathe. You may notice that your baby responds to the sound of your or your partner's voice. You may also notice that your baby does less turning and twisting and more squirming or jerking. Jerking often means that your baby has the hiccups. Hiccups are perfectly normal and are only temporary. You may want to think about attending a childbirth preparation class. This is also a good time to start thinking about whether you want to have pain medicine during labor. Most pregnant women are tested for gestational diabetes between weeks 25 and 28. Gestational diabetes occurs when your blood sugar level gets too high when you're pregnant. The test is important, because you can have gestational diabetes and not know it. But the condition can cause problems for your baby. Follow-up care is a key part of your treatment and safety. Be sure to make and go to all appointments, and call your doctor if you are having problems. It's also a good idea to know your test results and keep a list of the medicines you take. How can you care for yourself at home? Ease discomfort from your baby's kicking · Change your position. Sometimes this will cause your baby to change position too. · Take a deep breath while you raise your arm over your head. Then breathe out while you drop your arm. Do Kegel exercises to prevent urine from leaking · You can do Kegel exercises while you stand or sit. ? Squeeze the same muscles you would use to stop your urine. Your belly and thighs should not move. ? Hold the squeeze for 3 seconds, and then relax for 3 seconds. ? Start with 3 seconds. Then add 1 second each week until you are able to squeeze for 10 seconds. ? Repeat the exercise 10 to 15 times for each session.  Do three or more sessions each day. Ease or reduce swelling in your feet, ankles, hands, and fingers · If your fingers are puffy, take off your rings. · Do not eat high-salt foods, such as potato chips. · Prop up your feet on a stool or couch as much as possible. Sleep with pillows under your feet. · Do not stand for long periods of time or wear tight shoes. · Wear support stockings. Where can you learn more? Go to http://valerie-quique.info/ Enter G264 in the search box to learn more about \"Weeks 22 to 26 of Your Pregnancy: Care Instructions. \" Current as of: February 11, 2020               Content Version: 12.6 © 2950-0261 [employer]. Care instructions adapted under license by [employer] (which disclaims liability or warranty for this information). If you have questions about a medical condition or this instruction, always ask your healthcare professional. [address] any warranty or liability for your use of this information.

## 2020-10-01 NOTE — PROGRESS NOTES
Problem List  Date Reviewed: 9/3/2020          Codes Class Noted    Encounter for supervision of other normal pregnancy, first trimester ICD-10-CM: Z34.81  ICD-9-CM: V22.1  6/4/2020    Overview Addendum 8/17/2020  9:08 AM by Nasra Castle LPN     EDC determined next visit  Hx of CS for breech  adderall  Horizon neg  NIPTS- normal male- Pt notified  AFP- negative                 Administered 0.5mL FLU VACCINE per MD order. Patient consent signed by patient. Injection given IM in the LEFT DELTOID . Patient tolerated well, no complications, no side effects.

## 2020-10-29 ENCOUNTER — ROUTINE PRENATAL (OUTPATIENT)
Dept: OBGYN CLINIC | Age: 30
End: 2020-10-29
Payer: COMMERCIAL

## 2020-10-29 VITALS
DIASTOLIC BLOOD PRESSURE: 69 MMHG | HEIGHT: 61 IN | WEIGHT: 160.8 LBS | SYSTOLIC BLOOD PRESSURE: 119 MMHG | BODY MASS INDEX: 30.36 KG/M2

## 2020-10-29 DIAGNOSIS — Z34.80 SUPERVISION OF OTHER NORMAL PREGNANCY: Primary | ICD-10-CM

## 2020-10-29 LAB
ERYTHROCYTE [DISTWIDTH] IN BLOOD BY AUTOMATED COUNT: 13.2 % (ref 11.5–14.5)
GLUCOSE 1H P 100 G GLC PO SERPL-MCNC: 76 MG/DL (ref 65–140)
HCT VFR BLD AUTO: 35.2 % (ref 35–47)
HGB BLD-MCNC: 11.6 G/DL (ref 11.5–16)
MCH RBC QN AUTO: 32 PG (ref 26–34)
MCHC RBC AUTO-ENTMCNC: 33 G/DL (ref 30–36.5)
MCV RBC AUTO: 97 FL (ref 80–99)
NRBC # BLD: 0 K/UL (ref 0–0.01)
NRBC BLD-RTO: 0 PER 100 WBC
PLATELET # BLD AUTO: 127 K/UL (ref 150–400)
PMV BLD AUTO: 11.5 FL (ref 8.9–12.9)
RBC # BLD AUTO: 3.63 M/UL (ref 3.8–5.2)
WBC # BLD AUTO: 9.6 K/UL (ref 3.6–11)

## 2020-10-29 PROCEDURE — 90715 TDAP VACCINE 7 YRS/> IM: CPT | Performed by: OBSTETRICS & GYNECOLOGY

## 2020-10-29 PROCEDURE — 90471 IMMUNIZATION ADMIN: CPT | Performed by: OBSTETRICS & GYNECOLOGY

## 2020-10-29 PROCEDURE — 0502F SUBSEQUENT PRENATAL CARE: CPT | Performed by: OBSTETRICS & GYNECOLOGY

## 2020-10-29 NOTE — PROGRESS NOTES
Baby moving  glucola and tdap today  Plans  - disc risk of rupture 1%, could be catastrophic to baby

## 2020-10-29 NOTE — PATIENT INSTRUCTIONS

## 2020-10-29 NOTE — PROGRESS NOTES
Problem List  Date Reviewed: 10/1/2020          Codes Class Noted    Encounter for supervision of other normal pregnancy, first trimester ICD-10-CM: Z34.81  ICD-9-CM: V22.1  6/4/2020    Overview Addendum 10/1/2020  8:48 AM by Vishnu Pride     EDC determined next visit  Hx of CS for breech  adderall  Horizon neg  NIPTS- normal male- Pt notified  AFP- negative  FLU VACCINE 10/01/2020

## 2020-11-16 ENCOUNTER — ROUTINE PRENATAL (OUTPATIENT)
Dept: OBGYN CLINIC | Age: 30
End: 2020-11-16
Payer: COMMERCIAL

## 2020-11-16 VITALS
DIASTOLIC BLOOD PRESSURE: 77 MMHG | HEIGHT: 61 IN | SYSTOLIC BLOOD PRESSURE: 119 MMHG | WEIGHT: 163.2 LBS | BODY MASS INDEX: 30.81 KG/M2

## 2020-11-16 DIAGNOSIS — Z34.80 SUPERVISION OF OTHER NORMAL PREGNANCY: Primary | ICD-10-CM

## 2020-11-16 PROCEDURE — 0502F SUBSEQUENT PRENATAL CARE: CPT | Performed by: OBSTETRICS & GYNECOLOGY

## 2020-11-16 NOTE — PATIENT INSTRUCTIONS

## 2020-11-16 NOTE — PROGRESS NOTES
Problem List  Date Reviewed: 10/29/2020          Codes Class Noted    Encounter for supervision of other normal pregnancy, first trimester ICD-10-CM: Z34.81  ICD-9-CM: V22.1  6/4/2020    Overview Addendum 11/3/2020  2:31 PM by Hubert Rangel, SHANTALN     EDC determined next visit  Hx of CS for breech  adderall  Horizon neg  NIPTS- normal male- Pt notified  AFP- negative  Flu vaccine 10/01/2020  plt 127K at 28 weeks - recheck at 34 weeks

## 2020-12-01 ENCOUNTER — ROUTINE PRENATAL (OUTPATIENT)
Dept: OBGYN CLINIC | Age: 30
End: 2020-12-01
Payer: COMMERCIAL

## 2020-12-01 VITALS — WEIGHT: 164 LBS | BODY MASS INDEX: 30.99 KG/M2 | DIASTOLIC BLOOD PRESSURE: 79 MMHG | SYSTOLIC BLOOD PRESSURE: 136 MMHG

## 2020-12-01 DIAGNOSIS — Z34.80 SUPERVISION OF OTHER NORMAL PREGNANCY: Primary | ICD-10-CM

## 2020-12-01 PROCEDURE — 0502F SUBSEQUENT PRENATAL CARE: CPT | Performed by: OBSTETRICS & GYNECOLOGY

## 2020-12-01 NOTE — PROGRESS NOTES
Problem List  Date Reviewed: 11/16/2020          Codes Class Noted    Encounter for supervision of other normal pregnancy, first trimester ICD-10-CM: Z34.81  ICD-9-CM: V22.1  6/4/2020    Overview Addendum 11/3/2020  2:31 PM by Nat Rodrigez LPN     EDC determined next visit  Hx of CS for breech  adderall  Horizon neg  NIPTS- normal male- Pt notified  AFP- negative  Flu vaccine 10/01/2020  plt 127K at 28 weeks - recheck at 34 weeks

## 2020-12-09 ENCOUNTER — ROUTINE PRENATAL (OUTPATIENT)
Dept: OBGYN CLINIC | Age: 30
End: 2020-12-09
Payer: COMMERCIAL

## 2020-12-09 VITALS — BODY MASS INDEX: 31.52 KG/M2 | WEIGHT: 166.8 LBS | DIASTOLIC BLOOD PRESSURE: 73 MMHG | SYSTOLIC BLOOD PRESSURE: 118 MMHG

## 2020-12-09 DIAGNOSIS — Z34.80 SUPERVISION OF OTHER NORMAL PREGNANCY: Primary | ICD-10-CM

## 2020-12-09 PROCEDURE — 0502F SUBSEQUENT PRENATAL CARE: CPT | Performed by: OBSTETRICS & GYNECOLOGY

## 2020-12-09 NOTE — PROGRESS NOTES
US 50%tile, nl AFV, vtx  Baby moving  GBS in 2 weeks    Disc IOL - 2-3% risk of rupture.  Will post CS for 1/20 and can change if she desires

## 2020-12-09 NOTE — PATIENT INSTRUCTIONS

## 2020-12-09 NOTE — PROGRESS NOTES
LIMITED OB SCAN  A SINGLE VERTEX 34W0D IUP IS SEEN. FETAL CARDIAC MOTION OBSERVED. LIMITED ANATOMY WAS VISUALIZED AND APPEARS WNL. APPROPRIATE FETAL GROWTH IS SEEN. SIZE=DATES. MARTA AND PLACENTA APPEAR WITHIN NORMAL LIMITS.     Problem List  Date Reviewed: 12/1/2020          Codes Class Noted    Encounter for supervision of other normal pregnancy, first trimester ICD-10-CM: Z34.81  ICD-9-CM: V22.1  6/4/2020    Overview Addendum 12/1/2020  1:41 PM by Gerard Tyler LPN     EDC determined next visit  Hx of CS for breech  adderall  Horizon neg  NIPTS- normal male- Pt notified  AFP- negative  Flu vaccine 10/01/2020  plt 127K at 28 weeks - recheck at 34 weeks

## 2020-12-22 ENCOUNTER — ROUTINE PRENATAL (OUTPATIENT)
Dept: OBGYN CLINIC | Age: 30
End: 2020-12-22
Payer: COMMERCIAL

## 2020-12-22 VITALS
WEIGHT: 172.4 LBS | DIASTOLIC BLOOD PRESSURE: 77 MMHG | HEIGHT: 61 IN | BODY MASS INDEX: 32.55 KG/M2 | SYSTOLIC BLOOD PRESSURE: 124 MMHG

## 2020-12-22 DIAGNOSIS — Z34.80 SUPERVISION OF OTHER NORMAL PREGNANCY: Primary | ICD-10-CM

## 2020-12-22 LAB
ERYTHROCYTE [DISTWIDTH] IN BLOOD BY AUTOMATED COUNT: 12.7 % (ref 11.5–14.5)
GRBS, EXTERNAL: NEGATIVE
HCT VFR BLD AUTO: 35.1 % (ref 35–47)
HGB BLD-MCNC: 11.4 G/DL (ref 11.5–16)
MCH RBC QN AUTO: 31.3 PG (ref 26–34)
MCHC RBC AUTO-ENTMCNC: 32.5 G/DL (ref 30–36.5)
MCV RBC AUTO: 96.4 FL (ref 80–99)
NRBC # BLD: 0 K/UL (ref 0–0.01)
NRBC BLD-RTO: 0 PER 100 WBC
PLATELET # BLD AUTO: 98 K/UL (ref 150–400)
PMV BLD AUTO: 12.8 FL (ref 8.9–12.9)
RBC # BLD AUTO: 3.64 M/UL (ref 3.8–5.2)
WBC # BLD AUTO: 9.4 K/UL (ref 3.6–11)

## 2020-12-22 PROCEDURE — 0502F SUBSEQUENT PRENATAL CARE: CPT | Performed by: OBSTETRICS & GYNECOLOGY

## 2020-12-22 NOTE — PATIENT INSTRUCTIONS

## 2020-12-22 NOTE — PROGRESS NOTES
Problem List  Date Reviewed: 12/9/2020          Codes Class Noted    Encounter for supervision of other normal pregnancy, first trimester ICD-10-CM: Z34.81  ICD-9-CM: V22.1  6/4/2020    Overview Addendum 12/9/2020 10:23 AM by Diaz Woodward     EDC determined next visit  Hx of CS for breech  adderall  Horizon neg  NIPTS- normal male- Pt notified  AFP- negative  Flu vaccine 10/01/2020  plt 127K at 28 weeks - recheck at 34 weeks  Repeat C/S 1/20/21. Pt notified.

## 2020-12-25 LAB
BACTERIA SPEC CULT: NORMAL
SERVICE CMNT-IMP: NORMAL

## 2020-12-30 ENCOUNTER — ROUTINE PRENATAL (OUTPATIENT)
Dept: OBGYN CLINIC | Age: 30
End: 2020-12-30
Payer: COMMERCIAL

## 2020-12-30 VITALS
WEIGHT: 172.4 LBS | DIASTOLIC BLOOD PRESSURE: 72 MMHG | HEIGHT: 61 IN | SYSTOLIC BLOOD PRESSURE: 119 MMHG | BODY MASS INDEX: 32.55 KG/M2

## 2020-12-30 DIAGNOSIS — D69.6 THROMBOCYTOPENIA AFFECTING PREGNANCY (HCC): ICD-10-CM

## 2020-12-30 DIAGNOSIS — O99.119 THROMBOCYTOPENIA AFFECTING PREGNANCY (HCC): ICD-10-CM

## 2020-12-30 DIAGNOSIS — Z34.80 SUPERVISION OF OTHER NORMAL PREGNANCY: Primary | ICD-10-CM

## 2020-12-30 LAB
ERYTHROCYTE [DISTWIDTH] IN BLOOD BY AUTOMATED COUNT: 13.1 % (ref 11.5–14.5)
HCT VFR BLD AUTO: 36.9 % (ref 35–47)
HGB BLD-MCNC: 11.9 G/DL (ref 11.5–16)
MCH RBC QN AUTO: 30.8 PG (ref 26–34)
MCHC RBC AUTO-ENTMCNC: 32.2 G/DL (ref 30–36.5)
MCV RBC AUTO: 95.6 FL (ref 80–99)
NRBC # BLD: 0 K/UL (ref 0–0.01)
NRBC BLD-RTO: 0 PER 100 WBC
PLATELET # BLD AUTO: 116 K/UL (ref 150–400)
PMV BLD AUTO: 12.4 FL (ref 8.9–12.9)
RBC # BLD AUTO: 3.86 M/UL (ref 3.8–5.2)
WBC # BLD AUTO: 10.9 K/UL (ref 3.6–11)

## 2020-12-30 PROCEDURE — 0502F SUBSEQUENT PRENATAL CARE: CPT | Performed by: OBSTETRICS & GYNECOLOGY

## 2020-12-30 NOTE — PROGRESS NOTES
Problem List  Date Reviewed: 12/22/2020          Codes Class Noted    Encounter for supervision of other normal pregnancy, first trimester ICD-10-CM: Z34.81  ICD-9-CM: V22.1  6/4/2020    Overview Addendum 12/29/2020 10:21 AM by Juanis Ferreira     EDC determined next visit  Hx of CS for breech  adderall  Horizon neg  NIPTS- normal male- Pt notified  AFP- negative  Flu vaccine 10/01/2020  plt 127K at 28 weeks - 34 weeks plts 98K - repeat next visit  Repeat C/S 1/20/21. Pt notified.   GBS neg  plts 98K - repeat next visit

## 2020-12-30 NOTE — PROGRESS NOTES
Plts 98K  Baby moving  Cervix unchanged  CS is posted for 40 weeks  Will likely need steroids - recheck today

## 2020-12-30 NOTE — PATIENT INSTRUCTIONS

## 2021-01-08 ENCOUNTER — HOSPITAL ENCOUNTER (EMERGENCY)
Age: 31
Discharge: HOME | End: 2021-01-08
Attending: OBSTETRICS & GYNECOLOGY | Admitting: OBSTETRICS & GYNECOLOGY
Payer: COMMERCIAL

## 2021-01-08 ENCOUNTER — ROUTINE PRENATAL (OUTPATIENT)
Dept: OBGYN CLINIC | Age: 31
End: 2021-01-08
Payer: COMMERCIAL

## 2021-01-08 VITALS
DIASTOLIC BLOOD PRESSURE: 80 MMHG | HEIGHT: 61 IN | BODY MASS INDEX: 33.04 KG/M2 | SYSTOLIC BLOOD PRESSURE: 120 MMHG | WEIGHT: 175 LBS | HEART RATE: 94 BPM

## 2021-01-08 VITALS
HEART RATE: 96 BPM | SYSTOLIC BLOOD PRESSURE: 126 MMHG | DIASTOLIC BLOOD PRESSURE: 79 MMHG | TEMPERATURE: 98.2 F | RESPIRATION RATE: 16 BRPM

## 2021-01-08 DIAGNOSIS — O36.8190 DECREASED FETAL MOVEMENT DURING PREGNANCY, ANTEPARTUM, SINGLE OR UNSPECIFIED FETUS: ICD-10-CM

## 2021-01-08 DIAGNOSIS — O99.119 THROMBOCYTOPENIA AFFECTING PREGNANCY (HCC): ICD-10-CM

## 2021-01-08 DIAGNOSIS — D69.6 THROMBOCYTOPENIA AFFECTING PREGNANCY (HCC): ICD-10-CM

## 2021-01-08 DIAGNOSIS — Z3A.38 38 WEEKS GESTATION OF PREGNANCY: ICD-10-CM

## 2021-01-08 DIAGNOSIS — Z34.81 ENCOUNTER FOR SUPERVISION OF OTHER NORMAL PREGNANCY, FIRST TRIMESTER: Primary | ICD-10-CM

## 2021-01-08 LAB
BASOPHILS # BLD: 0 K/UL (ref 0–0.1)
BASOPHILS NFR BLD: 0 % (ref 0–1)
DIFFERENTIAL METHOD BLD: ABNORMAL
EOSINOPHIL # BLD: 0 K/UL (ref 0–0.4)
EOSINOPHIL NFR BLD: 0 % (ref 0–7)
ERYTHROCYTE [DISTWIDTH] IN BLOOD BY AUTOMATED COUNT: 12.9 % (ref 11.5–14.5)
HCT VFR BLD AUTO: 36.8 % (ref 35–47)
HGB BLD-MCNC: 12.1 G/DL (ref 11.5–16)
IMM GRANULOCYTES # BLD AUTO: 0.1 K/UL (ref 0–0.04)
IMM GRANULOCYTES NFR BLD AUTO: 1 % (ref 0–0.5)
LYMPHOCYTES # BLD: 3.3 K/UL (ref 0.8–3.5)
LYMPHOCYTES NFR BLD: 27 % (ref 12–49)
MCH RBC QN AUTO: 30.6 PG (ref 26–34)
MCHC RBC AUTO-ENTMCNC: 32.9 G/DL (ref 30–36.5)
MCV RBC AUTO: 93.2 FL (ref 80–99)
MONOCYTES # BLD: 0.9 K/UL (ref 0–1)
MONOCYTES NFR BLD: 7 % (ref 5–13)
NEUTS SEG # BLD: 8.1 K/UL (ref 1.8–8)
NEUTS SEG NFR BLD: 65 % (ref 32–75)
NRBC # BLD: 0 K/UL (ref 0–0.01)
NRBC BLD-RTO: 0 PER 100 WBC
PLATELET # BLD AUTO: 111 K/UL (ref 150–400)
PMV BLD AUTO: 12.6 FL (ref 8.9–12.9)
RBC # BLD AUTO: 3.95 M/UL (ref 3.8–5.2)
WBC # BLD AUTO: 12.4 K/UL (ref 3.6–11)

## 2021-01-08 PROCEDURE — 36415 COLL VENOUS BLD VENIPUNCTURE: CPT

## 2021-01-08 PROCEDURE — 99282 EMERGENCY DEPT VISIT SF MDM: CPT

## 2021-01-08 PROCEDURE — 74011000258 HC RX REV CODE- 258: Performed by: OBSTETRICS & GYNECOLOGY

## 2021-01-08 PROCEDURE — 85025 COMPLETE CBC W/AUTO DIFF WBC: CPT

## 2021-01-08 PROCEDURE — 76818 FETAL BIOPHYS PROFILE W/NST: CPT | Performed by: OBSTETRICS & GYNECOLOGY

## 2021-01-08 PROCEDURE — 0502F SUBSEQUENT PRENATAL CARE: CPT | Performed by: OBSTETRICS & GYNECOLOGY

## 2021-01-08 PROCEDURE — 59025 FETAL NON-STRESS TEST: CPT

## 2021-01-08 PROCEDURE — 74011250636 HC RX REV CODE- 250/636: Performed by: OBSTETRICS & GYNECOLOGY

## 2021-01-08 RX ORDER — SODIUM CHLORIDE, SODIUM LACTATE, POTASSIUM CHLORIDE, CALCIUM CHLORIDE 600; 310; 30; 20 MG/100ML; MG/100ML; MG/100ML; MG/100ML
125 INJECTION, SOLUTION INTRAVENOUS CONTINUOUS
Status: DISCONTINUED | OUTPATIENT
Start: 2021-01-08 | End: 2021-01-08 | Stop reason: HOSPADM

## 2021-01-08 RX ORDER — DEXTROSE MONOHYDRATE AND SODIUM CHLORIDE 5; .45 G/100ML; G/100ML
250 INJECTION, SOLUTION INTRAVENOUS ONCE
Status: COMPLETED | OUTPATIENT
Start: 2021-01-08 | End: 2021-01-08

## 2021-01-08 RX ADMIN — DEXTROSE MONOHYDRATE AND SODIUM CHLORIDE 250 ML: 5; .45 INJECTION, SOLUTION INTRAVENOUS at 10:21

## 2021-01-08 RX ADMIN — SODIUM CHLORIDE, POTASSIUM CHLORIDE, SODIUM LACTATE AND CALCIUM CHLORIDE 125 ML/HR: 600; 310; 30; 20 INJECTION, SOLUTION INTRAVENOUS at 10:42

## 2021-01-08 NOTE — DISCHARGE INSTRUCTIONS
Patient Education   Patient Education        Counting Your Baby's Kicks: Care Instructions  Your Care Instructions     Counting your baby's kicks is one way your doctor can tell that your baby is healthy. Most women--especially in a first pregnancy--feel their baby move for the first time between 16 and 22 weeks. The movement may feel like flutters rather than kicks. Your baby may move more at certain times of the day. When you are active, you may notice less kicking than when you are resting. At your prenatal visits, your doctor will ask whether the baby is active. In your last trimester, your doctor may ask you to count the number of times you feel your baby move. Follow-up care is a key part of your treatment and safety. Be sure to make and go to all appointments, and call your doctor if you are having problems. It's also a good idea to know your test results and keep a list of the medicines you take. How do you count fetal kicks? · A common method of checking your baby's movement is to count the number of kicks or moves you feel in 1 hour. Ten movements (such as kicks, flutters, or rolls) in 1 hour are normal. Some doctors suggest that you count in the morning until you get to 10 movements. Then you can quit for that day and start again the next day. · Pick your baby's most active time of day to count. This may be any time from morning to evening. · If you do not feel 10 movements in an hour, your baby may be sleeping. Wait for the next hour and count again. When should you call for help? Call your doctor now or seek immediate medical care if:    · You noticed that your baby has stopped moving or is moving much less than normal.   Watch closely for changes in your health, and be sure to contact your doctor if you have any problems. Where can you learn more?   Go to http://www.gray.com/  Enter [id number] in the search box to learn more about \"Counting Your Baby's Kicks: Care Instructions. \"  Current as of: February 11, 2020               Content Version: 12.6  © 9863-4942 [employer]. Care instructions adapted under license by [employer] (which disclaims liability or warranty for this information). If you have questions about a medical condition or this instruction, always ask your healthcare professional. [address] any warranty or liability for your use of this information. Early Stage of Labor at Home: Care Instructions  Your Care Instructions     If you came to the hospital while in early labor, your doctor may have asked if you want to labor at home until your contractions are stronger. Many women stay at home during early labor. This is often the longest part of the birthing process. It may last up to 2 to 3 days. Contractions are mild to moderate and shorter (about 30 to 45 seconds). You can usually keep talking during them. Contractions may also be irregular, about 5 to 20 minutes apart. They may even stop for a while. It helps to stay as relaxed as you can during this time. You can spend some or all of your early labor at home or anywhere else you may be comfortable. If you live far from the hospital or birthing center, you may want to think about going somewhere nearby so you can get back to the hospital quickly. For some women, there may be benefits to staying home during early labor, such as avoiding medicines or procedures. As labor progresses, you'll shift from early labor to active labor. During this time, contractions get more intense. They occur more often, about every 2 to 3 minutes. They also last longer, about 50 to 70 seconds. You will feel them even when you change positions and walk or move around. It may be hard to tell if you are in active labor. If you aren't sure, call your doctor or midwife.  As your labor progresses, check in with your doctor or midwife about when to come back to the hospital or birthing center. You may have special instructions if your water broke or you tested positive for group B strep. Follow-up care is a key part of your treatment and safety. Be sure to make and go to all appointments, and call your doctor if you are having problems. It's also a good idea to know your test results and keep a list of the medicines you take. How can you care for yourself at home? · Get support. Having a support person with you from early labor until after childbirth can have a positive effect on childbirth. · Find distractions. During early labor, you can walk, play cards, watch TV, or listen to music to help take your mind off your contractions. · Ask your partner, labor , or  for a massage. Shoulder and low back massage during contractions may ease your pain. Strong massage of the back muscles (counterpressure) during contractions may help relieve the pain of back labor. Tell your labor  exactly where to push and how hard to push. · Use imagery. This means using your imagination to decrease your pain. For instance, to help manage pain, picture your contractions as waves rolling over you. Picture a peaceful place, such as a beach or mountain stream, to help you relax between contractions. · Change positions during labor. Walking, kneeling, or sitting on a big rubber ball (birth ball) are good options. · Use focused breathing techniques. Breathing in a rhythm can distract you from pain. · Take a warm shower or bath. Warm water may ease pain and stress. When should you call for help? Call 911 anytime you think you may need emergency care. For example, call if:    · You passed out (lost consciousness).     · You have a seizure.     · You have severe vaginal bleeding.     · You have severe pain in your belly or pelvis.     · You have had fluid gushing or leaking from your vagina and you know or think the umbilical cord is bulging into your vagina.  If this happens, immediately get down on your knees so your rear end (buttocks) is higher than your head. This will decrease the pressure on the cord until help arrives. Call your doctor now or seek immediate medical care if:    · You have new or worse signs of preeclampsia, such as:  ? Sudden swelling of your face, hands, or feet. ? New vision problems (such as dimness, blurring, or seeing spots). ? A severe headache.     · You have any vaginal bleeding.     · You have belly pain or cramping.     · You have a fever.     · You have had regular contractions (with or without pain) for an hour. This means that you have 8 or more within 1 hour or 4 or more in 20 minutes after you change your position and drink fluids.     · You have a sudden release of fluid from your vagina.     · You have low back pain or pelvic pressure that does not go away.     · You notice that your baby has stopped moving or is moving much less than normal.   Watch closely for changes in your health, and be sure to contact your doctor if you have any problems. Where can you learn more? Go to http://www.gray.com/  Enter [id number] in the search box to learn more about \"Early Stage of Labor at Home: Care Instructions. \"  Current as of: February 11, 2020               Content Version: 12.6  © 4232-2588 [employer]. Care instructions adapted under license by [employer] (which disclaims liability or warranty for this information). If you have questions about a medical condition or this instruction, always ask your healthcare professional. [place] [address] any warranty or liability for your use of this information.

## 2021-01-08 NOTE — PATIENT INSTRUCTIONS

## 2021-01-08 NOTE — PROGRESS NOTES
NST procedure note    Basim Delgadillo is a ,  31 y.o. female [facility] whose LMP  was on  who presents for fetal non-stress test.    She is 38w2d and was monitored for 20 minutes and the FHR was reactive. NST Interpretation:    FHR baseline 150 bpm, variability moderate, accelerations present, decelerations Absent. Uterine contractions were absent. Josephine Sanchez was informed of the NST results and her questions were answered.     Disposition:    - return to clinic as scheduled   - send to L&D for prolonged monitoring

## 2021-01-08 NOTE — PROGRESS NOTES
1015-Pt arrives to L&D from Dr. Mohan Nobles office for decreased fetal movement since yesterday    1040-Pt reports feeling some fetal movement    1050-Called report to Dr. Leidy Almazan, pt reports feeling some fetal movement, fetal movement audible on monitor, FHR reactive, pt received D5 IV bolus and is now on LR @ 125 ml/hr, platelet count is 249. Pt requesting to see MD at some point. MD orders to continue monitoring pt for two hours and MD will come down to see pt in a little while. No further orders received. 1215-RN at bedside to check on patient, pt denies needing anything and reports feeling a little more fetal movement. Pt asks how long will she need to be monitored for. RN states that Dr. Leidy Almazan wanted pt monitored for two hours, pt verbalizes understanding.     1340-Kasie Raphael at bedside, discusses plan to discharge home and follow up next week, discussed kick counts and not to hesitate to call with concerns, pt verbalized understanding    1350-Discussed discharge instructions with patient, verbalized understanding, denied questions    1352-Pt and  ambulated off unit in stable condition

## 2021-01-08 NOTE — PROGRESS NOTES
Feels like baby moving less.  Okay last night but she has started needing to do [facility]  NST reactive  AFV normal  BPP breathing present, no movement but tech only looked for <15 minutes  Will send to L&D for extended monitoring

## 2021-01-08 NOTE — PROGRESS NOTES
Problem List  Date Reviewed: 12/30/2020          Codes Class Noted    Encounter for supervision of other normal pregnancy, first trimester ICD-10-CM: Z34.81  ICD-9-CM: V22.1  6/4/2020    Overview Addendum 12/29/2020 10:21 AM by Lillian Bradley     EDC determined next visit  Hx of CS for breech  adderall  Horizon neg  NIPTS- normal male- Pt notified  AFP- negative  Flu vaccine 10/01/2020  plt 127K at 28 weeks - 34 weeks plts 98K - repeat next visit  Repeat C/S 1/20/21. Pt notified.   GBS neg  plts 98K - repeat next visit

## 2021-01-13 ENCOUNTER — TRANSCRIBE ORDER (OUTPATIENT)
Dept: REGISTRATION | Age: 31
End: 2021-01-13

## 2021-01-13 ENCOUNTER — ROUTINE PRENATAL (OUTPATIENT)
Dept: OBGYN CLINIC | Age: 31
End: 2021-01-13
Payer: COMMERCIAL

## 2021-01-13 ENCOUNTER — HOSPITAL ENCOUNTER (OUTPATIENT)
Dept: LAB | Age: 31
Discharge: HOME | End: 2021-01-13
Payer: COMMERCIAL

## 2021-01-13 VITALS — BODY MASS INDEX: 33.25 KG/M2 | WEIGHT: 176 LBS | SYSTOLIC BLOOD PRESSURE: 129 MMHG | DIASTOLIC BLOOD PRESSURE: 83 MMHG

## 2021-01-13 DIAGNOSIS — D69.6 THROMBOCYTOPENIA AFFECTING PREGNANCY (HCC): ICD-10-CM

## 2021-01-13 DIAGNOSIS — O99.119 THROMBOCYTOPENIA AFFECTING PREGNANCY (HCC): ICD-10-CM

## 2021-01-13 DIAGNOSIS — Z01.812 PRE-PROCEDURAL LABORATORY EXAMINATIONS: Primary | ICD-10-CM

## 2021-01-13 DIAGNOSIS — Z3A.39 39 WEEKS GESTATION OF PREGNANCY: ICD-10-CM

## 2021-01-13 DIAGNOSIS — Z34.80 SUPERVISION OF OTHER NORMAL PREGNANCY: Primary | ICD-10-CM

## 2021-01-13 DIAGNOSIS — Z01.812 PRE-PROCEDURAL LABORATORY EXAMINATIONS: ICD-10-CM

## 2021-01-13 LAB
ERYTHROCYTE [DISTWIDTH] IN BLOOD BY AUTOMATED COUNT: 13.7 % (ref 11.5–14.5)
HCT VFR BLD AUTO: 36.5 % (ref 35–47)
HGB BLD-MCNC: 11.9 G/DL (ref 11.5–16)
MCH RBC QN AUTO: 30.8 PG (ref 26–34)
MCHC RBC AUTO-ENTMCNC: 32.6 G/DL (ref 30–36.5)
MCV RBC AUTO: 94.6 FL (ref 80–99)
NRBC # BLD: 0 K/UL (ref 0–0.01)
NRBC BLD-RTO: 0 PER 100 WBC
PLATELET # BLD AUTO: 107 K/UL (ref 150–400)
RBC # BLD AUTO: 3.86 M/UL (ref 3.8–5.2)
WBC # BLD AUTO: 10.5 K/UL (ref 3.6–11)

## 2021-01-13 PROCEDURE — 87635 SARS-COV-2 COVID-19 AMP PRB: CPT

## 2021-01-13 PROCEDURE — 0502F SUBSEQUENT PRENATAL CARE: CPT | Performed by: OBSTETRICS & GYNECOLOGY

## 2021-01-13 NOTE — PROGRESS NOTES
Problem List  Date Reviewed: 1/8/2021          Codes Class Noted    Encounter for supervision of other normal pregnancy, first trimester ICD-10-CM: Z34.81  ICD-9-CM: V22.1  6/4/2020    Overview Addendum 12/29/2020 10:21 AM by Cait Ramsay     EDC determined next visit  Hx of CS for breech  adderall  Horizon neg  NIPTS- normal male- Pt notified  AFP- negative  Flu vaccine 10/01/2020  plt 127K at 28 weeks - 34 weeks plts 98K - repeat next visit  Repeat C/S 1/20/21. Pt notified.   GBS neg  plts 98K - repeat next visit

## 2021-01-14 LAB — SARS-COV-2, COV2NT: NOT DETECTED

## 2021-01-18 ENCOUNTER — ANESTHESIA (OUTPATIENT)
Dept: LABOR AND DELIVERY | Age: 31
End: 2021-01-18
Payer: COMMERCIAL

## 2021-01-18 ENCOUNTER — ANESTHESIA EVENT (OUTPATIENT)
Dept: LABOR AND DELIVERY | Age: 31
End: 2021-01-18
Payer: COMMERCIAL

## 2021-01-18 ENCOUNTER — TELEPHONE (OUTPATIENT)
Dept: OBGYN CLINIC | Age: 31
End: 2021-01-18

## 2021-01-18 ENCOUNTER — ROUTINE PRENATAL (OUTPATIENT)
Dept: OBGYN CLINIC | Age: 31
End: 2021-01-18
Payer: COMMERCIAL

## 2021-01-18 ENCOUNTER — HOSPITAL ENCOUNTER (INPATIENT)
Age: 31
LOS: 2 days | Discharge: HOME | End: 2021-01-20
Attending: OBSTETRICS & GYNECOLOGY | Admitting: OBSTETRICS & GYNECOLOGY
Payer: COMMERCIAL

## 2021-01-18 VITALS — SYSTOLIC BLOOD PRESSURE: 135 MMHG | BODY MASS INDEX: 33.07 KG/M2 | DIASTOLIC BLOOD PRESSURE: 91 MMHG | WEIGHT: 175 LBS

## 2021-01-18 DIAGNOSIS — Z34.80 SUPERVISION OF OTHER NORMAL PREGNANCY: Primary | ICD-10-CM

## 2021-01-18 PROBLEM — Z34.90 PREGNANCY: Status: ACTIVE | Noted: 2021-01-18

## 2021-01-18 LAB
BASOPHILS # BLD: 0 K/UL (ref 0–0.1)
BASOPHILS NFR BLD: 0 % (ref 0–1)
DIFFERENTIAL METHOD BLD: ABNORMAL
EOSINOPHIL # BLD: 0 K/UL (ref 0–0.4)
EOSINOPHIL NFR BLD: 0 % (ref 0–7)
ERYTHROCYTE [DISTWIDTH] IN BLOOD BY AUTOMATED COUNT: 13.3 % (ref 11.5–14.5)
HCT VFR BLD AUTO: 38.2 % (ref 35–47)
HGB BLD-MCNC: 12.8 G/DL (ref 11.5–16)
IMM GRANULOCYTES # BLD AUTO: 0.1 K/UL (ref 0–0.04)
IMM GRANULOCYTES NFR BLD AUTO: 1 % (ref 0–0.5)
LYMPHOCYTES # BLD: 2.5 K/UL (ref 0.8–3.5)
LYMPHOCYTES NFR BLD: 20 % (ref 12–49)
MCH RBC QN AUTO: 30.5 PG (ref 26–34)
MCHC RBC AUTO-ENTMCNC: 33.5 G/DL (ref 30–36.5)
MCV RBC AUTO: 91.2 FL (ref 80–99)
MONOCYTES # BLD: 0.9 K/UL (ref 0–1)
MONOCYTES NFR BLD: 7 % (ref 5–13)
NEUTS SEG # BLD: 9.1 K/UL (ref 1.8–8)
NEUTS SEG NFR BLD: 72 % (ref 32–75)
NRBC # BLD: 0.02 K/UL (ref 0–0.01)
NRBC BLD-RTO: 0.2 PER 100 WBC
PLATELET # BLD AUTO: 117 K/UL (ref 150–400)
RBC # BLD AUTO: 4.19 M/UL (ref 3.8–5.2)
WBC # BLD AUTO: 12.7 K/UL (ref 3.6–11)

## 2021-01-18 PROCEDURE — 74011000250 HC RX REV CODE- 250: Performed by: ANESTHESIOLOGY

## 2021-01-18 PROCEDURE — 74011250636 HC RX REV CODE- 250/636: Performed by: OBSTETRICS & GYNECOLOGY

## 2021-01-18 PROCEDURE — 75410000002 HC LABOR FEE PER 1 HR: Performed by: OBSTETRICS & GYNECOLOGY

## 2021-01-18 PROCEDURE — 36415 COLL VENOUS BLD VENIPUNCTURE: CPT

## 2021-01-18 PROCEDURE — 00HU33Z INSERTION OF INFUSION DEVICE INTO SPINAL CANAL, PERCUTANEOUS APPROACH: ICD-10-PCS | Performed by: ANESTHESIOLOGY

## 2021-01-18 PROCEDURE — 0502F SUBSEQUENT PRENATAL CARE: CPT | Performed by: OBSTETRICS & GYNECOLOGY

## 2021-01-18 PROCEDURE — 76060000078 HC EPIDURAL ANESTHESIA: Performed by: ANESTHESIOLOGY

## 2021-01-18 PROCEDURE — 74011250637 HC RX REV CODE- 250/637: Performed by: OBSTETRICS & GYNECOLOGY

## 2021-01-18 PROCEDURE — 75410000003 HC RECOV DEL/VAG/CSECN EA 0.5 HR: Performed by: OBSTETRICS & GYNECOLOGY

## 2021-01-18 PROCEDURE — 75410000000 HC DELIVERY VAGINAL/SINGLE: Performed by: OBSTETRICS & GYNECOLOGY

## 2021-01-18 PROCEDURE — 85025 COMPLETE CBC W/AUTO DIFF WBC: CPT

## 2021-01-18 PROCEDURE — 65270000029 HC RM PRIVATE

## 2021-01-18 PROCEDURE — 77030014125 HC TY EPDRL BBMI -B: Performed by: ANESTHESIOLOGY

## 2021-01-18 PROCEDURE — 74011250636 HC RX REV CODE- 250/636: Performed by: ANESTHESIOLOGY

## 2021-01-18 RX ORDER — SWAB
1 SWAB, NON-MEDICATED MISCELLANEOUS DAILY
Status: DISCONTINUED | OUTPATIENT
Start: 2021-01-19 | End: 2021-01-20 | Stop reason: HOSPADM

## 2021-01-18 RX ORDER — OXYTOCIN/RINGER'S LACTATE 30/500 ML
1-25 PLASTIC BAG, INJECTION (ML) INTRAVENOUS
Status: DISCONTINUED | OUTPATIENT
Start: 2021-01-18 | End: 2021-01-18

## 2021-01-18 RX ORDER — FENTANYL/BUPIVACAINE/NS/PF 2-1250MCG
10 PREFILLED PUMP RESERVOIR EPIDURAL CONTINUOUS
Status: DISCONTINUED | OUTPATIENT
Start: 2021-01-18 | End: 2021-01-18

## 2021-01-18 RX ORDER — NALOXONE HYDROCHLORIDE 0.4 MG/ML
0.4 INJECTION, SOLUTION INTRAMUSCULAR; INTRAVENOUS; SUBCUTANEOUS AS NEEDED
Status: DISCONTINUED | OUTPATIENT
Start: 2021-01-18 | End: 2021-01-20 | Stop reason: HOSPADM

## 2021-01-18 RX ORDER — OXYTOCIN/RINGER'S LACTATE 30/500 ML
10 PLASTIC BAG, INJECTION (ML) INTRAVENOUS AS NEEDED
Status: COMPLETED | OUTPATIENT
Start: 2021-01-18 | End: 2021-01-18

## 2021-01-18 RX ORDER — ONDANSETRON 4 MG/1
4 TABLET, ORALLY DISINTEGRATING ORAL
Status: ACTIVE | OUTPATIENT
Start: 2021-01-18 | End: 2021-01-19

## 2021-01-18 RX ORDER — SODIUM CHLORIDE, SODIUM LACTATE, POTASSIUM CHLORIDE, CALCIUM CHLORIDE 600; 310; 30; 20 MG/100ML; MG/100ML; MG/100ML; MG/100ML
125 INJECTION, SOLUTION INTRAVENOUS CONTINUOUS
Status: DISCONTINUED | OUTPATIENT
Start: 2021-01-18 | End: 2021-01-18

## 2021-01-18 RX ORDER — OXYTOCIN/RINGER'S LACTATE 30/500 ML
87.3 PLASTIC BAG, INJECTION (ML) INTRAVENOUS AS NEEDED
Status: DISCONTINUED | OUTPATIENT
Start: 2021-01-18 | End: 2021-01-20 | Stop reason: HOSPADM

## 2021-01-18 RX ORDER — SODIUM CHLORIDE 0.9 % (FLUSH) 0.9 %
5-40 SYRINGE (ML) INJECTION AS NEEDED
Status: DISCONTINUED | OUTPATIENT
Start: 2021-01-18 | End: 2021-01-20 | Stop reason: HOSPADM

## 2021-01-18 RX ORDER — DOCUSATE SODIUM 100 MG/1
100 CAPSULE, LIQUID FILLED ORAL 2 TIMES DAILY
Status: DISCONTINUED | OUTPATIENT
Start: 2021-01-19 | End: 2021-01-20 | Stop reason: HOSPADM

## 2021-01-18 RX ORDER — ZOLPIDEM TARTRATE 5 MG/1
5 TABLET ORAL
Status: DISCONTINUED | OUTPATIENT
Start: 2021-01-18 | End: 2021-01-20 | Stop reason: HOSPADM

## 2021-01-18 RX ORDER — HYDROGEN PEROXIDE 3 %
SOLUTION, NON-ORAL MISCELLANEOUS DAILY
COMMUNITY
End: 2022-10-20

## 2021-01-18 RX ORDER — LIDOCAINE HYDROCHLORIDE AND EPINEPHRINE 15; 5 MG/ML; UG/ML
INJECTION, SOLUTION EPIDURAL AS NEEDED
Status: DISCONTINUED | OUTPATIENT
Start: 2021-01-18 | End: 2021-01-19 | Stop reason: HOSPADM

## 2021-01-18 RX ORDER — HYDROCORTISONE ACETATE PRAMOXINE HCL 2.5; 1 G/100G; G/100G
CREAM TOPICAL AS NEEDED
Status: DISCONTINUED | OUTPATIENT
Start: 2021-01-18 | End: 2021-01-20 | Stop reason: HOSPADM

## 2021-01-18 RX ORDER — NALOXONE HYDROCHLORIDE 0.4 MG/ML
0.4 INJECTION, SOLUTION INTRAMUSCULAR; INTRAVENOUS; SUBCUTANEOUS AS NEEDED
Status: DISCONTINUED | OUTPATIENT
Start: 2021-01-18 | End: 2021-01-18

## 2021-01-18 RX ORDER — OXYTOCIN/RINGER'S LACTATE 30/500 ML
87.3 PLASTIC BAG, INJECTION (ML) INTRAVENOUS AS NEEDED
Status: COMPLETED | OUTPATIENT
Start: 2021-01-18 | End: 2021-01-18

## 2021-01-18 RX ORDER — DIPHENHYDRAMINE HCL 25 MG
25 CAPSULE ORAL
Status: DISCONTINUED | OUTPATIENT
Start: 2021-01-18 | End: 2021-01-20 | Stop reason: HOSPADM

## 2021-01-18 RX ORDER — MAG HYDROX/ALUMINUM HYD/SIMETH 200-200-20
30 SUSPENSION, ORAL (FINAL DOSE FORM) ORAL
Status: DISCONTINUED | OUTPATIENT
Start: 2021-01-18 | End: 2021-01-18

## 2021-01-18 RX ORDER — SODIUM CHLORIDE 0.9 % (FLUSH) 0.9 %
5-40 SYRINGE (ML) INJECTION EVERY 8 HOURS
Status: DISCONTINUED | OUTPATIENT
Start: 2021-01-18 | End: 2021-01-20 | Stop reason: HOSPADM

## 2021-01-18 RX ORDER — LIDOCAINE HYDROCHLORIDE 10 MG/ML
20 INJECTION INFILTRATION; PERINEURAL ONCE
Status: DISCONTINUED | OUTPATIENT
Start: 2021-01-18 | End: 2021-01-18

## 2021-01-18 RX ORDER — NALBUPHINE HYDROCHLORIDE 10 MG/ML
10 INJECTION, SOLUTION INTRAMUSCULAR; INTRAVENOUS; SUBCUTANEOUS
Status: DISCONTINUED | OUTPATIENT
Start: 2021-01-18 | End: 2021-01-18

## 2021-01-18 RX ORDER — IBUPROFEN 800 MG/1
800 TABLET ORAL
Status: DISCONTINUED | OUTPATIENT
Start: 2021-01-18 | End: 2021-01-20 | Stop reason: HOSPADM

## 2021-01-18 RX ORDER — HYDROCODONE BITARTRATE AND ACETAMINOPHEN 5; 325 MG/1; MG/1
1 TABLET ORAL
Status: DISCONTINUED | OUTPATIENT
Start: 2021-01-18 | End: 2021-01-20 | Stop reason: HOSPADM

## 2021-01-18 RX ORDER — EPHEDRINE SULFATE/0.9% NACL/PF 50 MG/5 ML
10 SYRINGE (ML) INTRAVENOUS
Status: DISCONTINUED | OUTPATIENT
Start: 2021-01-18 | End: 2021-01-18

## 2021-01-18 RX ORDER — FENTANYL CITRATE 50 UG/ML
INJECTION, SOLUTION INTRAMUSCULAR; INTRAVENOUS AS NEEDED
Status: DISCONTINUED | OUTPATIENT
Start: 2021-01-18 | End: 2021-01-19 | Stop reason: HOSPADM

## 2021-01-18 RX ORDER — SIMETHICONE 80 MG
80 TABLET,CHEWABLE ORAL
Status: DISCONTINUED | OUTPATIENT
Start: 2021-01-18 | End: 2021-01-20 | Stop reason: HOSPADM

## 2021-01-18 RX ORDER — ONDANSETRON 2 MG/ML
4 INJECTION INTRAMUSCULAR; INTRAVENOUS
Status: DISCONTINUED | OUTPATIENT
Start: 2021-01-18 | End: 2021-01-18

## 2021-01-18 RX ADMIN — Medication 10 ML/HR: at 12:27

## 2021-01-18 RX ADMIN — SODIUM CHLORIDE, SODIUM LACTATE, POTASSIUM CHLORIDE, CALCIUM CHLORIDE 87.3 MILLI-UNITS/MIN: 600; 310; 30; 20 INJECTION, SOLUTION INTRAVENOUS at 20:54

## 2021-01-18 RX ADMIN — IBUPROFEN 800 MG: 800 TABLET ORAL at 23:38

## 2021-01-18 RX ADMIN — FENTANYL CITRATE 25 MCG: 0.05 INJECTION, SOLUTION INTRAMUSCULAR; INTRAVENOUS at 12:09

## 2021-01-18 RX ADMIN — SODIUM CHLORIDE, SODIUM LACTATE, POTASSIUM CHLORIDE, CALCIUM CHLORIDE 2 MILLI-UNITS/MIN: 600; 310; 30; 20 INJECTION, SOLUTION INTRAVENOUS at 11:24

## 2021-01-18 RX ADMIN — SODIUM CHLORIDE, POTASSIUM CHLORIDE, SODIUM LACTATE AND CALCIUM CHLORIDE 125 ML/HR: 600; 310; 30; 20 INJECTION, SOLUTION INTRAVENOUS at 14:57

## 2021-01-18 RX ADMIN — SODIUM CHLORIDE, POTASSIUM CHLORIDE, SODIUM LACTATE AND CALCIUM CHLORIDE 125 ML/HR: 600; 310; 30; 20 INJECTION, SOLUTION INTRAVENOUS at 10:58

## 2021-01-18 RX ADMIN — SODIUM CHLORIDE, SODIUM LACTATE, POTASSIUM CHLORIDE, CALCIUM CHLORIDE 10 MILLI-UNITS: 600; 310; 30; 20 INJECTION, SOLUTION INTRAVENOUS at 20:54

## 2021-01-18 RX ADMIN — FENTANYL CITRATE 75 MCG: 0.05 INJECTION, SOLUTION INTRAMUSCULAR; INTRAVENOUS at 12:17

## 2021-01-18 RX ADMIN — ALUMINUM HYDROXIDE, MAGNESIUM HYDROXIDE, AND SIMETHICONE 30 ML: 200; 200; 20 SUSPENSION ORAL at 19:38

## 2021-01-18 RX ADMIN — LIDOCAINE HYDROCHLORIDE AND EPINEPHRINE 3.5 ML: 15; 5 INJECTION, SOLUTION EPIDURAL at 12:17

## 2021-01-18 RX ADMIN — Medication 10 ML/HR: at 19:08

## 2021-01-18 RX ADMIN — SODIUM CHLORIDE, SODIUM LACTATE, POTASSIUM CHLORIDE, CALCIUM CHLORIDE 10000 MILLI-UNITS: 600; 310; 30; 20 INJECTION, SOLUTION INTRAVENOUS at 23:38

## 2021-01-18 NOTE — ANESTHESIA PROCEDURE NOTES
CSE Block    Start time: 1/18/2021 12:05 PM  End time: 1/18/2021 12:22 PM  Performed by: Dylon Drake DO  Authorized by: Dylon Drake DO     Pre-Procedure  Indications: procedure for pain    preanesthetic checklist: patient identified, risks and benefits discussed, anesthesia consent, patient being monitored and timeout performed    Timeout Time: 12:03        Procedure:   Patient Position:  Seated  Prep Region:  Lumbar  Prep: Betadine    Location:  L4-5    Epidural Needle:   Needle Type:  William Sung  Needle Gauge:  18 G  Injection Technique:  Loss of resistance using air  Attempts:  1    Spinal Needle:   Needle Type:   Omer  Needle Gauge:  27 G    Catheter:   Catheter Type:  Standard  Catheter Size:  20 G  Catheter at Skin Depth (cm):  7.5  Depth in Epidural Space (cm):  2.5  Events: no blood with aspiration, no cerebrospinal fluid with aspiration, no paresthesia and negative aspiration test    Test Dose:  Negative    Assessment:   Catheter Secured:  Tegaderm and tape  Insertion:  Uncomplicated  Patient tolerance:  Patient tolerated the procedure well with no immediate complications  Space seemed bigger at L4-5

## 2021-01-18 NOTE — PROGRESS NOTES
Labor Progress Note  Patient seen, fetal heart rate and contraction pattern evaluated, patient examined.  Has epidural  Visit Vitals  /64   Pulse 73   Temp 98.1 °F (36.7 °C)   Resp 16   Ht 5' 1\" (1.549 m)   Wt 172 lb (78 kg)   LMP 03/10/2020 (Approximate)   SpO2 100%   Breastfeeding No   BMI 32.50 kg/m²       Physical Exam:  Cervical Exam:  5/90 %/ /   Membranes:  forebag ruptured clear  Uterine Activity: Frequency: Every 2 minutes  Fetal Heart Rate: Reactive    Assessment/Plan:  Reassuring fetal status, Continue plan for vaginal delivery   IUPC placed to better evaluate labor  Pitocin decreased as contractions appear too close - reeval after IUPC  Cont plan for vaginal delivery

## 2021-01-18 NOTE — ANESTHESIA PREPROCEDURE EVALUATION
Relevant Problems   No relevant active problems       Anesthetic History   No history of anesthetic complications            Review of Systems / Medical History  Patient summary reviewed, nursing notes reviewed and pertinent labs reviewed    Pulmonary  Within defined limits                 Neuro/Psych         Psychiatric history    Comments: ADD Cardiovascular  Within defined limits                Exercise tolerance: >4 METS     GI/Hepatic/Renal  Within defined limits              Endo/Other             Other Findings   Comments: Thrombocytopenia           Physical Exam    Airway  Mallampati: II    Neck ROM: normal range of motion   Mouth opening: Normal     Cardiovascular    Rhythm: regular  Rate: normal         Dental  No notable dental hx       Pulmonary  Breath sounds clear to auscultation               Abdominal         Other Findings            Anesthetic Plan    ASA: 2  Anesthesia type: spinal and epidural            Anesthetic plan and risks discussed with: Patient      Informed consent obtained.

## 2021-01-18 NOTE — PROGRESS NOTES
Pt arrived to L&D from office with c/o SROM. Pt just examined in office, sve = 4/70. Per MD, admit pt and start pitocin. Pt may get epidural prn.     6019 Dr. Uma Castillo at bedside for epidural placement. [phone] Dr. Jin Thomas at bedside. MD FERRARI'sherley forebag, SVE = 5/90. IUPC placed by MD.    0403 RN to bedside, pt grimacing, states she is \"getting no relief. \" Pt states she is feeling pain in her lower belly. RN inquired about incisional pain. Pt states she is feeling pain at the incision site and over the past few days when she has had thor aguilar contractions she has primarily felt the tightening at this incision site. Rn provided pt with epidural button and educated on it, will try to see if it is a \"hot spot\" that can be resolved. Will call MD.     9253 Dr. Jin Thomas called in regards to pt complaint of pain. MD reviewed fetal tracing. If pain unresolved will get redose, okay with MD. No new orders received at this time. 46 Pt aware of conversation with Dr. Jin Thomas. Pt reassured. If pain unresolved will call anesthesia. Pt instructed to hit call bell if pain worsens or changes, verbalized understanding. 1615 Pt reports noticing some improvement in pain after pushing epidural button. 1632 Pt reports noticing more improvement in pain after pushing epidural button x2. Turned to left side with peanut ball. 1900 Bedside shift change report given to NEELA Valiente RN (oncoming nurse) by NEELA Jennings RN (offgoing nurse). Report included the following information SBAR, Kardex, Intake/Output, MAR, Recent Results and Med Rec Status.

## 2021-01-18 NOTE — TELEPHONE ENCOUNTER
Call received through the ER line at 629am    31year old  39w5d pregnant patient last seen in the office on 2021    Patient calling to say that she has been having contractions since 2 am that are about every 9-10 minutes and have slowed down slightly. Patient reports fluid leaking about 30-45 minutes ago and it is running down her leg. Patient reports slight bleeding when wiping and reports positive fetal movement    Patient was placed on the schedule to be seen at 9:30am today ( 0k per GS)  Patient verbalized understanding.

## 2021-01-18 NOTE — PROGRESS NOTES
Problem List  Date Reviewed: 1/13/2021          Codes Class Noted    Encounter for supervision of other normal pregnancy, first trimester ICD-10-CM: Z34.81  ICD-9-CM: V22.1  6/4/2020    Overview Addendum 12/29/2020 10:21 AM by Elise Huynh     EDC determined next visit  Hx of CS for breech  adderall  Horizon neg  NIPTS- normal male- Pt notified  AFP- negative  Flu vaccine 10/01/2020  plt 127K at 28 weeks - 34 weeks plts 98K - repeat next visit  Repeat C/S 1/20/21. Pt notified.   GBS neg  plts 98K - repeat next visit

## 2021-01-18 NOTE — PROGRESS NOTES
Complains of SROM at 7am today and sig decreased FM - already admitted for this once before  Baby moving    Pos pooling, neg fern  Membrane not palpable    Disc risk of pitocin 2-3% risk of rupture - to L&D now

## 2021-01-18 NOTE — H&P
History & Physical    Name: Cris Winters MRN: [id number]  SSN: [id number]    YOB: 1990  Age: 31 y.o. Sex: female        Subjective:     Estimated Date of Delivery: 21  OB History        2    Para   1    Term   1            AB        Living   1       SAB        TAB        Ectopic        Molar        Multiple   0    Live Births   1                Ms. Darío Daniels is admitted with pregnancy at 39w5d for Presumptive ROM . Prenatal course was complicated by prior LTCS for breech, gestational thrombocytopenia. Please see prenatal records for details. Past Medical History:   Diagnosis Date    Abnormal Papanicolaou smear of cervix     Depression     Psychiatric problem     ADD     Past Surgical History:   Procedure Laterality Date    HX  SECTION      HX COLPOSCOPY  2016    neg path    HX OTHER SURGICAL      Soso teeth    HX WISDOM TEETH EXTRACTION       Social History     Occupational History    Not on file   Tobacco Use    Smoking status: Never Smoker    Smokeless tobacco: Never Used   Substance and Sexual Activity    Alcohol use: No    Drug use: No    Sexual activity: Yes     Partners: Male     Family History   Problem Relation Age of Onset    Hypertension Mother     Lupus Sister     Breast Cancer Maternal Grandmother         Breast, kidney and bone CA       Allergies   Allergen Reactions    Sulfa (Sulfonamide Antibiotics) Rash     Prior to Admission medications    Medication Sig Start Date End Date Taking? Authorizing Provider   esomeprazole (NexIUM) 20 mg capsule Take  by mouth daily. Yes Provider, Historical   prenatal vitamins without calcium #5-iron-FA (PRENATAL-U) 106. 5-1 mg cap capsule Take 1 Cap by mouth daily.    Yes Provider, Historical   dextroamphetamine-amphetamine (ADDERALL) 20 mg tablet TAKE 1 TABLET BY MOUTH TWICE A DAY 19  Yes Provider, Historical        Review of Systems: A comprehensive review of systems was negative except for that written in the HPI. Objective:     Vitals:  Vitals:    01/18/21 1049 01/18/21 1055   BP: 113/78    Pulse: (!) 102    Resp: 16    Temp: 98.3 °F (36.8 °C)    Weight:  172 lb (78 kg)   Height:  5' 1\" (1.549 m)        Physical Exam:  Heart: Regular rate and rhythm  Lung: clear to auscultation throughout lung fields, no wheezes, no rales, no rhonchi and normal respiratory effort  Abdomen: soft, nontender  Fundus: soft and non tender  Perineum: blood absent, amniotic fluid present  Cervical Exam: 4 cm dilated    70% effaced    -3 station    Presenting Part: cephalic  Cervical Position: mid position  Consistency: Soft  Membranes:  Premature Rupture of Membranes; Amniotic Fluid: clear fluid  Fetal Heart Rate: Reactive    Prenatal Labs:   Lab Results   Component Value Date/Time    Rubella, External 2.92-Immune 05/29/2020    GrBStrep, External Negative 12/22/2020    HBsAg, External Negative 05/29/2020    HIV, External Negative 05/29/2020    RPR, External neg 03/15/2018    Gonorrhea, External Negative 05/29/2020    Chlamydia, External Negative 05/29/2020      Results for orders placed or performed during the hospital encounter of 01/18/21   CBC WITH AUTOMATED DIFF   Result Value Ref Range    WBC 12.7 (H) 3.6 - 11.0 K/uL    RBC 4.19 3.80 - 5.20 M/uL    HGB 12.8 11.5 - 16.0 g/dL    HCT 38.2 35.0 - 47.0 %    MCV 91.2 80.0 - 99.0 FL    MCH 30.5 26.0 - 34.0 PG    MCHC 33.5 30.0 - 36.5 g/dL    RDW 13.3 11.5 - 14.5 %    PLATELET 450 (L) 445 - 400 K/uL    NRBC 0.2 (H) 0  WBC    ABSOLUTE NRBC 0.02 (H) 0.00 - 0.01 K/uL    NEUTROPHILS 72 32 - 75 %    LYMPHOCYTES 20 12 - 49 %    MONOCYTES 7 5 - 13 %    EOSINOPHILS 0 0 - 7 %    BASOPHILS 0 0 - 1 %    IMMATURE GRANULOCYTES 1 (H) 0.0 - 0.5 %    ABS. NEUTROPHILS 9.1 (H) 1.8 - 8.0 K/UL    ABS. LYMPHOCYTES 2.5 0.8 - 3.5 K/UL    ABS. MONOCYTES 0.9 0.0 - 1.0 K/UL    ABS. EOSINOPHILS 0.0 0.0 - 0.4 K/UL    ABS. BASOPHILS 0.0 0.0 - 0.1 K/UL    ABS. IMM.  GRANS. 0.1 (H) 0.00 - 0.04 K/UL    DF AUTOMATED           Assessment/Plan:     Plan: Admit for Reassuring fetal status, Continue plan for vaginal delivery. Group B Strep was negative. Pt strongly desires . Disc 2-3% increase risk with pitocin for uterine rupture. Epidural prn.  Check plts    Signed By:  Lionel Johnson MD     2021

## 2021-01-19 PROCEDURE — 65270000029 HC RM PRIVATE

## 2021-01-19 PROCEDURE — 74011250637 HC RX REV CODE- 250/637: Performed by: OBSTETRICS & GYNECOLOGY

## 2021-01-19 RX ORDER — IBUPROFEN 800 MG/1
800 TABLET ORAL
Qty: 60 TAB | Refills: 0 | Status: SHIPPED | OUTPATIENT
Start: 2021-01-19

## 2021-01-19 RX ADMIN — DOCUSATE SODIUM 100 MG: 100 CAPSULE, LIQUID FILLED ORAL at 08:29

## 2021-01-19 RX ADMIN — HYDROCODONE BITARTRATE AND ACETAMINOPHEN 1 TABLET: 5; 325 TABLET ORAL at 19:28

## 2021-01-19 RX ADMIN — HYDROCODONE BITARTRATE AND ACETAMINOPHEN 1 TABLET: 5; 325 TABLET ORAL at 03:17

## 2021-01-19 RX ADMIN — IBUPROFEN 800 MG: 800 TABLET ORAL at 23:23

## 2021-01-19 RX ADMIN — IBUPROFEN 800 MG: 800 TABLET ORAL at 07:14

## 2021-01-19 RX ADMIN — Medication 1 TABLET: at 08:29

## 2021-01-19 RX ADMIN — HYDROCODONE BITARTRATE AND ACETAMINOPHEN 1 TABLET: 5; 325 TABLET ORAL at 08:29

## 2021-01-19 RX ADMIN — HYDROCODONE BITARTRATE AND ACETAMINOPHEN 1 TABLET: 5; 325 TABLET ORAL at 23:23

## 2021-01-19 RX ADMIN — HYDROCODONE BITARTRATE AND ACETAMINOPHEN 1 TABLET: 5; 325 TABLET ORAL at 13:41

## 2021-01-19 RX ADMIN — IBUPROFEN 800 MG: 800 TABLET ORAL at 15:17

## 2021-01-19 NOTE — L&D DELIVERY NOTE
This patient was 30 or more weeks gestation at the time of [facility] go-live. For complete information pertaining to this patient's pregnancy, please refer to the paper chart and ACOG form. Delivery Note    Obstetrician:  Adelaida Villafuerte MD    Assistant: richie    Pre-Delivery Diagnosis: Term pregnancy    Post-Delivery Diagnosis: Living  infant(s) and Male    Intrapartum Event: None    Procedure: Spontaneous vaginal delivery    Epidural: YES    Monitor:  Fetal Heart Tones - External and Uterine Contractions - External    Indications for instrumental delivery: none    Estimated Blood Loss: 300    Episiotomy: n/a    Laceration(s):  none    Laceration(s) repair: NO    Presentation: Cephalic    Fetal Description: johnson    Fetal Position: Right Occiput Anterior    Birth Weight: pending    Birth Length: pending    Apgar - One Minute: 8    Apgar - Five Minutes: 9    Umbilical Cord: 3 vessels present    Specimens: placenta, discarded           Complications:  none           Cord Blood Results:   Information for the patient's :  Andrews King, Male [facility] [[id number]]   No results found for: Robbi Pak, [address]     Prenatal Labs:     Lab Results   Component Value Date/Time    ABO/Rh(D) B POSITIVE 2018 08:18 AM    ABO,Rh B POS 2020    HBsAg, External Negative 2020    HIV, External Negative 2020    Rubella, External 2.92-Immune 2020    RPR, External neg 03/15/2018    Gonorrhea, External Negative 2020    Chlamydia, External Negative 2020    GrBStrep, External Negative 2020        Attending Attestation: I performed the procedure    Signed By:  Adelaida Villafuerte MD     2021

## 2021-01-19 NOTE — LACTATION NOTE
This note was copied from a baby's chart. Reviewed breastfeeding basics:  Supply and demand,  stomach size, early  Feeding cues, skin to skin, positioning and baby led latch-on,  latched with signs of good, deep latch vs shallow, feeding frequency and duration, and log sheet for tracking infant feedings and output. Breastfeeding Booklet and Warm line information given. Discussed typical  weight loss and the importance of infant weight checks with pediatrician 1-2 post discharge. Hand Expression Education:  Mom comfortable with  manually hand express her colostrum. Emphasized the importance of providing infant with valuable colostrum as infant rests skin to skin at breast.  Aware to avoid extended periods of non-feeding. Aware to offer 10-20+ drops of colostrum every 2-3 hours until infant is latching and nursing effectively. Taught the rationale behind this low tech but highly effective evidence based practice. Discussed with mother her plan for feeding. Reviewed the benefits of exclusive breast milk feeding during the hospital stay. Informed her of the risks of using formula to supplement in the first few days of life as well as the benefits of successful breast milk feeding; referred her to the Breastfeeding booklet about this information. She acknowledges understanding of information reviewed and states that it is her plan to breast feed her infant. Will support her choice and offer additional information as needed. Pt will successfully establish breastfeeding by feeding in response to early feeding cues   or wake every 3h, will obtain deep latch, and will keep log of feedings/output. Taught to BF at hunger cues and or q 2-3 hrs and to offer 10-20 drops of hand expressed colostrum at any non-feeds.       Breast Assessment  Left Breast: Large  Left Nipple: Everted, Intact  Right Breast: Large  Right Nipple: Everted, Intact  Breast- Feeding Assessment  Attends Breast-Feeding Classes: No  Breast-Feeding Experience: Yes(6 months with first)  Breast Trauma/Surgery: No  Type/Quality: Good  Lactation Consultant Visits  Breast-Feedings: Good   Mother/Infant Observation  Mother Observation: Alignment, Breast comfortable, Close hold, Holds breast, Lets baby end feeding  Infant Observation: Breast tissue moves, Latches nipple and aereolae, Lips flanged, lower, Lips flanged, upper, Opens mouth  LATCH Documentation  Latch: Grasps breast, tongue down, lips flanged, rhythmic sucking  Audible Swallowing: A few with stimulation  Type of Nipple: Everted (after stimulation)  Comfort (Breast/Nipple): Soft/non-tender  Hold (Positioning): No assist from staff, mother able to position/hold infant  LATCH Score: 9    Mom comfortable and relaxed with breast feeding.

## 2021-01-19 NOTE — PROGRESS NOTES
1/19/2021  8:23 AM    CM spoke with MOB via phone, to complete initial assessment and begin discharge planning. MOB verified and confirmed demographics. MOB lives with spouse/FOB-Carl Phan ([phone]), along with their 2 1/2 yr old, at the address on file. MOB is employed and plans to take 12 wks off from work. FOB is also employed and will be taking about 2 wks off. MOB reports she has good family support. MOB plans to breast feed baby and has pump to use at home. [facility] plans to follow with [facility]. MOB has car seat, bassinet/crib, clothing, bottles and all necessary supplies for baby. MOB has [employer], and will be adding baby to this policy. CM discussed process to add baby to insurance, MOB verbalized understanding. MOB denied needing WIC/Medicaid services. No further needs noted for MOB/baby. Care Management Interventions  PCP Verified by CM: Yes(Dr Jm Lara)  Mode of Transport at Discharge:  Other (see comment)  Transition of Care Consult (CM Consult): Discharge Planning  Current Support Network: Own Home, Family Lives Nearby, Lives with Spouse  Confirm Follow Up Transport: Family  Discharge Location  Discharge Placement: Home with family assistance  Sylvester Daniels

## 2021-01-19 NOTE — PROGRESS NOTES
Pt seen and examined.   Comfortable with epidural      SVE: complete +2  FHT: Cat I  Naranja: Q1-3      A/ TOLAC complete    P/ begin pushing

## 2021-01-19 NOTE — PROGRESS NOTES
of viable male infant, skid car noted, no repair needed    0015- Pt up to BR, walk slow steady gait, able to void 300 ml, pericare reviewed, pt back to bed    0040- SBAR OUT Report: Mother    Verbal report given to NEELA Wong rn (full name & credentials) on this patient, who is now being transferred to MIU (unit) for routine progression of care. Report consisted of patient's Situation, Background, Assessment and Recommendations (SBAR).  ID bands were compared with the identification form, and verified with the patient and receiving nurse. Information from the SBAR and the [employer] Report was reviewed with the receiving nurse; opportunity for questions and clarification provided.

## 2021-01-19 NOTE — ROUTINE PROCESS
Bedside and Verbal shift change report given to Via Salvador Haile RN (oncoming nurse) by Carlton Beckett RN (offgoing nurse). Report included the following information SBAR, Kardex and MAR.

## 2021-01-20 VITALS
SYSTOLIC BLOOD PRESSURE: 116 MMHG | WEIGHT: 172 LBS | OXYGEN SATURATION: 98 % | DIASTOLIC BLOOD PRESSURE: 62 MMHG | BODY MASS INDEX: 32.47 KG/M2 | HEART RATE: 80 BPM | HEIGHT: 61 IN | TEMPERATURE: 98.1 F | RESPIRATION RATE: 16 BRPM

## 2021-01-20 PROCEDURE — 74011250637 HC RX REV CODE- 250/637: Performed by: OBSTETRICS & GYNECOLOGY

## 2021-01-20 RX ADMIN — DOCUSATE SODIUM 100 MG: 100 CAPSULE, LIQUID FILLED ORAL at 07:57

## 2021-01-20 RX ADMIN — HYDROCODONE BITARTRATE AND ACETAMINOPHEN 1 TABLET: 5; 325 TABLET ORAL at 07:57

## 2021-01-20 RX ADMIN — IBUPROFEN 800 MG: 800 TABLET ORAL at 07:57

## 2021-01-20 RX ADMIN — HYDROCODONE BITARTRATE AND ACETAMINOPHEN 1 TABLET: 5; 325 TABLET ORAL at 03:38

## 2021-01-20 RX ADMIN — Medication 1 TABLET: at 07:57

## 2021-01-20 NOTE — ROUTINE PROCESS
Patient off unit in stable condition via wheelchair with volunteers for discharge home per Dr. Cait Kumar. Patient is to follow up in 6 weeks and is aware. Prescriptions given to patient. Patient denies any headache, dizziness, nausea/vomitting, or pain at this time. Infant in car seat with mother.

## 2021-01-20 NOTE — PROGRESS NOTES
Post-Partum Day Number 1 Progress Note    Nivia Abler       Information for the patient's :  Blase Shone, Male Zoë Yeboah [[id number]]   Vaginal Birth After     Patient doing well without significant complaint. Voiding without difficulty, normal lochia. Pt is breastfeeding    Vitals:  Visit Vitals  /81 (BP 1 Location: Left arm, BP Patient Position: At rest)   Pulse 86   Temp 98.1 °F (36.7 °C)   Resp 18   Ht 5' 1\" (1.549 m)   Wt 172 lb (78 kg)   LMP 03/10/2020 (Approximate)   SpO2 98%   Breastfeeding Unknown   BMI 32.50 kg/m²     Temp (24hrs), Av.2 °F (36.8 °C), Min:97.8 °F (36.6 °C), Max:98.7 °F (37.1 °C)        Exam:   Patient without distress. Abdomen soft, fundus firm, nontender                Perineum with normal lochia noted. Lower extremities are negative for swelling, cords or tenderness.     Labs:     Lab Results   Component Value Date/Time    WBC 12.7 (H) 2021 10:50 AM    WBC 10.5 2021 04:08 PM    WBC 12.4 (H) 2021 10:20 AM    WBC 10.9 2020 12:39 PM    WBC 9.4 2020 11:43 AM    WBC 9.6 10/29/2020 10:57 AM    WBC 5.3 2020 08:56 AM    WBC 8.0 2018 09:41 AM    WBC 9.7 2018 01:41 AM    WBC 9.6 2018 08:18 AM    WBC 9.7 2018 01:53 PM    WBC 10.8 2018 12:29 PM    WBC 10.2 2018 12:22 PM    WBC 11.5 (H) 2018 03:56 PM    WBC 9.5 2018 11:45 AM    HGB 12.8 2021 10:50 AM    HGB 11.9 2021 04:08 PM    HGB 12.1 2021 10:20 AM    HGB 11.9 2020 12:39 PM    HGB 11.4 (L) 2020 11:43 AM    HGB 11.6 10/29/2020 10:57 AM    HGB 13.5 2020 08:56 AM    HGB 8.3 (L) 2018 09:41 AM    HGB 8.3 (L) 2018 01:41 AM    HGB 11.1 (L) 2018 08:18 AM    HGB 10.6 (L) 2018 01:53 PM    HGB 11.2 2018 12:29 PM    HGB 11.0 (L) 2018 12:22 PM    HGB 11.1 2018 03:56 PM    HGB 11.5 2018 11:45 AM    HCT 38.2 2021 10:50 AM    HCT 36.5 01/13/2021 04:08 PM    HCT 36.8 01/08/2021 10:20 AM    HCT 36.9 12/30/2020 12:39 PM    HCT 35.1 12/22/2020 11:43 AM    HCT 35.2 10/29/2020 10:57 AM    HCT 40.9 05/29/2020 08:56 AM    HCT 25.7 (L) 09/22/2018 09:41 AM    HCT 25.7 (L) 09/20/2018 01:41 AM    HCT 33.4 (L) 09/19/2018 08:18 AM    HCT 32.9 (L) 09/17/2018 01:53 PM    HCT 34.1 09/11/2018 12:29 PM    HCT 34.2 09/04/2018 12:22 PM    HCT 34.7 08/06/2018 03:56 PM    HCT 34.3 07/09/2018 11:45 AM    PLATELET 235 (L) 82/49/2309 10:50 AM    PLATELET 778 (L) 94/79/6893 04:08 PM    PLATELET 101 (L) 67/00/3952 10:20 AM    PLATELET 320 (L) 51/45/7877 12:39 PM    PLATELET 98 (L) 77/82/3678 11:43 AM    PLATELET 929 (L) 40/94/0186 10:57 AM    PLATELET 059 30/94/6736 08:56 AM    PLATELET 592 (L) 12/35/3386 09:41 AM    PLATELET 88 (L) 32/67/2281 01:41 AM    PLATELET 733 (L) 04/18/4029 08:18 AM    PLATELET 340 (L) 06/06/1407 01:53 PM    PLATELET 511 (L) 18/23/5660 12:29 PM    PLATELET 501 (L) 94/46/1601 12:22 PM    PLATELET 028 (L) 52/80/7110 03:56 PM    PLATELET 915 (L) 25/41/1292 11:45 AM    Hgb, External 13.5 05/29/2020    Hgb, External 11.1 08/06/2018    Hgb, External 11.5 07/09/2018    Hgb, External 12.7 03/15/2018    Hct, External 40.9 05/29/2020    Hct, External 34.7 08/06/2018    Hct, External 34.3 07/09/2018    Hct, External 38.2 03/15/2018    Platelet cnt., External 155 05/29/2020    Platelet cnt., External 110 09/17/2018    Platelet cnt., External 125 08/06/2018    Platelet cnt., External 131 07/09/2018    Platelet cnt., External 166 03/15/2018       No results found for this or any previous visit (from the past 24 hour(s)). Assessment: Doing well, post partum day 1    Plan:  1. Continue routine postpartum and perineal care as well as maternal education.

## 2021-01-20 NOTE — DISCHARGE SUMMARY
Obstetrical Discharge Summary     Name: Lokesh Fisher MRN: [id number]  SSN: [id number]    YOB: 1990  Age: 31 y.o. Sex: female      Admit Date: 2021    Discharge Date: 2021     Admitting Physician: Bailey Bennett MD     Attending Physician:  Jackie Ramon MD     Admission Diagnoses: Previous  delivery affecting pregnancy [O34.219]; Pregnancy [Z34.90]    Discharge Diagnoses:   Information for the patient's :  Scott Garza, Male Courtney Torres [[id number]]   Delivery of a 8 lb 0.8 oz (3.65 kg) male infant via Vaginal Birth After   on 2021 at 8:46 PM  by Rico Banks. Apgars were 8  and 9 . Additional Diagnoses:   Hospital Problems  Date Reviewed: 2021          Codes Class Noted POA    Pregnancy ICD-10-CM: Z34.90  ICD-9-CM: V22.2  2021 Unknown             Lab Results   Component Value Date/Time    Rubella, External 2.92-Immune 2020    GrBStrep, External Negative 2020       Hospital Course: Normal hospital course following the delivery. Disposition: Home  Condition: Good    Patient Instructions:   Current Discharge Medication List      START taking these medications    Details   ibuprofen (MOTRIN) 800 mg tablet Take 1 Tab by mouth every six (6) hours as needed for Pain. Qty: 60 Tab, Refills: 0         CONTINUE these medications which have NOT CHANGED    Details   esomeprazole (NexIUM) 20 mg capsule Take  by mouth daily. prenatal vitamins without calcium #5-iron-FA (PRENATAL-U) 106. 5-1 mg cap capsule Take 1 Cap by mouth daily. dextroamphetamine-amphetamine (ADDERALL) 20 mg tablet TAKE 1 TABLET BY MOUTH TWICE A DAY  Refills: 0             Reference my discharge instructions.     Follow-up Appointments   Procedures    FOLLOW UP VISIT Appointment in: 6 Weeks     Standing Status:   Standing     Number of Occurrences:   1     Order Specific Question:   Appointment in     Answer:   6 Weeks        Signed By:  Bailey Bennett MD      2021

## 2021-01-20 NOTE — PROGRESS NOTES
Post-Partum Day Number 2 Progress Note    Donnald Porch     Information for the patient's :  Yuniel Jack, Male Kobe Holbrook [[id number]]   Vaginal Birth After     Patient doing well without significant complaint. Voiding without difficulty, normal lochia. Vitals:  Visit Vitals  /62 (BP 1 Location: Right arm, BP Patient Position: At rest)   Pulse 80   Temp 98.1 °F (36.7 °C)   Resp 16   Ht 5' 1\" (1.549 m)   Wt 172 lb (78 kg)   LMP 03/10/2020 (Approximate)   SpO2 98%   Breastfeeding Unknown   BMI 32.50 kg/m²     Temp (24hrs), Av.2 °F (36.8 °C), Min:98.1 °F (36.7 °C), Max:98.4 °F (36.9 °C)      Exam:         Patient without distress. Abdomen soft, fundus firm, nontender                 ower extremities are negative for swelling, cords or tenderness.     Labs:     Lab Results   Component Value Date/Time    WBC 12.7 (H) 2021 10:50 AM    WBC 10.5 2021 04:08 PM    WBC 12.4 (H) 2021 10:20 AM    WBC 10.9 2020 12:39 PM    WBC 9.4 2020 11:43 AM    WBC 9.6 10/29/2020 10:57 AM    WBC 5.3 2020 08:56 AM    WBC 8.0 2018 09:41 AM    WBC 9.7 2018 01:41 AM    WBC 9.6 2018 08:18 AM    WBC 9.7 2018 01:53 PM    WBC 10.8 2018 12:29 PM    WBC 10.2 2018 12:22 PM    WBC 11.5 (H) 2018 03:56 PM    WBC 9.5 2018 11:45 AM    HGB 12.8 2021 10:50 AM    HGB 11.9 2021 04:08 PM    HGB 12.1 2021 10:20 AM    HGB 11.9 2020 12:39 PM    HGB 11.4 (L) 2020 11:43 AM    HGB 11.6 10/29/2020 10:57 AM    HGB 13.5 2020 08:56 AM    HGB 8.3 (L) 2018 09:41 AM    HGB 8.3 (L) 2018 01:41 AM    HGB 11.1 (L) 2018 08:18 AM    HGB 10.6 (L) 2018 01:53 PM    HGB 11.2 2018 12:29 PM    HGB 11.0 (L) 2018 12:22 PM    HGB 11.1 2018 03:56 PM    HGB 11.5 2018 11:45 AM    HCT 38.2 2021 10:50 AM    HCT 36.5 2021 04:08 PM    HCT 36.8 2021 10:20 AM    HCT 36.9 12/30/2020 12:39 PM    HCT 35.1 12/22/2020 11:43 AM    HCT 35.2 10/29/2020 10:57 AM    HCT 40.9 05/29/2020 08:56 AM    HCT 25.7 (L) 09/22/2018 09:41 AM    HCT 25.7 (L) 09/20/2018 01:41 AM    HCT 33.4 (L) 09/19/2018 08:18 AM    HCT 32.9 (L) 09/17/2018 01:53 PM    HCT 34.1 09/11/2018 12:29 PM    HCT 34.2 09/04/2018 12:22 PM    HCT 34.7 08/06/2018 03:56 PM    HCT 34.3 07/09/2018 11:45 AM    PLATELET 503 (L) 24/16/2766 10:50 AM    PLATELET 474 (L) 59/33/0956 04:08 PM    PLATELET 499 (L) 48/69/3398 10:20 AM    PLATELET 332 (L) 73/37/8381 12:39 PM    PLATELET 98 (L) 59/61/7569 11:43 AM    PLATELET 083 (L) 53/41/8305 10:57 AM    PLATELET 478 75/61/6883 08:56 AM    PLATELET 956 (L) 64/17/7053 09:41 AM    PLATELET 88 (L) 69/84/8322 01:41 AM    PLATELET 891 (L) 08/36/6696 08:18 AM    PLATELET 235 (L) 47/04/6175 01:53 PM    PLATELET 910 (L) 90/53/2526 12:29 PM    PLATELET 353 (L) 61/47/6895 12:22 PM    PLATELET 623 (L) 59/18/9487 03:56 PM    PLATELET 208 (L) 75/40/5318 11:45 AM    Hgb, External 13.5 05/29/2020    Hgb, External 11.1 08/06/2018    Hgb, External 11.5 07/09/2018    Hgb, External 12.7 03/15/2018    Hct, External 40.9 05/29/2020    Hct, External 34.7 08/06/2018    Hct, External 34.3 07/09/2018    Hct, External 38.2 03/15/2018    Platelet cnt., External 155 05/29/2020    Platelet cnt., External 110 09/17/2018    Platelet cnt., External 125 08/06/2018    Platelet cnt., External 131 07/09/2018    Platelet cnt., External 166 03/15/2018       No results found for this or any previous visit (from the past 24 hour(s)). Assessment: Doing well, post partum day 2    Plan:   1. Discharge home today  2. Follow up in office in 6 weeks with Jordyn Oliveros MD  3. Post partum activity advised, diet as tolerated  4.  Discharge Medications: ibuprofen,and medications prior to admission

## 2021-01-20 NOTE — LACTATION NOTE
This note was copied from a baby's chart. Chart shows numerous feedings, void, stool WNL. Discussed importance of monitoring outputs and feedings on first week of life. Discussed ways to tell if baby is  getting enough breast milk, ie  voids and stools, change in color of stool, and return to birth wt within 2 weeks. Follow up with pediatrician visit for weight check in 1-2 days (per AAP guidelines.)  Encouraged to call Warm Line  [phone]  for any questions/problems that arise. Mother also given breastfeeding support group dates and times for any future needs    Pt chooses to do both breast and bottle. Discussed effects of early supplementation on breastfeeding success; may decrease breastmilk production and supply, increase risk for pathological engorgement, baby may develop preference for faster flow from bottles vs breast, and baby's stomach can be stretched if larger volumes of formula are given. Engorgement Care Guidelines:  Reviewed how milk is made and normal phases of milk production. Taught care of engorged breasts - frequent breastfeeding encouraged, cool packs and motrin as tolerated. Anticipatory guidance shared. Pt will successfully establish breastfeeding by feeding in response to early feeding cues   or wake every 3h, will obtain deep latch, and will keep log of feedings/output. Taught to BF at hunger cues and or q 2-3 hrs and to offer 10-20 drops of hand expressed colostrum at any non-feeds.       Breast Assessment  Left Breast: (Patient preparing for discharge, exam deferred )  Left Nipple: Everted, Intact  Right Breast: Large  Right Nipple: Everted, Intact  Breast- Feeding Assessment  Attends Breast-Feeding Classes: No  Breast-Feeding Experience: Yes(6 months with first)  Breast Trauma/Surgery: No  Type/Quality: Fair(Per mom )  Lactation Consultant Visits  Breast-Feedings: (Did not see baby at breast)  Mother/Infant Observation  Mother Observation: Breast comfortable  Infant Observation: Breast tissue moves, Latches nipple and aereolae, Lips flanged, lower, Lips flanged, upper, Opens mouth  LATCH Documentation  Latch: Too sleepy or reluctant, no latch achieved  Audible Swallowing: None  Type of Nipple: Everted (after stimulation)  Comfort (Breast/Nipple): Soft/non-tender  Hold (Positioning): No assist from staff, mother able to position/hold infant  LATCH Score: 6 (Did not see baby at breast, latch score per mother.)    Mom now feeding formula by choice, concerned baby is not getting enough volume. Moms choice supported. Education performed regarding lactogenesis and normal  behaviors reviewed. Discussed paced bottle feeding as a method to help facilitate transition between breast and bottle. Engorgement precautions discussed.

## 2021-01-20 NOTE — ROUTINE PROCESS
TRANSFER - IN REPORT:    Verbal report received from Riky Colindres RN(name) on Beena Zhu  being received from L&D(unit) for routine progression of care      Report consisted of patients Situation, Background, Assessment and   Recommendations(SBAR). Information from the following report(s) SBAR, Intake/Output, MAR and Recent Results was reviewed with the receiving nurse. Opportunity for questions and clarification was provided. Assessment completed upon patients arrival to unit and care assumed.

## 2021-01-20 NOTE — DISCHARGE INSTRUCTIONS
POST DELIVERY DISCHARGE INSTRUCTIONS    Name: Juana Dumont  YOB: 1990  Primary Diagnosis: Active Problems:    Pregnancy (2021)        General:     Diet/Diet Restrictions:  Eight 8-ounce glasses of fluid daily (water, juices); avoid excessive caffeine intake. Meals/snacks as desired which are high in fiber and carbohydrates and low in fat and cholesterol. Medications:   {Medication reconciliation information is now added to the patient's AVS automatically when it is printed. There is no need to use this SmartLink in discharge instructions. Highlight this text and delete it to clear this message}      Physical Activity / Restrictions / Safety:     Avoid heavy lifting, no more that 8 lbs. For 2-3 weeks; No driving while taking narcotic pain medication. Post  patients should not drive until pain free. No intercourse 4-6 weeks, no douching or tampon use. May resume exercise in 6 weeks. Discharge Instructions/Special Treatment/Home Care Needs:     Continue prenatal vitamins. Continue to use squirt bottle with warm water on your episiotomy after each bathroom use until bleeding stops. If steri-strips applied to your incision, remove in 7 days. Take stool softeners daily. Call your doctor for the following:     Fever over 101 degrees by mouth. Vaginal bleeding heavier than a normal menstrual period or lost larger than a golf ball. Red streaks or increased swelling of legs, painful red streaks on your breast.  Painful urination, or increased pain, redness or discharge with your incision. Pain Management:     Pain Management:   Take Acetaminophen (Tylenol) or Ibuprofen (Advil, Motrin), as directed for pain. Use a warm Sitz bath 3 times daily to relieve episiotomy or hemorrhoidal discomfort. Heating pad to  incision as needed. For hemorrhoidal discomfort, use Tucks and Anusol cream as needed and directed.     Follow-Up Care:     Appointment with MD:   Follow-up Appointments   Procedures    FOLLOW UP VISIT Appointment in: 6 Weeks     Standing Status:   Standing     Number of Occurrences:   1     Order Specific Question:   Appointment in     Answer:   [place]     Telephone number: [phone]    Signed By: Alicia Trinidad MD                                                                                                   Date: 1/19/2021 Time: 7:43 PM'

## 2021-03-01 ENCOUNTER — OFFICE VISIT (OUTPATIENT)
Dept: OBGYN CLINIC | Age: 31
End: 2021-03-01
Payer: COMMERCIAL

## 2021-03-01 VITALS
DIASTOLIC BLOOD PRESSURE: 77 MMHG | SYSTOLIC BLOOD PRESSURE: 131 MMHG | HEIGHT: 61 IN | BODY MASS INDEX: 29.27 KG/M2 | WEIGHT: 155 LBS

## 2021-03-01 DIAGNOSIS — R51.9 WORSENING HEADACHES: ICD-10-CM

## 2021-03-01 PROCEDURE — 0503F POSTPARTUM CARE VISIT: CPT | Performed by: OBSTETRICS & GYNECOLOGY

## 2021-03-01 RX ORDER — BUTALBITAL, ACETAMINOPHEN AND CAFFEINE 50; 325; 40 MG/1; MG/1; MG/1
1 TABLET ORAL
Qty: 15 TAB | Refills: 0 | Status: SHIPPED | OUTPATIENT
Start: 2021-03-01 | End: 2021-03-01 | Stop reason: SDUPTHER

## 2021-03-01 RX ORDER — ACETAMINOPHEN AND CODEINE PHOSPHATE 120; 12 MG/5ML; MG/5ML
1 SOLUTION ORAL DAILY
Qty: 3 PACKAGE | Refills: 1 | Status: SHIPPED | OUTPATIENT
Start: 2021-03-01 | End: 2022-10-20

## 2021-03-01 RX ORDER — BUTALBITAL, ACETAMINOPHEN AND CAFFEINE 50; 325; 40 MG/1; MG/1; MG/1
1 TABLET ORAL
Qty: 15 TAB | Refills: 0 | Status: SHIPPED | OUTPATIENT
Start: 2021-03-01

## 2021-03-01 NOTE — PROGRESS NOTES
Postpartum evaluation    Ivone Graham is a 31 y.o. female who presents for a postpartum exam.   She has had a pounding headache for 6 weeks. Not relieved by tylenol    She is now six weeks post normal spontaneous vaginal delivery 1/18/2021    Her baby is doing well. She has had no menses since delivery. She has had the following significant problems since her delivery: none    The patient is breast feeding without difficulty. The patient would like to use OCP for birth control. She is currently taking: no medications. She is due for her next AE in 3 months.      Visit Vitals  /77   Ht 5' 1\" (1.549 m)   Wt 155 lb (70.3 kg)   Breastfeeding Yes   BMI 29.29 kg/m²       PHYSICAL EXAMINATION    Constitutional  · Appearance: well-nourished, well developed, alert, in no acute distress    HENT  · Head and Face: appears normal    Neck  · Inspection/Palpation: normal appearance, no masses or tenderness  · Lymph Nodes: no lymphadenopathy present  · Thyroid: gland size normal, nontender, no nodules or masses present on palpation    Breasts  · Inspection of Breasts: breasts symmetrical, no skin changes, no discharge present, nipple appearance normal, no skin retraction present  · Palpation of Breasts and Axillae: no masses present on palpation, no breast tenderness  · Axillary Lymph Nodes: no lymphadenopathy present    Gastrointestinal  · Abdominal Examination: abdomen non-tender to palpation, normal bowel sounds, no masses present  · Liver and spleen: no hepatomegaly present, spleen not palpable  · Hernias: no hernias identified    Genitourinary  · External Genitalia: normal appearance for age, no discharge present, no tenderness present, no inflammatory lesions present, no masses present, no atrophy present  · Vagina: normal vaginal vault without central or paravaginal defects, no discharge present, no inflammatory lesions present, no masses present  · Bladder: non-tender to palpation  · Urethra: appears normal  · Cervix: normal   · Uterus: normal size, shape and consistency  · Adnexa: no adnexal tenderness present, no adnexal masses present  · Perineum: perineum within normal limits, no evidence of trauma, no rashes or skin lesions present  · Anus: anus within normal limits, no hemorrhoids present  · Inguinal Lymph Nodes: no lymphadenopathy present    Skin  · General Inspection: no rash, no lesions identified    Neurologic/Psychiatric  · Mental Status:  · Orientation: grossly oriented to person, place and time  · Mood and Affect: mood normal, affect appropriate    Assessment:  Normal postpartum check  Headaches    Plan:  RTO for AE.  micronor  fioricet  See neurology

## 2021-03-01 NOTE — PATIENT INSTRUCTIONS
Postpartum: Care Instructions  Your Care Instructions  After childbirth (postpartum period), your body goes through many changes. Some of these changes happen over several weeks. In the hours after delivery, your body will begin to recover from childbirth while it prepares to breastfeed your . You may feel emotional during this time. Your hormones can shift your mood without warning for no clear reason. In the first couple of weeks after childbirth, many women have emotions that change from happy to sad. You may find it hard to sleep. You may cry a lot. This is called the \"baby blues. \" These overwhelming emotions often go away within a couple of days or weeks. But it's important to discuss your feelings with your doctor. It is easy to get too tired and overwhelmed during the first weeks after childbirth. Don't try to do too much. Get rest whenever you can, accept help from others, and eat well and drink plenty of fluids. In the first couple of weeks after giving birth, your doctor or midwife may want to check in with you and make a plan for any follow-up care you may need. You will likely have a complete postpartum visit in the first 3 months after delivery. At that time, your doctor or midwife will check on your recovery from childbirth. He or she will also see how you are doing with your emotions and talk about your concerns or questions. Follow-up care is a key part of your treatment and safety. Be sure to make and go to all appointments, and call your doctor if you are having problems. It's also a good idea to know your test results and keep a list of the medicines you take. How can you care for yourself at home? · Sleep or rest when your baby sleeps. · Get help with household chores from family or friends, if you can. Do not try to do it all yourself. · If you have hemorrhoids or swelling or pain around the opening of your vagina, try using cold and heat.  You can put ice or a cold pack on the area for 10 to 20 minutes at a time. Put a thin cloth between the ice and your skin. Also try sitting in a few inches of warm water (sitz bath) 3 times a day and after bowel movements. · Take pain medicines exactly as directed. ? If the doctor gave you a prescription medicine for pain, take it as prescribed. ? If you are not taking a prescription pain medicine, ask your doctor if you can take an over-the-counter medicine. · Eat more fiber to avoid constipation. Include foods such as whole-grain breads and cereals, raw vegetables, raw and dried fruits, and beans. · Drink plenty of fluids, enough so that your urine is light yellow or clear like water. If you have kidney, heart, or liver disease and have to limit fluids, talk with your doctor before you increase the amount of fluids you drink. · Do not rinse inside your vagina with fluids (douche). · If you have stitches, keep the area clean by pouring or spraying warm water over the area outside your vagina and anus after you use the toilet. · Keep a list of questions to ask your doctor or midwife. Your questions might be about:  ? Changes in your breasts, such as lumps or soreness. ? When to expect your menstrual period to start again. ? What form of birth control is best for you. ? Weight you have put on during the pregnancy. ? Exercise options. ? What foods and drinks are best for you, especially if you are breastfeeding. ? Problems you might be having with breastfeeding. ? When you can have sex. Some women may want to talk about lubricants for the vagina. ? Any feelings of sadness or restlessness that you are having. When should you call for help? Call 911 anytime you think you may need emergency care. For example, call if:    · You have thoughts of harming yourself, your baby, or another person.     · You passed out (lost consciousness).     · You have chest pain, are short of breath, or cough up blood.     · You have a seizure.    Call your doctor now or seek immediate medical care if:    · Your vaginal bleeding seems to be getting heavier.     · You are dizzy or lightheaded, or you feel like you may faint.     · You have a fever.     · You have new or more belly pain.     · You have symptoms of a blood clot in your leg (called a deep vein thrombosis), such as:  ? Pain in the calf, back of the knee, thigh, or groin. ? Redness and swelling in your leg or groin.     · You have signs of preeclampsia, such as:  ? Sudden swelling of your face, hands, or feet. ? New vision problems (such as dimness, blurring, or seeing spots). ? A severe headache. Watch closely for changes in your health, and be sure to contact your doctor if:    · You have new or worse vaginal discharge.     · You feel sad or depressed.     · You are having problems with your breasts or breastfeeding. Where can you learn more? Go to http://www.gray.com/  Enter L657 in the search box to learn more about \"Postpartum: Care Instructions. \"  Current as of: February 11, 2020               Content Version: 12.6  © 2584-0965 [employer]. Care instructions adapted under license by [employer] (which disclaims liability or warranty for this information). If you have questions about a medical condition or this instruction, always ask your healthcare professional. [address] any warranty or liability for your use of this information.

## 2021-03-03 ENCOUNTER — TELEPHONE (OUTPATIENT)
Dept: NEUROLOGY | Age: 31
End: 2021-03-03

## 2021-03-17 ENCOUNTER — OFFICE VISIT (OUTPATIENT)
Dept: NEUROLOGY | Age: 31
End: 2021-03-17
Payer: COMMERCIAL

## 2021-03-17 VITALS
RESPIRATION RATE: 16 BRPM | HEART RATE: 118 BPM | TEMPERATURE: 97.5 F | SYSTOLIC BLOOD PRESSURE: 138 MMHG | WEIGHT: 154 LBS | DIASTOLIC BLOOD PRESSURE: 97 MMHG | BODY MASS INDEX: 29.07 KG/M2 | HEIGHT: 61 IN

## 2021-03-17 DIAGNOSIS — G44.59 OTHER COMPLICATED HEADACHE SYNDROME: Primary | ICD-10-CM

## 2021-03-17 DIAGNOSIS — R42 DIZZY: ICD-10-CM

## 2021-03-17 PROCEDURE — 99204 OFFICE O/P NEW MOD 45 MIN: CPT | Performed by: PSYCHIATRY & NEUROLOGY

## 2021-03-17 NOTE — PROGRESS NOTES
[facility] Neurology Clinics and [address] at [facility] Neurology Clinics at [address] [facility], [address] [facility] [address] [address] [facility], [address] [place] [address]  [phone] Office  [phone] Facsimile           Referring: Dr. Ezequiel Alaniz    Chief Complaint   Patient presents with    Headache     started getting them approx 2 days after being discharged from hospital after having her babay/ baby is 11 weeks old    21-year-old right-handed lady presents today for initial neurologic consultation for what she calls daily headaches starting after January 18, 2021. She provides her own history. She delivered her second child on January 18 of this year. No complications with that. About 2-3 days after she was discharged from the hospital she began having headaches. They are left parietal and at times quite intense. They are daily. Sometimes she will awaken with them. They would last for hours. Over-the-counter medications have not helped. Fioricet has not helped. She does have some associated photophobia phonophobia and nausea when they become quite intense. They tend to come and go throughout the day. They are described as sharp pulsating and throbbing. At first she thought it was just lack of sleep with having a new baby or hormonal changes from delivering but it has been persistent. No positional component to it. She has never been a headache lady. She would get a rare headache here there but never had history of migraine or any other type of headache syndrome. She has not had any focal deficits. No numbness or tingling. No change in her vision. She has felt dizzy described as lightheadedness. No loss of consciousness. No clotting disorders in the family. No history of miscarriage.     Discharge summary from Dr. Ezequiel Alaniz dated January 2021 where she delivered an 8 pound 8 ounce male infant via vaginal birth after  section on 2021. Discharge medications included Motrin Nexium prenatal vitamins and Adderall. Follow-up note with Dr. Dora Palacios on  where the patient came in for postpartum evaluation was complaining of pounding headaches x6 weeks not relieved with Tylenol. She was given Fioricet and referred for neurologic evaluation. Past Medical History:   Diagnosis Date    Abnormal Papanicolaou smear of cervix     Depression     Psychiatric problem     ADD       Past Surgical History:   Procedure Laterality Date    HX  SECTION      HX COLPOSCOPY  2016    neg path    HX OTHER SURGICAL      Guayanilla teeth    HX WISDOM TEETH EXTRACTION         Current Outpatient Medications   Medication Sig Dispense Refill    norethindrone (MICRONOR) 0.35 mg tab Take 1 Tab by mouth daily. 3 Package 1    ibuprofen (MOTRIN) 800 mg tablet Take 1 Tab by mouth every six (6) hours as needed for Pain. 60 Tab 0    dextroamphetamine-amphetamine (ADDERALL) 20 mg tablet TAKE 1 TABLET BY MOUTH TWICE A DAY  0    butalbital-acetaminophen-caffeine (FIORICET, ESGIC) -40 mg per tablet Take 1 Tab by mouth every four (4) hours as needed for Headache. 15 Tab 0    esomeprazole (NexIUM) 20 mg capsule Take  by mouth daily.  prenatal vitamins without calcium #5-iron-FA (PRENATAL-U) 106. 5-1 mg cap capsule Take 1 Cap by mouth daily.           Allergies   Allergen Reactions    Sulfa (Sulfonamide Antibiotics) Rash       Social History     Tobacco Use    Smoking status: Never Smoker    Smokeless tobacco: Never Used   Substance Use Topics    Alcohol use: No    Drug use: No       Family History   Problem Relation Age of Onset    Hypertension Mother     Lupus Sister     Breast Cancer Maternal Grandmother         Breast, kidney and bone CA       Examination  Visit Vitals  BP (!) 138/97 (BP 1 Location: Left arm, BP Patient Position: Sitting)   Pulse (!) 118   Temp 97.5 °F (36.4 °C)   Resp 16   Ht 5' 1\" (1.549 m)   Wt 69.9 kg (154 lb)   BMI 29.10 kg/m²     Neurologically, she is awake, alert, and oriented with normal speech and language. Her cognition is normal.    Intact cranial nerves 2-12. No nystagmus. Disk margins are not well seen she has normal bulk and tone. She has no abnormal movement. She has no pronation or drift. She generates full strength in the upper and lower extremities to direct confrontational testing. Reflexes are symmetrical in the upper and lower extremities bilaterally. No pathologic reflexes are elicited. Finger nose finger and rapid alternating movements are normal.  Steady gait. Impression/Plan  54-year-old lady 8 weeks postpartum with 8 weeks of ongoing persistent headache  Need to exclude central venous sinus thrombosis versus other anatomic abnormality that would be cause of secondary headache syndrome. She did have epidural and certainly persistent headache from CSF leak is in the differential although her clinical description does not meet with such and that the headache is not positional.  MRI of the brain  MRV    Follow-up after    Velta Burkitt, MD          This note was created using voice recognition software. Despite editing, there may be syntax errors.

## 2021-03-23 ENCOUNTER — HOSPITAL ENCOUNTER (OUTPATIENT)
Dept: MRI IMAGING | Age: 31
Discharge: HOME | End: 2021-03-23
Attending: PSYCHIATRY & NEUROLOGY
Payer: COMMERCIAL

## 2021-03-23 VITALS — WEIGHT: 151 LBS | BODY MASS INDEX: 28.53 KG/M2

## 2021-03-23 DIAGNOSIS — G44.59 OTHER COMPLICATED HEADACHE SYNDROME: ICD-10-CM

## 2021-03-23 PROCEDURE — 70551 MRI BRAIN STEM W/O DYE: CPT

## 2021-03-23 RX ORDER — GADOTERATE MEGLUMINE 376.9 MG/ML
13 INJECTION INTRAVENOUS
Status: DISCONTINUED | OUTPATIENT
Start: 2021-03-23 | End: 2021-03-24 | Stop reason: HOSPADM

## 2021-03-24 ENCOUNTER — TELEPHONE (OUTPATIENT)
Dept: NEUROLOGY | Age: 31
End: 2021-03-24

## 2021-03-24 NOTE — TELEPHONE ENCOUNTER
----- Message from Iris Ramirez sent at 3/24/2021 10:39 AM EDT -----  Regarding: Dr. Oskar Pizarro  General Message/Vendor Calls    Caller's first and last name: Pt      Reason for call: MRI results      Callback required yes/no and why: Yes      Best contact number(s):[phone]      Details to clarify the request: Pt had MRI done yesterday and is scheduled to f/up with Dr. Ruddy Thompson on 04/14. She would like to know if someone can get in touch with her before that appt date to go over the results or if she has to wait until 04/14 to discuss them. Please advise.       Iris Ramirez

## 2021-03-24 NOTE — TELEPHONE ENCOUNTER
I explained to the patient that we don't give test results over the phone. She wants to know if he has read the mri result yet and wants to know if she needs to keep her appointment. She'd like a call back.

## 2021-03-29 ENCOUNTER — OFFICE VISIT (OUTPATIENT)
Dept: NEUROLOGY | Age: 31
End: 2021-03-29
Payer: COMMERCIAL

## 2021-03-29 VITALS
HEIGHT: 61 IN | OXYGEN SATURATION: 98 % | DIASTOLIC BLOOD PRESSURE: 79 MMHG | BODY MASS INDEX: 28.51 KG/M2 | TEMPERATURE: 97.9 F | WEIGHT: 151 LBS | HEART RATE: 86 BPM | SYSTOLIC BLOOD PRESSURE: 125 MMHG | RESPIRATION RATE: 18 BRPM

## 2021-03-29 DIAGNOSIS — G43.011 INTRACTABLE MIGRAINE WITHOUT AURA AND WITH STATUS MIGRAINOSUS: Primary | ICD-10-CM

## 2021-03-29 PROCEDURE — 99214 OFFICE O/P EST MOD 30 MIN: CPT | Performed by: PSYCHIATRY & NEUROLOGY

## 2021-03-29 RX ORDER — FREMANEZUMAB-VFRM 225 MG/1.5ML
1.5 INJECTION SUBCUTANEOUS
Qty: 1.5 ML | Refills: 3 | Status: SHIPPED | OUTPATIENT
Start: 2021-03-29

## 2021-03-29 RX ORDER — RIMEGEPANT SULFATE 75 MG/75MG
TABLET, ORALLY DISINTEGRATING ORAL
Qty: 8 TAB | Refills: 3 | Status: SHIPPED | OUTPATIENT
Start: 2021-03-29 | End: 2022-10-20

## 2021-03-29 NOTE — PROGRESS NOTES
[facility] Neurology Clinics and [address] at [facility] Neurology Clinics at [address] [facility], [address] [facility] [address] [facility], [address] [place] [address]   [phone]              Chief Complaint   Patient presents with    Results    Headache     Current Outpatient Medications   Medication Sig Dispense Refill    fremanezumab-vfrm (Ajovy Autoinjector) 225 mg/1.5 mL atIn 1.5 mL by SubCUTAneous route every month. 1.5 mL 3    rimegepant (Nurtec ODT) 75 mg disintegrating tablet 1 at migraine onset  Max 1 tab/24 hours 8 Tab 3    norethindrone (MICRONOR) 0.35 mg tab Take 1 Tab by mouth daily. 3 Package 1    butalbital-acetaminophen-caffeine (FIORICET, ESGIC) -40 mg per tablet Take 1 Tab by mouth every four (4) hours as needed for Headache. 15 Tab 0    ibuprofen (MOTRIN) 800 mg tablet Take 1 Tab by mouth every six (6) hours as needed for Pain. 60 Tab 0    esomeprazole (NexIUM) 20 mg capsule Take  by mouth daily.  dextroamphetamine-amphetamine (ADDERALL) 20 mg tablet TAKE 1 TABLET BY MOUTH TWICE A DAY  0    prenatal vitamins without calcium #5-iron-FA (PRENATAL-U) 106. 5-1 mg cap capsule Take 1 Cap by mouth daily. Allergies   Allergen Reactions    Sulfa (Sulfonamide Antibiotics) Rash     Social History     Tobacco Use    Smoking status: Never Smoker    Smokeless tobacco: Never Used   Substance Use Topics    Alcohol use: No    Drug use: No     79-year-old lady returns today for follow-up after recent initial neurologic consultation for daily headaches 8 weeks postpartum. We sent her for MRI and MRV of the brain. These were personally reviewed today and are unremarkable. She notes that her headaches are still unremitting. Left parietal region. Throbbing. Has some nausea. Photophobia and phonophobia.   Again never had history of my    Examination  Visit Vitals  /79 (BP 1 Location: Left upper arm, BP Patient Position: Sitting)   Pulse 86   Temp 97.9 °F (36.6 °C)   Resp 18   Ht 5' 1\" (1.549 m)   Wt 68.5 kg (151 lb)   SpO2 98%   Breastfeeding Yes   BMI 28.53 kg/m²     She is a pleasant lady. Awake alert oriented and conversant. Normal speech and lines. No ataxia    Impression/Plan  Headaches that do meet migraine criteria and she is postpartum. Discussed options  Discussed these headaches do meet migraine criteria and we discussed using migraine mitigation techniques including preventative and abortive strategies  We will start Ajovy in a customary fashion  Discussed these are IgG based molecules and in terms of breast-feeding should be acceptable in terms of excretion into breastmilk and even if they were excreted they should be burned up in the gut prior to getting into the system  Start in a customary fashion Ajovy and discussed expectations, studies etc.  Use Nurtec for abortive therapy    Follow-up in 3 months    Melvi Olmstead MD        This note was created using voice recognition software. Despite editing, there may be syntax errors.

## 2021-03-29 NOTE — PROGRESS NOTES
Chief Complaint   Patient presents with    Results    Headache     HAs no better, intensity seems to have worsened slightly

## 2021-04-23 PROBLEM — Z34.90 PREGNANCY: Status: RESOLVED | Noted: 2021-01-18 | Resolved: 2021-04-23

## 2021-04-23 PROBLEM — Z34.81 ENCOUNTER FOR SUPERVISION OF OTHER NORMAL PREGNANCY, FIRST TRIMESTER: Status: RESOLVED | Noted: 2020-06-04 | Resolved: 2021-04-23

## 2021-04-23 RX ORDER — DROSPIRENONE AND ETHINYL ESTRADIOL 0.03MG-3MG
KIT ORAL
Qty: 1 PACKAGE | Refills: 2 | Status: SHIPPED | OUTPATIENT
Start: 2021-04-23 | End: 2021-07-13

## 2021-07-13 ENCOUNTER — TELEPHONE (OUTPATIENT)
Dept: OBGYN CLINIC | Age: 31
End: 2021-07-13

## 2021-07-13 RX ORDER — DROSPIRENONE AND ETHINYL ESTRADIOL 0.03MG-3MG
KIT ORAL
Qty: 1 PACKAGE | Refills: 1 | Status: SHIPPED | OUTPATIENT
Start: 2021-07-13 | End: 2021-08-18 | Stop reason: SDUPTHER

## 2021-07-13 NOTE — TELEPHONE ENCOUNTER
Patient of [address] [place] [address] Rd    Calling to ask for refill of her Bharti. She is due annual.  Last seen for PP visit and advised annual due.     We set up AE for Wed 8/18/21 at 140 pm      publrob Dumont

## 2021-08-10 NOTE — PROGRESS NOTES
Dom Jones is a ,  27 y.o. female [address] [facility] [place] [address] whose Patient's last menstrual period was 2021. was on 2021 who presents for her annual checkup. She is having no significant problems. Having hard time losing weight and wants to check thryroid    With regard to the Gardisil vaccine, she is older than the FDA approved age to receive it. Menstrual status:    Her periods are light, moderate in flow. She is using three to five pads or tampons per day, usually regular and last 26-30 days. She denies dysmenorrhea. She reports no premenstrual symptoms. Contraception:    The current method of family planning is OCP (estrogen/progesterone) and She declines contraception and counseling. Sexual history:    She  reports being sexually active and has had partner(s) who are Male. Medical conditions:    Since her last annual GYN exam about two years ago, she has not the following changes in her health history: none. Pap and Mammogram History:    Her most recent Pap smear was 2019 normal/HPV neg    The patient has never had a mammogram.    The patient does not have a family history of breast cancer. Past Medical History:   Diagnosis Date    Abnormal Papanicolaou smear of cervix     Depression     Psychiatric problem     ADD     Past Surgical History:   Procedure Laterality Date    HX  SECTION      HX COLPOSCOPY  2016    neg path    HX OTHER SURGICAL      Shirley teeth    HX WISDOM TEETH EXTRACTION         Current Outpatient Medications   Medication Sig Dispense Refill    drospirenone-ethinyl estradioL (TOMAS) 3-0.03 mg tab TAKE 1 TABLET BY MOUTH EVERY DAY 1 Package 1    fremanezumab-vfrm (Ajovy Autoinjector) 225 mg/1.5 mL atIn 1.5 mL by SubCUTAneous route every month. 1.5 mL 3    rimegepant (Nurtec ODT) 75 mg disintegrating tablet 1 at migraine onset  Max 1 tab/24 hours 8 Tab 3    norethindrone (MICRONOR) 0.35 mg tab Take 1 Tab by mouth daily.  3 Package 1    butalbital-acetaminophen-caffeine (FIORICET, ESGIC) -40 mg per tablet Take 1 Tab by mouth every four (4) hours as needed for Headache. 15 Tab 0    ibuprofen (MOTRIN) 800 mg tablet Take 1 Tab by mouth every six (6) hours as needed for Pain. 60 Tab 0    esomeprazole (NexIUM) 20 mg capsule Take  by mouth daily.  prenatal vitamins without calcium #5-iron-FA (PRENATAL-U) 106. 5-1 mg cap capsule Take 1 Cap by mouth daily.  dextroamphetamine-amphetamine (ADDERALL) 20 mg tablet TAKE 1 TABLET BY MOUTH TWICE A DAY  0     Allergies: Sulfa (sulfonamide antibiotics)   Social History     Socioeconomic History    Marital status:      Spouse name: Not on file    Number of children: Not on file    Years of education: Not on file    Highest education level: Not on file   Occupational History    Not on file   Tobacco Use    Smoking status: Never Smoker    Smokeless tobacco: Never Used   Vaping Use    Vaping Use: Never used   Substance and Sexual Activity    Alcohol use: No    Drug use: No    Sexual activity: Yes     Partners: Male   Other Topics Concern     Service Not Asked    Blood Transfusions Not Asked    Caffeine Concern Not Asked    Occupational Exposure Not Asked    Hobby Hazards Not Asked    Sleep Concern Not Asked    Stress Concern Not Asked    Weight Concern Not Asked    Special Diet Not Asked    Back Care Not Asked    Exercise Not Asked    Bike Helmet Not Asked   [address] Not Asked    Self-Exams Not Asked   Social History Narrative    Not on file     Social Determinants of Health     Financial Resource Strain:     Difficulty of Paying Living Expenses:    Food Insecurity:     Worried About Running Out of Food in the Last Year:     Ran Out of Food in the Last Year:    Transportation Needs:     Lack of Transportation (Medical):      Lack of Transportation (Non-Medical):    Physical Activity:     Days of Exercise per Week:     Minutes of Exercise per Session:    Stress:     Feeling of Stress :    Social Connections:     Frequency of Communication with Friends and Family:     Frequency of Social Gatherings with Friends and Family:     Attends Protestant Services:     Active Member of Clubs or Organizations:     Attends Club or Organization Meetings:     Marital Status:    Intimate Partner Violence:     Fear of Current or Ex-Partner:     Emotionally Abused:     Physically Abused:     Sexually Abused: Tobacco History:  reports that she has never smoked. She has never used smokeless tobacco.  Alcohol Abuse:  reports no history of alcohol use. Drug Abuse:  reports no history of drug use.     Patient Active Problem List   Diagnosis Code   (none) - all problems resolved or deleted       Review of Systems - History obtained from the patient  Constitutional: negative for weight loss, fever, night sweats  HEENT: negative for hearing loss, earache, congestion, snoring, sorethroat  CV: negative for chest pain, palpitations, edema  Resp: negative for cough, shortness of breath, wheezing  GI: negative for change in bowel habits, abdominal pain, black or bloody stools  : negative for frequency, dysuria, hematuria, vaginal discharge  MSK: negative for back pain, joint pain, muscle pain  Breast: negative for breast lumps, nipple discharge, galactorrhea  Skin :negative for itching, rash, hives  Neuro: negative for dizziness, headache, confusion, weakness  Psych: negative for anxiety, depression, change in mood  Heme/lymph: negative for bleeding, bruising, pallor    Physical Exam    Visit Vitals  /83   Wt 145 lb (65.8 kg)   LMP 08/04/2021   BMI 27.40 kg/m²       Constitutional  · Appearance: well-nourished, well developed, alert, in no acute distress    HENT  · Head and Face: appears normal    Neck  · Inspection/Palpation: normal appearance, no masses or tenderness  · Lymph Nodes: no lymphadenopathy present  · Thyroid: gland size normal, nontender, no nodules or masses present on palpation    Chest  · Respiratory Effort: breathing normal  · Auscultation: normal breath sounds    Cardiovascular  · Heart:  · Auscultation: regular rate and rhythm without murmur    Breasts  · Inspection of Breasts: breasts symmetrical, no skin changes, no discharge present, nipple appearance normal, no skin retraction present  · Palpation of Breasts and Axillae: no masses present on palpation, no breast tenderness  · Axillary Lymph Nodes: no lymphadenopathy present    Gastrointestinal  · Abdominal Examination: abdomen non-tender to palpation, normal bowel sounds, no masses present  · Liver and spleen: no hepatomegaly present, spleen not palpable  · Hernias: no hernias identified    Genitourinary  · External Genitalia: normal appearance for age, no discharge present, no tenderness present, no inflammatory lesions present, no masses present, no atrophy present  · Vagina: normal vaginal vault without central or paravaginal defects, no discharge present, no inflammatory lesions present, no masses present  · Bladder: non-tender to palpation  · Urethra: appears normal  · Cervix: normal   · Uterus: normal size, shape and consistency  · Adnexa: no adnexal tenderness present, no adnexal masses present  · Perineum: perineum within normal limits, no evidence of trauma, no rashes or skin lesions present  · Anus: anus within normal limits, no hemorrhoids present  · Inguinal Lymph Nodes: no lymphadenopathy present    Skin  · General Inspection: no rash, no lesions identified    Neurologic/Psychiatric  · Mental Status:  · Orientation: grossly oriented to person, place and time  · Mood and Affect: mood normal, affect appropriate    . Assessment:  Routine gynecologic examination  Her current medical status is satisfactory with no evidence of significant gynecologic issues.     Plan:  Counseled re: diet, exercise, healthy lifestyle  Return for yearly wellness visits  Cont OCP  Check TSH

## 2021-08-18 ENCOUNTER — OFFICE VISIT (OUTPATIENT)
Dept: OBGYN CLINIC | Age: 31
End: 2021-08-18
Payer: COMMERCIAL

## 2021-08-18 VITALS — SYSTOLIC BLOOD PRESSURE: 131 MMHG | WEIGHT: 145 LBS | DIASTOLIC BLOOD PRESSURE: 83 MMHG | BODY MASS INDEX: 27.4 KG/M2

## 2021-08-18 DIAGNOSIS — Z01.419 ENCOUNTER FOR GYNECOLOGICAL EXAMINATION (GENERAL) (ROUTINE) WITHOUT ABNORMAL FINDINGS: Primary | ICD-10-CM

## 2021-08-18 DIAGNOSIS — R63.5 WEIGHT GAIN: ICD-10-CM

## 2021-08-18 PROCEDURE — 99395 PREV VISIT EST AGE 18-39: CPT | Performed by: OBSTETRICS & GYNECOLOGY

## 2021-08-18 RX ORDER — DROSPIRENONE AND ETHINYL ESTRADIOL 0.03MG-3MG
KIT ORAL
Qty: 3 PACKAGE | Refills: 4 | Status: SHIPPED | OUTPATIENT
Start: 2021-08-18 | End: 2022-09-12

## 2021-08-19 LAB — TSH SERPL DL<=0.05 MIU/L-ACNC: 1.43 UIU/ML (ref 0.36–3.74)

## 2022-09-12 ENCOUNTER — TELEPHONE (OUTPATIENT)
Dept: OBGYN CLINIC | Age: 32
End: 2022-09-12

## 2022-09-12 RX ORDER — DROSPIRENONE AND ETHINYL ESTRADIOL 0.03MG-3MG
KIT ORAL
Qty: 1 DOSE PACK | Refills: 0 | Status: SHIPPED | OUTPATIENT
Start: 2022-09-12 | End: 2022-10-20

## 2022-10-14 NOTE — PROGRESS NOTES
Gaye Koyanagi is a ,  34 y.o. female [address] [facility] whose Patient's last menstrual period was 10/06/2022 (exact date). was on 10/6/2022 who presents for her annual checkup. She is having no significant problems. She just stopped taking the birth control pills and wants to see how she feels. She was having headaches on her period      With regard to the Gardisil vaccine, she has not received it. Menstrual status:    Her periods are light in flow. She is using one to two pads or tampons per day, usually regular and last 26-30 days. She denies dysmenorrhea. She reports no premenstrual symptoms. Contraception:    The current method of family planning is none and withdrawal    Sexual history:    She  reports being sexually active and has had partner(s) who are male. Medical conditions:    Since her last annual GYN exam about one year ago, she has not the following changes in her health history: none. Pap and Mammogram History:    Her most recent Pap smear was 2019 normal/HPV neg    The patient has never had a mammogram.    The patient does not have a family history of breast cancer. Past Medical History:   Diagnosis Date    Abnormal Papanicolaou smear of cervix     Depression     Psychiatric problem     ADD     Past Surgical History:   Procedure Laterality Date    HX  SECTION      HX COLPOSCOPY  2016    neg path    HX OTHER SURGICAL      [place] teeth    HX WISDOM TEETH EXTRACTION         Current Outpatient Medications   Medication Sig Dispense Refill    dextroamphetamine-amphetamine (ADDERALL) 20 mg tablet TAKE 1 TABLET BY MOUTH TWICE A DAY  0    cyanocobalamin, vitamin B-12, (VITAMIN B-12 PO) Take  by mouth. fremanezumab-vfrm (Ajovy Autoinjector) 225 mg/1.5 mL atIn 1.5 mL by SubCUTAneous route every month.  1.5 mL 3    butalbital-acetaminophen-caffeine (FIORICET, ESGIC) -40 mg per tablet Take 1 Tab by mouth every four (4) hours as needed for Headache. 15 Tab 0    ibuprofen (MOTRIN) 800 mg tablet Take 1 Tab by mouth every six (6) hours as needed for Pain. 60 Tab 0     Allergies: Sulfa (sulfonamide antibiotics)   Social History     Socioeconomic History    Marital status:      Spouse name: Not on file    Number of children: Not on file    Years of education: Not on file    Highest education level: Not on file   Occupational History    Not on file   Tobacco Use    Smoking status: Never    Smokeless tobacco: Never   Vaping Use    Vaping Use: Never used   Substance and Sexual Activity    Alcohol use: No    Drug use: No    Sexual activity: Yes     Partners: Male   Other Topics Concern     Service Not Asked    Blood Transfusions Not Asked    Caffeine Concern Not Asked    Occupational Exposure Not Asked    Hobby Hazards Not Asked    Sleep Concern Not Asked    Stress Concern Not Asked    Weight Concern Not Asked    Special Diet Not Asked    Back Care Not Asked    Exercise Not Asked    Bike Helmet Not Asked    Seat Belt Not Asked    Self-Exams Not Asked   Social History Narrative    Not on file     Social Determinants of Health     Financial Resource Strain: Not on file   Food Insecurity: Not on file   Transportation Needs: Not on file   Physical Activity: Not on file   Stress: Not on file   Social Connections: Not on file   Intimate Partner Violence: Not on file   Housing Stability: Not on file     Tobacco History:  reports that she has never smoked. She has never used smokeless tobacco.  Alcohol Abuse:  reports no history of alcohol use. Drug Abuse:  reports no history of drug use.     Patient Active Problem List   Diagnosis Code   (none) - all problems resolved or deleted       Review of Systems - History obtained from the patient  Constitutional: negative for weight loss, fever, night sweats  HEENT: negative for hearing loss, earache, congestion, snoring, sorethroat  CV: negative for chest pain, palpitations, edema  Resp: negative for cough, shortness of breath, wheezing  GI: negative for change in bowel habits, abdominal pain, black or bloody stools  : negative for frequency, dysuria, hematuria, vaginal discharge  MSK: negative for back pain, joint pain, muscle pain  Breast: negative for breast lumps, nipple discharge, galactorrhea  Skin :negative for itching, rash, hives  Neuro: negative for dizziness, headache, confusion, weakness  Psych: negative for anxiety, depression, change in mood  Heme/lymph: negative for bleeding, bruising, pallor    Physical Exam    Visit Vitals  /89   Wt 144 lb (65.3 kg)   LMP 10/06/2022 (Exact Date)   BMI 27.21 kg/m²       Constitutional  Appearance: well-nourished, well developed, alert, in no acute distress    HENT  Head and Face: appears normal    Neck  Inspection/Palpation: normal appearance, no masses or tenderness  Lymph Nodes: no lymphadenopathy present  Thyroid: gland size normal, nontender, no nodules or masses present on palpation    Chest  Respiratory Effort: breathing normal  Auscultation: normal breath sounds    Cardiovascular  Heart:   Auscultation: regular rate and rhythm without murmur    Breasts  Inspection of Breasts: breasts symmetrical, no skin changes, no discharge present, nipple appearance normal, no skin retraction present  Palpation of Breasts and Axillae: no masses present on palpation, no breast tenderness  Axillary Lymph Nodes: no lymphadenopathy present    Gastrointestinal  Abdominal Examination: abdomen non-tender to palpation, normal bowel sounds, no masses present  Liver and spleen: no hepatomegaly present, spleen not palpable  Hernias: no hernias identified    Genitourinary  External Genitalia: normal appearance for age, no discharge present, no tenderness present, no inflammatory lesions present, no masses present, no atrophy present  Vagina: normal vaginal vault without central or paravaginal defects, no discharge present, no inflammatory lesions present, no masses present  Bladder: non-tender to palpation  Urethra: appears normal  Cervix: normal   Uterus: normal size, shape and consistency  Adnexa: no adnexal tenderness present, no adnexal masses present  Perineum: perineum within normal limits, no evidence of trauma, no rashes or skin lesions present  Anus: anus within normal limits, no hemorrhoids present  Inguinal Lymph Nodes: no lymphadenopathy present    Skin  General Inspection: no rash, no lesions identified    Neurologic/Psychiatric  Mental Status:  Orientation: grossly oriented to person, place and time  Mood and Affect: mood normal, affect appropriate    . Assessment:  Routine gynecologic examination  Her current medical status is satisfactory with no evidence of significant gynecologic issues. Plan:  Counseled re: diet, exercise, healthy lifestyle  Return for yearly wellness visits  Pt counseled regarding co-testing for high risk HPV with pap  May want conception. Take vitamins.   Discussed Mirena

## 2022-10-20 ENCOUNTER — OFFICE VISIT (OUTPATIENT)
Dept: OBGYN CLINIC | Age: 32
End: 2022-10-20
Payer: COMMERCIAL

## 2022-10-20 VITALS — BODY MASS INDEX: 27.21 KG/M2 | DIASTOLIC BLOOD PRESSURE: 89 MMHG | SYSTOLIC BLOOD PRESSURE: 128 MMHG | WEIGHT: 144 LBS

## 2022-10-20 DIAGNOSIS — Z01.419 ENCOUNTER FOR GYNECOLOGICAL EXAMINATION (GENERAL) (ROUTINE) WITHOUT ABNORMAL FINDINGS: Primary | ICD-10-CM

## 2022-10-20 PROCEDURE — 99395 PREV VISIT EST AGE 18-39: CPT | Performed by: OBSTETRICS & GYNECOLOGY

## 2022-10-25 LAB
CYTOLOGIST CVX/VAG CYTO: NORMAL
CYTOLOGY CVX/VAG DOC CYTO: NORMAL
CYTOLOGY CVX/VAG DOC THIN PREP: NORMAL
DX ICD CODE: NORMAL
HPV I/H RISK 4 DNA CVX QL PROBE+SIG AMP: NEGATIVE
Lab: NORMAL
OTHER STN SPEC: NORMAL
STAT OF ADQ CVX/VAG CYTO-IMP: NORMAL

## 2022-11-30 RX ORDER — DROSPIRENONE AND ETHINYL ESTRADIOL 0.03MG-3MG
KIT ORAL
Qty: 3 DOSE PACK | Refills: 3 | Status: SHIPPED | OUTPATIENT
Start: 2022-11-30

## 2023-03-27 ENCOUNTER — TRANSCRIBE ORDER (OUTPATIENT)
Dept: SCHEDULING | Age: 33
End: 2023-03-27

## 2023-03-27 DIAGNOSIS — R22.2 MASS IN CHEST: ICD-10-CM

## 2023-03-27 DIAGNOSIS — R22.30 SLAP NODULES OF WRIST: Primary | ICD-10-CM

## 2023-04-04 ENCOUNTER — HOSPITAL ENCOUNTER (OUTPATIENT)
Dept: MRI IMAGING | Age: 33
End: 2023-04-04
Attending: ORTHOPAEDIC SURGERY
Payer: COMMERCIAL

## 2023-04-04 PROCEDURE — A9576 INJ PROHANCE MULTIPACK: HCPCS | Performed by: RADIOLOGY

## 2023-04-04 PROCEDURE — 74011250636 HC RX REV CODE- 250/636: Performed by: RADIOLOGY

## 2023-04-04 PROCEDURE — 73223 MRI JOINT UPR EXTR W/O&W/DYE: CPT

## 2023-04-04 RX ADMIN — GADOTERIDOL 12 ML: 279.3 INJECTION, SOLUTION INTRAVENOUS at 16:34

## 2023-05-10 NOTE — PROGRESS NOTES
NOTIFICATION OF ADMISSION DISCHARGE   This is a Notification of Discharge from [address]  Please be advised that this patient has been discharge from our facility  Below you will find the admission and discharge date and time including the patient’s disposition  UTILIZATION REVIEW CONTACT:  Mery Willett MA  Utilization   Network Utilization Review Department  Phone: [phone] x carefully listen to the prompts  All voicemails are confidential   Email: [email or web address]     ADMISSION INFORMATION  PRESENTATION DATE: 5/7/2023 11:29 PM  OBERVATION ADMISSION DATE:   INPATIENT ADMISSION DATE: 5/8/23  4:28 AM   DISCHARGE DATE: 5/9/2023  3:20 PM   DISPOSITION:ED Dismiss - Never Arrived    IMPORTANT INFORMATION:  Send all requests for admission clinical reviews, approved or denied determinations and any other requests to dedicated fax number below belonging to the campus where the patient is receiving treatment   List of dedicated fax numbers:  [address] DENIALS (Administrative/Medical Necessity) [phone]   [address] (Maternity/NICU/Pediatrics) [phone]   [facility] [phone]   [facility] 87 [phone]   Natanaela Cristiano 134 [phone]   220 [facility] [phone] 90 [facility] [phone]   [address] [place], [place] [address] 119 [phone]   [facility]  [phone]   [facility] [phone]   412 [facility] [address] [facility] [phone] Post-Operative Day Number 2 Progress Note Patient doing well post-op day 2 from  delivery without significant complaints. Pain controlled on current medication. Voiding without difficulty, normal lochia. Vitals:  Patient Vitals for the past 8 hrs: 
 BP Temp Pulse Resp  
18 1513 136/61 98.2 °F (36.8 °C) 99 18 Temp (24hrs), Av.5 °F (36.9 °C), Min:98.2 °F (36.8 °C), Max:98.8 °F (37.1 °C) Vital signs stable, afebrile. Exam:  Patient without distress. Abdomen soft, fundus firm at level of umbilicus, nontender. Incision dry and clean without erythema. Lower extremities are negative for swelling, cords or tenderness. Labs: No results found for this or any previous visit (from the past 24 hour(s)). Gestational thrombocytopenia Assessment and Plan:  Patient appears to be having uncomplicated post- course. Continue routine post-op care and maternal education. Recheck plts in am - if <100K see me in office this week to recheck

## 2023-05-21 RX ORDER — BUTALBITAL, ACETAMINOPHEN AND CAFFEINE 50; 325; 40 MG/1; MG/1; MG/1
1 TABLET ORAL EVERY 4 HOURS PRN
COMMUNITY
Start: 2021-03-01 | End: 2023-05-24

## 2023-05-21 RX ORDER — DEXTROAMPHETAMINE SACCHARATE, AMPHETAMINE ASPARTATE, DEXTROAMPHETAMINE SULFATE AND AMPHETAMINE SULFATE 5; 5; 5; 5 MG/1; MG/1; MG/1; MG/1
1 TABLET ORAL 2 TIMES DAILY
COMMUNITY
Start: 2019-06-06

## 2023-05-21 RX ORDER — IBUPROFEN 800 MG/1
800 TABLET ORAL EVERY 6 HOURS PRN
COMMUNITY
Start: 2021-01-19 | End: 2023-05-30

## 2023-05-21 RX ORDER — DROSPIRENONE AND ETHINYL ESTRADIOL 0.03MG-3MG
1 KIT ORAL DAILY
COMMUNITY
Start: 2022-11-30 | End: 2023-05-24

## 2023-05-21 RX ORDER — FREMANEZUMAB-VFRM 225 MG/1.5ML
1.5 INJECTION SUBCUTANEOUS
COMMUNITY
Start: 2021-03-29 | End: 2023-05-24 | Stop reason: CLARIF

## 2023-05-24 ENCOUNTER — INITIAL CONSULT (OUTPATIENT)
Age: 33
End: 2023-05-24
Payer: COMMERCIAL

## 2023-05-24 ENCOUNTER — CLINICAL DOCUMENTATION (OUTPATIENT)
Facility: HOSPITAL | Age: 33
End: 2023-05-24

## 2023-05-24 VITALS
DIASTOLIC BLOOD PRESSURE: 84 MMHG | SYSTOLIC BLOOD PRESSURE: 125 MMHG | BODY MASS INDEX: 27.23 KG/M2 | HEART RATE: 95 BPM | OXYGEN SATURATION: 97 % | RESPIRATION RATE: 18 BRPM | HEIGHT: 61 IN | TEMPERATURE: 99 F | WEIGHT: 144.2 LBS

## 2023-05-24 DIAGNOSIS — Z79.899 HIGH RISK MEDICATION USE: ICD-10-CM

## 2023-05-24 DIAGNOSIS — Z80.41 FAMILY HISTORY OF OVARIAN CANCER: ICD-10-CM

## 2023-05-24 DIAGNOSIS — D48.1 DESMOID TUMOR: Primary | ICD-10-CM

## 2023-05-24 DIAGNOSIS — D48.1 DESMOID TUMOR: ICD-10-CM

## 2023-05-24 PROCEDURE — 99205 OFFICE O/P NEW HI 60 MIN: CPT | Performed by: INTERNAL MEDICINE

## 2023-05-24 RX ORDER — TRAMADOL HYDROCHLORIDE 50 MG/1
TABLET ORAL
COMMUNITY
Start: 2023-05-19

## 2023-05-24 ASSESSMENT — PATIENT HEALTH QUESTIONNAIRE - PHQ9
1. LITTLE INTEREST OR PLEASURE IN DOING THINGS: 0
SUM OF ALL RESPONSES TO PHQ QUESTIONS 1-9: 0
SUM OF ALL RESPONSES TO PHQ QUESTIONS 1-9: 0
SUM OF ALL RESPONSES TO PHQ9 QUESTIONS 1 & 2: 0
2. FEELING DOWN, DEPRESSED OR HOPELESS: 0
SUM OF ALL RESPONSES TO PHQ QUESTIONS 1-9: 0
SUM OF ALL RESPONSES TO PHQ QUESTIONS 1-9: 0

## 2023-05-24 NOTE — PROGRESS NOTES
Chief Complaint   Patient presents with    New Patient           Vitals:    05/24/23 1441   BP: 125/84   Pulse: 95   Resp: 18   Temp: 99 °F (37.2 °C)   SpO2: 97%            1. Have you been to the ER, urgent care clinic since your last visit? Hospitalized since your last visit? New Patient  2. Have you seen or consulted any other health care providers outside of the [address] [place] [address] since your last visit? Include any pap smears or colon screening.  New Patient

## 2023-05-24 NOTE — PROGRESS NOTES
Ms Toñito Ramirez is a 28year old female with a desmoid tumor who was recently prescribed sorafenib (Nexavar) by a [facility] provider that she has not yet started. Patient has expressed desire to become pregnant and requested information on the effects of sorafenib on pregnancy. Specifically, she inquired about the timing after she finishes the last dose before she would be allowed to conceive. Per package insert for sorafenib:     \"Advise females of reproductive potential to use effective contraception during treatment and for 6 months following the last dose of NEXAVAR. \"    [email or web address]. pdf    Explained this to patient who became emotional but expressed understanding. She had no other questions or concerns.      Thank you,  Lilliana Doty, PharmD, [facility]    Program: Medical Group  CPA in place:  Yes  Recommendation Provided To: Provider: 1 via Verbally to provider and Patient/Caregiver: 1 via In person  Intervention Detail: New Rx: 1, reason: KALYANI, Needs Additional Therapy, Patient Preference  Intervention Accepted By: Provider: 1 and Patient/Caregiver: 1  Time Spent (min): 15

## 2023-05-24 NOTE — PROGRESS NOTES
[facility] at [place] [address] [facility]  Gayle Mandril: [phone]  F: [phone] Patient ID  Name: Beatriz Dial  YOB: 1990  MRN: [id number]  Referring Provider:   No referring provider defined for this encounter. Primary Care Provider:   Laura Vasquez MD       HEMATOLOGY/MEDICAL ONCOLOGY  NOTE     Reason for Evaluation:     Chief Complaint   Patient presents with    New Patient     Oncology History:   Please review original records for clinical decision making. This summary highlights focused aspects of patient's ongoing care and may have a recurring section in notes with either updates or remain unchanged as a longitudinal care summary. 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 Electronically signed by Merle Boyce MD on 4/20/2023 at  2:01 PM     <>STAGING<>  Cancer Staging   No matching staging information was found for the patient. <>GOALS OF CARE<>PALLIATIVE  <>CURRENT TREATMENT<>Plan for Sorafenib 400mg po nightly per Dr. Corby Grey at [facility]  <>PRIOR TREATMENT>n/a  <>BIOMARKERS<>n/a  MRI SHOULDER LEFT W WO CONTRAST 04/04/2023 FINDINGS:  Bone marrow: No fracture, dislocation, or marrow replacing process. No evidence  of stress reaction or periostitis. Joint fluid: Physiologic. Tendons: Intact. Muscles and soft tissue mass: And approximately 7.3 cm x 6.4 cm x 4.2 cm  amorphous heterogeneously T2 hyperintense and T1 mildly hyperintense enhancing  expansile lesion predominantly involving the rhomboid minor, levator scapulae,  and trapezius muscles (12-29, 14-10); subtle perilesional edema. Neurovascular bundles: Within normal limits. Articular cartilage: Not well evaluated.  Impression Amorphous expansile enhancing mass involving

## 2023-05-25 LAB
ALBUMIN SERPL-MCNC: 3.9 G/DL (ref 3.5–5)
ALBUMIN/GLOB SERPL: 1.2 (ref 1.1–2.2)
ALP SERPL-CCNC: 46 U/L (ref 45–117)
ALT SERPL-CCNC: 32 U/L (ref 12–78)
ANION GAP SERPL CALC-SCNC: 4 MMOL/L (ref 5–15)
AST SERPL-CCNC: 23 U/L (ref 15–37)
BASOPHILS # BLD: 0 K/UL (ref 0–0.1)
BASOPHILS NFR BLD: 1 % (ref 0–1)
BILIRUB SERPL-MCNC: 0.3 MG/DL (ref 0.2–1)
BUN SERPL-MCNC: 10 MG/DL (ref 6–20)
BUN/CREAT SERPL: 14 (ref 12–20)
CALCIUM SERPL-MCNC: 9.7 MG/DL (ref 8.5–10.1)
CHLORIDE SERPL-SCNC: 106 MMOL/L (ref 97–108)
CO2 SERPL-SCNC: 28 MMOL/L (ref 21–32)
CREAT SERPL-MCNC: 0.7 MG/DL (ref 0.55–1.02)
DIFFERENTIAL METHOD BLD: ABNORMAL
EOSINOPHIL # BLD: 0 K/UL (ref 0–0.4)
EOSINOPHIL NFR BLD: 0 % (ref 0–7)
ERYTHROCYTE [DISTWIDTH] IN BLOOD BY AUTOMATED COUNT: 14.6 % (ref 11.5–14.5)
GLOBULIN SER CALC-MCNC: 3.2 G/DL (ref 2–4)
GLUCOSE SERPL-MCNC: 91 MG/DL (ref 65–100)
HCT VFR BLD AUTO: 36.3 % (ref 35–47)
HGB BLD-MCNC: 11 G/DL (ref 11.5–16)
IMM GRANULOCYTES # BLD AUTO: 0 K/UL (ref 0–0.04)
IMM GRANULOCYTES NFR BLD AUTO: 0 % (ref 0–0.5)
LYMPHOCYTES # BLD: 1.7 K/UL (ref 0.8–3.5)
LYMPHOCYTES NFR BLD: 32 % (ref 12–49)
MCH RBC QN AUTO: 26.6 PG (ref 26–34)
MCHC RBC AUTO-ENTMCNC: 30.3 G/DL (ref 30–36.5)
MCV RBC AUTO: 87.9 FL (ref 80–99)
MONOCYTES # BLD: 0.5 K/UL (ref 0–1)
MONOCYTES NFR BLD: 9 % (ref 5–13)
NEUTS SEG # BLD: 3.2 K/UL (ref 1.8–8)
NEUTS SEG NFR BLD: 58 % (ref 32–75)
NRBC # BLD: 0 K/UL (ref 0–0.01)
NRBC BLD-RTO: 0 PER 100 WBC
PLATELET # BLD AUTO: 176 K/UL (ref 150–400)
PMV BLD AUTO: 12.2 FL (ref 8.9–12.9)
POTASSIUM SERPL-SCNC: 4.8 MMOL/L (ref 3.5–5.1)
PROT SERPL-MCNC: 7.1 G/DL (ref 6.4–8.2)
RBC # BLD AUTO: 4.13 M/UL (ref 3.8–5.2)
SODIUM SERPL-SCNC: 138 MMOL/L (ref 136–145)
TSH SERPL DL<=0.05 MIU/L-ACNC: 1.27 UIU/ML (ref 0.36–3.74)
VIT B12 SERPL-MCNC: >2000 PG/ML (ref 193–986)
WBC # BLD AUTO: 5.5 K/UL (ref 3.6–11)

## 2023-05-30 ENCOUNTER — OFFICE VISIT (OUTPATIENT)
Age: 33
End: 2023-05-30
Payer: COMMERCIAL

## 2023-05-30 ENCOUNTER — PATIENT MESSAGE (OUTPATIENT)
Age: 33
End: 2023-05-30

## 2023-05-30 ENCOUNTER — HOSPITAL ENCOUNTER (OUTPATIENT)
Facility: HOSPITAL | Age: 33
Discharge: HOME | End: 2023-06-02
Attending: INTERNAL MEDICINE
Payer: COMMERCIAL

## 2023-05-30 VITALS — SYSTOLIC BLOOD PRESSURE: 108 MMHG | WEIGHT: 148 LBS | DIASTOLIC BLOOD PRESSURE: 74 MMHG | BODY MASS INDEX: 27.96 KG/M2

## 2023-05-30 DIAGNOSIS — D48.1 DESMOID TUMOR: ICD-10-CM

## 2023-05-30 DIAGNOSIS — N92.6 IRREGULAR BLEEDING: ICD-10-CM

## 2023-05-30 DIAGNOSIS — D48.1 DESMOID TUMOR: Primary | ICD-10-CM

## 2023-05-30 PROCEDURE — 99213 OFFICE O/P EST LOW 20 MIN: CPT | Performed by: OBSTETRICS & GYNECOLOGY

## 2023-05-30 PROCEDURE — A9579 GAD-BASE MR CONTRAST NOS,1ML: HCPCS | Performed by: INTERNAL MEDICINE

## 2023-05-30 PROCEDURE — 6360000004 HC RX CONTRAST MEDICATION: Performed by: INTERNAL MEDICINE

## 2023-05-30 PROCEDURE — 73223 MRI JOINT UPR EXTR W/O&W/DYE: CPT

## 2023-05-30 RX ADMIN — GADOTERIDOL 13 ML: 279.3 INJECTION, SOLUTION INTRAVENOUS at 20:06

## 2023-05-30 NOTE — PROGRESS NOTES
OB/GYN Problem VIsit    HPI  Fareed Klein is a No obstetric history on file. ,  28 y.o. female who presents for a problem visit. She was recently diagnosed with a desmoid tumor and needs to do oral chemo. There will be a washout period for 6 months after that before she can try to get pregnant again. She found out right when she was planning to start getting pregnant. There is a question of whether this tumor is hormone responsive but she needs to have an IUD placed. She is followed by Dr. Mary Anne Broussard here at [facility] as well as a doctor at [facility]. She was advised that her chemo will cause increased bleeding. She stopped her birth control pills and the bleeding has been more irregular. Past Medical History:   Diagnosis Date    Abnormal Papanicolaou smear of cervix     Psychiatric problem     ADD     Past Surgical History:   Procedure Laterality Date     SECTION  2018    COLPOSCOPY  2016    neg path    OTHER SURGICAL HISTORY      [place] teeth    WISDOM TOOTH EXTRACTION       Social History     Occupational History    Not on file   Tobacco Use    Smoking status: Never    Smokeless tobacco: Never   Vaping Use    Vaping Use: Never used   Substance and Sexual Activity    Alcohol use: Yes     Comment: Occ. Drug use: No    Sexual activity: Not on file     Family History   Problem Relation Age of Onset    Hypertension Mother     Lupus Sister     Breast Cancer Maternal Grandmother         Breast, kidney and bone CA    Ovarian Cancer Maternal Grandmother     Colon Cancer Maternal Grandfather [de-identified]    Other Cancer Neg Hx         desmoid       Allergies   Allergen Reactions    Sulfa Antibiotics Rash     Prior to Admission medications    Medication Sig Start Date End Date Taking?  Authorizing Provider   traMADol (ULTRAM) 50 MG tablet TAKE ONE TABLET BY MOUTH EVERY SIX HOURS AS NEEDED FOR PAIN 23  Yes Historical Provider, MD   amphetamine-dextroamphetamine (ADDERALL) 20 MG tablet Take 1 tablet by mouth 2

## 2023-05-31 DIAGNOSIS — Z79.899 HIGH RISK MEDICATION USE: ICD-10-CM

## 2023-05-31 NOTE — TELEPHONE ENCOUNTER
[address] at [place]  [phone]    05/31/23 4:10 PM - Called and spoke with the patient. Patient's ID verified x 2. Advised the patient of the NP's recommendations. The patient states she is currently taking the B12 daily. Advised the NP would like her to take vitamin B12 1000 mcg daily. Advised she can continue the B6 at her current dose. The patient voiced understanding and gratitude for the call. No further questions or concerns.

## 2023-06-01 LAB — HCG UR QL: NEGATIVE

## 2023-07-01 ENCOUNTER — TELEPHONE (OUTPATIENT)
Age: 33
End: 2023-07-01

## 2023-07-01 DIAGNOSIS — K12.31 ORAL MUCOSITIS DUE TO ANTINEOPLASTIC THERAPY: Primary | ICD-10-CM

## 2023-07-01 RX ORDER — FOLIC ACID 1 MG/1
1 TABLET ORAL DAILY
Qty: 90 TABLET | Refills: 1 | Status: SHIPPED | OUTPATIENT
Start: 2023-07-01

## 2023-07-12 ENCOUNTER — OFFICE VISIT (OUTPATIENT)
Age: 33
End: 2023-07-12
Payer: COMMERCIAL

## 2023-07-12 VITALS
SYSTOLIC BLOOD PRESSURE: 141 MMHG | TEMPERATURE: 97 F | HEIGHT: 61 IN | HEART RATE: 89 BPM | OXYGEN SATURATION: 99 % | WEIGHT: 134.2 LBS | DIASTOLIC BLOOD PRESSURE: 85 MMHG | BODY MASS INDEX: 25.34 KG/M2 | RESPIRATION RATE: 18 BRPM

## 2023-07-12 DIAGNOSIS — K12.31 ORAL MUCOSITIS DUE TO ANTINEOPLASTIC THERAPY: ICD-10-CM

## 2023-07-12 DIAGNOSIS — D48.1 DESMOID TUMOR: Primary | ICD-10-CM

## 2023-07-12 DIAGNOSIS — Z79.899 HIGH RISK MEDICATION USE: ICD-10-CM

## 2023-07-12 PROCEDURE — 99214 OFFICE O/P EST MOD 30 MIN: CPT | Performed by: INTERNAL MEDICINE

## 2023-07-12 RX ORDER — LOSARTAN POTASSIUM 25 MG/1
25 TABLET ORAL DAILY
COMMUNITY

## 2023-07-12 ASSESSMENT — PATIENT HEALTH QUESTIONNAIRE - PHQ9
2. FEELING DOWN, DEPRESSED OR HOPELESS: 0
SUM OF ALL RESPONSES TO PHQ QUESTIONS 1-9: 0
SUM OF ALL RESPONSES TO PHQ9 QUESTIONS 1 & 2: 0
SUM OF ALL RESPONSES TO PHQ QUESTIONS 1-9: 0
SUM OF ALL RESPONSES TO PHQ QUESTIONS 1-9: 0
1. LITTLE INTEREST OR PLEASURE IN DOING THINGS: 0
SUM OF ALL RESPONSES TO PHQ QUESTIONS 1-9: 0

## 2023-08-24 DIAGNOSIS — C90.00 MULTIPLE MYELOMA NOT HAVING ACHIEVED REMISSION (HCC): Primary | ICD-10-CM

## 2023-08-24 DIAGNOSIS — D48.1 DESMOID TUMOR: ICD-10-CM

## 2023-09-13 DIAGNOSIS — D48.119 DESMOID TUMOR: ICD-10-CM

## 2023-09-13 LAB
ALBUMIN SERPL-MCNC: 3.8 G/DL (ref 3.5–5)
ALBUMIN/GLOB SERPL: 1.3 (ref 1.1–2.2)
ALP SERPL-CCNC: 57 U/L (ref 45–117)
ALT SERPL-CCNC: 40 U/L (ref 12–78)
ANION GAP SERPL CALC-SCNC: ABNORMAL MMOL/L (ref 5–15)
AST SERPL-CCNC: 27 U/L (ref 15–37)
BASOPHILS # BLD: 0 K/UL (ref 0–0.1)
BASOPHILS NFR BLD: 1 % (ref 0–1)
BILIRUB SERPL-MCNC: 0.5 MG/DL (ref 0.2–1)
BUN SERPL-MCNC: 14 MG/DL (ref 6–20)
BUN/CREAT SERPL: 17 (ref 12–20)
CALCIUM SERPL-MCNC: 9.3 MG/DL (ref 8.5–10.1)
CHLORIDE SERPL-SCNC: 104 MMOL/L (ref 97–108)
CO2 SERPL-SCNC: 33 MMOL/L (ref 21–32)
CREAT SERPL-MCNC: 0.82 MG/DL (ref 0.55–1.02)
DIFFERENTIAL METHOD BLD: ABNORMAL
EOSINOPHIL # BLD: 0.1 K/UL (ref 0–0.4)
EOSINOPHIL NFR BLD: 2 % (ref 0–7)
ERYTHROCYTE [DISTWIDTH] IN BLOOD BY AUTOMATED COUNT: 14.5 % (ref 11.5–14.5)
GLOBULIN SER CALC-MCNC: 3 G/DL (ref 2–4)
GLUCOSE SERPL-MCNC: 89 MG/DL (ref 65–100)
HCT VFR BLD AUTO: 41.9 % (ref 35–47)
HGB BLD-MCNC: 13.5 G/DL (ref 11.5–16)
IMM GRANULOCYTES # BLD AUTO: 0 K/UL (ref 0–0.04)
IMM GRANULOCYTES NFR BLD AUTO: 0 % (ref 0–0.5)
LYMPHOCYTES # BLD: 1.7 K/UL (ref 0.8–3.5)
LYMPHOCYTES NFR BLD: 47 % (ref 12–49)
MCH RBC QN AUTO: 29.2 PG (ref 26–34)
MCHC RBC AUTO-ENTMCNC: 32.2 G/DL (ref 30–36.5)
MCV RBC AUTO: 90.5 FL (ref 80–99)
MONOCYTES # BLD: 0.3 K/UL (ref 0–1)
MONOCYTES NFR BLD: 7 % (ref 5–13)
NEUTS SEG # BLD: 1.6 K/UL (ref 1.8–8)
NEUTS SEG NFR BLD: 43 % (ref 32–75)
NRBC # BLD: 0 K/UL (ref 0–0.01)
NRBC BLD-RTO: 0 PER 100 WBC
PLATELET # BLD AUTO: 159 K/UL (ref 150–400)
PMV BLD AUTO: 11.3 FL (ref 8.9–12.9)
POTASSIUM SERPL-SCNC: 4.4 MMOL/L (ref 3.5–5.1)
PROT SERPL-MCNC: 6.8 G/DL (ref 6.4–8.2)
RBC # BLD AUTO: 4.63 M/UL (ref 3.8–5.2)
SODIUM SERPL-SCNC: 136 MMOL/L (ref 136–145)
WBC # BLD AUTO: 3.6 K/UL (ref 3.6–11)

## 2023-09-25 NOTE — PROGRESS NOTES
[facility]  Cancer Genetics Program at [address] [place] [address]      Name: Karley Willard  Age: 28 y.o.  : 1990  Diagnosis: Desmoid Tumor    CC    Karley Willard is a 28 y.o. female who presents today for high-risk evaluation/concern for a hereditary cancer syndrome. The patient is being referred by Dr. Navjot Bueno, Medical Oncology. The goal of today's visit is to evaluate cancer risks, discuss benefits and limitations of any indicated genetic and genomic testing, and review corresponding management. HPI  In 2023 the patient developed progressive posterior left shoulder pain associated with a palpable mass that does not improve with NSAIDs. MRI in April of this year revealed a 7.3 cm expansile lesion predominantly involving the rhomboid minor, levator scapula, and trapezius muscle. Following MRI she underwent a biopsy with pathology revealing desmoid fibromatosis. Patient discontinued hormonal birth control. Sulindac was recommended. MRI May 30, 2023 showed significant interval growth of the desmoid compared to 2023. 2023 the patient was started on Sorafenib 400 mg daily. Mucositis has been ongoing. Surgery evaluation at [address] [place] [address] 2023 noted that desmoid stabilized but remains large. Suggestion was made to try ablation as surgery would likely not treat all of the tumor with risk of recurrence as well as concerns about overall morbidity of procedure. She had a consult with [facility] to discuss CT guided cryoablation and underwent this procedure on 2023. She reports she is currently taking Pazopanib and hopes to be able to discontinue this in the future. She is interested in maintaining fertility and explains that she and her  are interested in having more children. The patient reports that her mother underwent genetic evaluation through [facility] related to gigantism in her toes.  The patient presents with a copy of her mother's

## 2023-09-27 ENCOUNTER — OFFICE VISIT (OUTPATIENT)
Age: 33
End: 2023-09-27

## 2023-09-27 DIAGNOSIS — D48.119 DESMOID TUMOR: ICD-10-CM

## 2023-09-27 DIAGNOSIS — Z80.8 FAMILY HISTORY OF BRAIN CANCER: ICD-10-CM

## 2023-09-27 DIAGNOSIS — Z80.52 FAMILY HISTORY OF MALIGNANT NEOPLASM OF BLADDER: ICD-10-CM

## 2023-09-27 DIAGNOSIS — Z80.1 FAMILY HISTORY OF MALIGNANT NEOPLASM OF TRACHEA, BRONCHUS, AND LUNG: ICD-10-CM

## 2023-09-27 DIAGNOSIS — Z80.3 FAMILY HISTORY OF MALIGNANT NEOPLASM OF BREAST: ICD-10-CM

## 2023-09-27 DIAGNOSIS — Z84.81 FAMILY HISTORY OF GENE MUTATION: ICD-10-CM

## 2023-09-27 DIAGNOSIS — Z13.71 ENCNTR FOR NONPROCREAT SCREEN FOR GENETIC DIS CARRIER STATUS: Primary | ICD-10-CM

## 2023-09-28 ENCOUNTER — TELEPHONE (OUTPATIENT)
Age: 33
End: 2023-09-28

## 2023-09-28 NOTE — TELEPHONE ENCOUNTER
Telephone call to clarify / inform patient about appointment in [facility] tomorrow for blood draw for genetics. Patient expressed understanding and agreement with this plan. All questions answered.

## 2023-09-29 ENCOUNTER — HOSPITAL ENCOUNTER (OUTPATIENT)
Facility: HOSPITAL | Age: 33
Setting detail: INFUSION SERIES
Discharge: HOME | End: 2023-09-29

## 2023-10-04 NOTE — PROGRESS NOTES
Baby moving  No concerns  Recheck plts at 34 weeks Tazorac Pregnancy And Lactation Text: This medication is not safe during pregnancy. It is unknown if this medication is excreted in breast milk.

## 2023-10-17 ENCOUNTER — TELEPHONE (OUTPATIENT)
Age: 33
End: 2023-10-17

## 2023-10-18 DIAGNOSIS — D48.119 DESMOID TUMOR: Primary | ICD-10-CM

## 2023-10-18 NOTE — PROGRESS NOTES
--correspondence by Dr. Maldonado Sandy that patient having more pain and has requested that patient have an MRI chest to evaluate tumor size. -- order placed. Please contact us if any new questions or concerns. Non-Urgent Matters: Call our clinic at [phone] during open clinic hours. Please note that Sgrouples Messages may not be immediately returned. Urgent Matters: Call hospital  ([facility] or [facility]) at night or on weekends for questions that cannot wait until clinic opens. Emergency: Call 9-1-1.     Arun Deras MD  Hematology/Medical Oncology Provider  [address] [facility]  P: [phone]

## 2023-10-23 ENCOUNTER — HOSPITAL ENCOUNTER (OUTPATIENT)
Facility: HOSPITAL | Age: 33
Discharge: HOME | End: 2023-10-26
Attending: INTERNAL MEDICINE
Payer: COMMERCIAL

## 2023-10-23 DIAGNOSIS — D48.119 DESMOID TUMOR: ICD-10-CM

## 2023-10-23 PROCEDURE — 71552 MRI CHEST W/O & W/DYE: CPT

## 2023-10-23 PROCEDURE — 6360000004 HC RX CONTRAST MEDICATION: Performed by: INTERNAL MEDICINE

## 2023-10-23 PROCEDURE — A9579 GAD-BASE MR CONTRAST NOS,1ML: HCPCS | Performed by: INTERNAL MEDICINE

## 2023-10-23 RX ADMIN — GADOTERIDOL 12 ML: 279.3 INJECTION, SOLUTION INTRAVENOUS at 20:29

## 2023-10-26 ENCOUNTER — PATIENT MESSAGE (OUTPATIENT)
Age: 33
End: 2023-10-26

## 2023-11-15 DIAGNOSIS — Z79.899 HIGH RISK MEDICATION USE: ICD-10-CM

## 2023-11-15 DIAGNOSIS — C90.00 MULTIPLE MYELOMA NOT HAVING ACHIEVED REMISSION (HCC): Primary | ICD-10-CM

## 2023-11-15 DIAGNOSIS — D48.119 DESMOID TUMOR: ICD-10-CM

## 2023-11-17 DIAGNOSIS — D48.119 DESMOID TUMOR: ICD-10-CM

## 2023-11-17 DIAGNOSIS — Z79.899 HIGH RISK MEDICATION USE: ICD-10-CM

## 2023-11-17 LAB
ALBUMIN SERPL-MCNC: 4.2 G/DL (ref 3.5–5)
ALBUMIN/GLOB SERPL: 1.4 (ref 1.1–2.2)
ALP SERPL-CCNC: 41 U/L (ref 45–117)
ALT SERPL-CCNC: 27 U/L (ref 12–78)
ANION GAP SERPL CALC-SCNC: 6 MMOL/L (ref 5–15)
AST SERPL-CCNC: 20 U/L (ref 15–37)
BASOPHILS # BLD: 0 K/UL (ref 0–0.1)
BASOPHILS NFR BLD: 1 % (ref 0–1)
BILIRUB SERPL-MCNC: 0.2 MG/DL (ref 0.2–1)
BUN SERPL-MCNC: 17 MG/DL (ref 6–20)
BUN/CREAT SERPL: 26 (ref 12–20)
CALCIUM SERPL-MCNC: 9.7 MG/DL (ref 8.5–10.1)
CHLORIDE SERPL-SCNC: 105 MMOL/L (ref 97–108)
CO2 SERPL-SCNC: 27 MMOL/L (ref 21–32)
CREAT SERPL-MCNC: 0.65 MG/DL (ref 0.55–1.02)
DIFFERENTIAL METHOD BLD: ABNORMAL
EOSINOPHIL # BLD: 0 K/UL (ref 0–0.4)
EOSINOPHIL NFR BLD: 0 % (ref 0–7)
ERYTHROCYTE [DISTWIDTH] IN BLOOD BY AUTOMATED COUNT: 15.8 % (ref 11.5–14.5)
GLOBULIN SER CALC-MCNC: 2.9 G/DL (ref 2–4)
GLUCOSE SERPL-MCNC: 101 MG/DL (ref 65–100)
HCT VFR BLD AUTO: 40.8 % (ref 35–47)
HGB BLD-MCNC: 13.7 G/DL (ref 11.5–16)
IMM GRANULOCYTES # BLD AUTO: 0 K/UL (ref 0–0.04)
IMM GRANULOCYTES NFR BLD AUTO: 0 % (ref 0–0.5)
LYMPHOCYTES # BLD: 1.5 K/UL (ref 0.8–3.5)
LYMPHOCYTES NFR BLD: 26 % (ref 12–49)
MCH RBC QN AUTO: 31.9 PG (ref 26–34)
MCHC RBC AUTO-ENTMCNC: 33.6 G/DL (ref 30–36.5)
MCV RBC AUTO: 95.1 FL (ref 80–99)
MONOCYTES # BLD: 0.5 K/UL (ref 0–1)
MONOCYTES NFR BLD: 8 % (ref 5–13)
NEUTS SEG # BLD: 3.8 K/UL (ref 1.8–8)
NEUTS SEG NFR BLD: 65 % (ref 32–75)
NRBC # BLD: 0 K/UL (ref 0–0.01)
NRBC BLD-RTO: 0 PER 100 WBC
PLATELET # BLD AUTO: 143 K/UL (ref 150–400)
PMV BLD AUTO: 10.8 FL (ref 8.9–12.9)
POTASSIUM SERPL-SCNC: 4.8 MMOL/L (ref 3.5–5.1)
PROT SERPL-MCNC: 7.1 G/DL (ref 6.4–8.2)
RBC # BLD AUTO: 4.29 M/UL (ref 3.8–5.2)
SODIUM SERPL-SCNC: 138 MMOL/L (ref 136–145)
TSH SERPL DL<=0.05 MIU/L-ACNC: 2.56 UIU/ML (ref 0.36–3.74)
WBC # BLD AUTO: 5.9 K/UL (ref 3.6–11)

## 2024-07-08 NOTE — PROGRESS NOTES
Ashley Guevara is a 34 y.o. female returns for an annual exam     Chief Complaint   Patient presents with    Annual Exam       Patient's last menstrual period was 06/30/2024.  Her periods are light, moderate in flow and usually regular with a 26-32 day interval with 3-7 day duration.  She does not have dysmenorrhea.  Problems: trying to conceive  Birth Control: none.  Last Pap: 10/20/2022 normal/HPV neg  She does not have a history of ALIX 2, 3 or cervical cancer.   With regard to the Gardisil vaccine, she has not received it yet        Examination chaperoned by Goldie Burns MA.

## 2024-07-09 ENCOUNTER — OFFICE VISIT (OUTPATIENT)
Age: 34
End: 2024-07-09
Payer: COMMERCIAL

## 2024-07-09 VITALS — DIASTOLIC BLOOD PRESSURE: 85 MMHG | WEIGHT: 137.8 LBS | BODY MASS INDEX: 26.04 KG/M2 | SYSTOLIC BLOOD PRESSURE: 127 MMHG

## 2024-07-09 DIAGNOSIS — N92.6 IRREGULAR MENSES: ICD-10-CM

## 2024-07-09 DIAGNOSIS — Z01.419 ENCOUNTER FOR GYNECOLOGICAL EXAMINATION (GENERAL) (ROUTINE) WITHOUT ABNORMAL FINDINGS: Primary | ICD-10-CM

## 2024-07-09 PROCEDURE — 99395 PREV VISIT EST AGE 18-39: CPT | Performed by: OBSTETRICS & GYNECOLOGY

## 2024-07-09 SDOH — ECONOMIC STABILITY: FOOD INSECURITY: WITHIN THE PAST 12 MONTHS, YOU WORRIED THAT YOUR FOOD WOULD RUN OUT BEFORE YOU GOT MONEY TO BUY MORE.: PATIENT DECLINED

## 2024-07-09 SDOH — ECONOMIC STABILITY: INCOME INSECURITY: HOW HARD IS IT FOR YOU TO PAY FOR THE VERY BASICS LIKE FOOD, HOUSING, MEDICAL CARE, AND HEATING?: PATIENT DECLINED

## 2024-07-09 SDOH — ECONOMIC STABILITY: FOOD INSECURITY: WITHIN THE PAST 12 MONTHS, THE FOOD YOU BOUGHT JUST DIDN'T LAST AND YOU DIDN'T HAVE MONEY TO GET MORE.: PATIENT DECLINED

## 2024-07-09 SDOH — ECONOMIC STABILITY: HOUSING INSECURITY
IN THE LAST 12 MONTHS, WAS THERE A TIME WHEN YOU DID NOT HAVE A STEADY PLACE TO SLEEP OR SLEPT IN A SHELTER (INCLUDING NOW)?: PATIENT DECLINED

## 2024-07-09 ASSESSMENT — PATIENT HEALTH QUESTIONNAIRE - PHQ9
SUM OF ALL RESPONSES TO PHQ9 QUESTIONS 1 & 2: 0
2. FEELING DOWN, DEPRESSED OR HOPELESS: NOT AT ALL
SUM OF ALL RESPONSES TO PHQ QUESTIONS 1-9: 0
SUM OF ALL RESPONSES TO PHQ QUESTIONS 1-9: 0
1. LITTLE INTEREST OR PLEASURE IN DOING THINGS: NOT AT ALL
SUM OF ALL RESPONSES TO PHQ QUESTIONS 1-9: 0
SUM OF ALL RESPONSES TO PHQ QUESTIONS 1-9: 0

## 2024-07-09 NOTE — PROGRESS NOTES
Ashley Guevara is a No obstetric history on file.,  34 y.o. female White (non-) whose Patient's last menstrual period was 2024.  was on 2024 who presents for her annual checkup. She is having no problems. Had cryoablation for desmoid. Took a chemo last year and periods stopped for a while. Returned in December      Menstrual status:    Her periods are moderate in flow, regular    She denies dysmenorrhea.        Contraception:    The current method of family planning is none    Sexual history:    She  reports being sexually active and has had partner(s) who are male. She reports using the following method of birth control/protection: None.      Pap and Mammogram History:  Last Pap: 10/20/2022 normal/HPV neg  She does not have a history of ALIX 2, 3 or cervical cancer.   With regard to the Gardisil vaccine, she has not received it yet      The patient does have a family history of breast cancer.  Family History   Problem Relation Age of Onset    Hypertension Mother     Lupus Sister     Breast Cancer Maternal Grandmother         Breast, kidney and bone CA    Ovarian Cancer Maternal Grandmother     Colon Cancer Maternal Grandfather 80    Other Cancer Neg Hx         desmoid       Past Medical History:   Diagnosis Date    Abnormal Papanicolaou smear of cervix     Psychiatric problem     ADD     Past Surgical History:   Procedure Laterality Date     SECTION  2018    COLPOSCOPY  2016    neg path    OTHER SURGICAL HISTORY      [place] teeth    WISDOM TOOTH EXTRACTION         Current Outpatient Medications   Medication Sig Dispense Refill    amphetamine-dextroamphetamine (ADDERALL) 20 MG tablet Take 1 tablet by mouth 2 times daily.       No current facility-administered medications for this visit.     Allergies: Sulfa antibiotics   Tobacco History:  reports that she has never smoked. She has never used smokeless tobacco.  Alcohol Abuse:  reports that she does not currently use alcohol.  Drug

## 2024-07-10 LAB — TSH SERPL DL<=0.005 MIU/L-ACNC: 1.92 UIU/ML (ref 0.45–4.5)

## 2024-07-14 LAB — MIS SERPL-MCNC: 4.8 NG/ML

## 2024-07-21 NOTE — PROGRESS NOTES
Arrived in error.   In room to empty patient's zayas and perform pericare. Visitor in wheelchair blocking bathroom. Per patient, will call me to come back in to after she has visitors leave. Will reattempt shortly. approx 700ml light yellow urine visualized in zayas but will put accurate measurement once emptied.

## 2024-07-25 ENCOUNTER — LAB (OUTPATIENT)
Age: 34
End: 2024-07-25

## 2024-07-25 DIAGNOSIS — Z32.01 POSITIVE PREGNANCY TEST: Primary | ICD-10-CM

## 2024-07-26 LAB — HCG SERPL-ACNC: 20 MIU/ML (ref 0–6)

## 2024-07-29 ENCOUNTER — LAB (OUTPATIENT)
Age: 34
End: 2024-07-29

## 2024-07-29 DIAGNOSIS — R79.89 ELEVATED SERUM HCG: Primary | ICD-10-CM

## 2024-07-29 DIAGNOSIS — Z32.01 POSITIVE PREGNANCY TEST: ICD-10-CM

## 2024-07-29 DIAGNOSIS — O36.80X0 PREGNANCY, LOCATION UNKNOWN: ICD-10-CM

## 2024-07-30 LAB — HCG SERPL-ACNC: 257 MIU/ML (ref 0–6)

## 2024-08-14 DIAGNOSIS — N92.6 MISSED MENSES: Primary | ICD-10-CM

## 2024-08-20 ENCOUNTER — TELEPHONE (OUTPATIENT)
Age: 34
End: 2024-08-20

## 2024-08-20 ENCOUNTER — ROUTINE PRENATAL (OUTPATIENT)
Age: 34
End: 2024-08-20

## 2024-08-20 VITALS
SYSTOLIC BLOOD PRESSURE: 109 MMHG | WEIGHT: 140.4 LBS | DIASTOLIC BLOOD PRESSURE: 73 MMHG | HEART RATE: 97 BPM | BODY MASS INDEX: 26.51 KG/M2 | HEIGHT: 61 IN

## 2024-08-20 DIAGNOSIS — Z3A.01 7 WEEKS GESTATION OF PREGNANCY: ICD-10-CM

## 2024-08-20 DIAGNOSIS — O09.529 SUPERVISION OF HIGH-RISK PREGNANCY OF ELDERLY MULTIGRAVIDA: Primary | ICD-10-CM

## 2024-08-20 DIAGNOSIS — Z11.3 SCREENING FOR VENEREAL DISEASE: ICD-10-CM

## 2024-08-20 DIAGNOSIS — O09.521 MULTIGRAVIDA OF ADVANCED MATERNAL AGE IN FIRST TRIMESTER: ICD-10-CM

## 2024-08-20 DIAGNOSIS — Z36.9 UNSPECIFIED ANTENATAL SCREENING: ICD-10-CM

## 2024-08-20 PROCEDURE — 0501F PRENATAL FLOW SHEET: CPT | Performed by: OBSTETRICS & GYNECOLOGY

## 2024-08-20 RX ORDER — DOXYLAMINE SUCCINATE AND PYRIDOXINE HYDROCHLORIDE 20; 20 MG/1; MG/1
1 TABLET, EXTENDED RELEASE ORAL EVERY 12 HOURS
Qty: 60 TABLET | Refills: 5 | Status: SHIPPED | OUTPATIENT
Start: 2024-08-20

## 2024-08-20 RX ORDER — PNV NO.95/FERROUS FUM/FOLIC AC 28MG-0.8MG
1 TABLET ORAL DAILY
Qty: 90 TABLET | Refills: 5 | Status: SHIPPED | OUTPATIENT
Start: 2024-08-20

## 2024-08-20 NOTE — TELEPHONE ENCOUNTER
Spoke with patient, patient requesting to callback after her blood work. Appointment reserved for 12 weeks

## 2024-08-20 NOTE — PROGRESS NOTES
Ashley Guevara is a 34 y.o. female presents for a new pregnancy visit.    Chief Complaint   Patient presents with    Initial Prenatal Visit       Problems: Amenorrhea   LMP: 2024    Last Pap:Normal HPV negative obtained 10/20/2022    LMP history:  The date of her LMP is certain.  Her last menstrual period was normal and lasted for 4 to 5 days.  A urine pregnancy test was positive 3 weeks ago. She was not on the pill at conception.     Based on her LMP, her EDC is 2025 and her EGA is 7 weeks,1 days. Her menstrual cycles are regular and occur approximately every 28 days and range from 3 to 5 days. The last menses lasted  the usual number of days.      Ultrasound data:  She had an ultrasound done by the ultrasound tech today which revealed a viable bloom pregnancy with a gestational age of 7 weeks and 1 days giving an EDC of 2025.    Pregnancy symptoms:    Since her LMP she has experienced urinary frequency, breast tenderness, and nausea.   She has been vomiting over the last few weeks.  Associated signs and symptoms which she denies: dysuria, discharge, vaginal bleeding.    She states she has gained weight:  Approximately 5 pounds over the last few weeks.    Relevant past pregnancy history:   She has the following pregnancy history: None    She has no history of  delivery.None    Relevant past medical history:(relevant to this pregnancy): noncontributory.      Her occupation is: [employer] .     1. Have you been to the ER, urgent care clinic, or hospitalized since your last visit? No    2. Have you seen or consulted any other health care providers outside of the [facility] since your last visit? No    Examination chaperoned by Gogo Burton LPN.  
 Ht 1.549 m (5' 1\")   Wt 63.7 kg (140 lb 6.4 oz)   LMP 06/30/2024 (Exact Date)   BMI 26.53 kg/m²     Physical Exam:     Constitutional  Appearance: well-nourished, well developed, alert, in no acute distress    HENT  Head  Face: appears normal  Eyes: appear normal  Ears: normal  Mouth: normal  Lips: no lesions    Neck  Inspection/Palpation: normal appearance, no masses or tenderness  Lymph Nodes: no lymphadenopathy present  Thyroid: gland size normal, nontender, no nodules or masses present on palpation    Chest  Respiratory Effort: breathing unlabored  Auscultation: normal breath sounds    Cardiovascular  Heart:  Auscultation: regular rate and rhythm without murmur    Breasts  Inspection of Breasts: breasts symmetrical, no skin changes, no discharge present, nipple appearance normal, no skin retraction present  Palpation of Breasts and Axillae: no masses present on palpation, no breast tenderness  Axillary Lymph Nodes: no lymphadenopathy present    Gastrointestinal  Abdominal Examination: abdomen non-tender to palpation, normal bowel sounds, no masses present  Liver and spleen: no hepatomegaly present, spleen not palpable  Hernias: no hernias identified    Genitourinary  External Genitalia: normal appearance for age, no discharge present, no tenderness present, no inflammatory lesions present, no masses present, no atrophy present  Vagina: normal vaginal vault without central or paravaginal defects, no discharge present, no inflammatory lesions present, no masses present  Bladder: non-tender to palpation  Urethra: appears normal  Cervix: normal   Uterus: enlarged, normal shape, soft  Adnexa: no adnexal tenderness present, no adnexal masses present  Perineum: perineum within normal limits, no evidence of trauma, no rashes or skin lesions present  Anus: anus within normal limits, no hemorrhoids present  Inguinal Lymph Nodes: no lymphadenopathy present    Skin  General Inspection: no rash, no lesions 
Abdominal pain

## 2024-08-21 LAB
ABO + RH BLD: NORMAL
ABO, EXTERNAL RESULT: NORMAL
BLOOD BANK CMNT PATIENT-IMP: NORMAL
BLOOD GROUP ANTIBODIES SERPL: NORMAL
RH FACTOR, EXTERNAL RESULT: NEGATIVE
SPECIMEN EXP DATE BLD: NORMAL

## 2024-08-22 LAB
BACTERIA SPEC CULT: ABNORMAL
CC UR VC: ABNORMAL
SERVICE CMNT-IMP: ABNORMAL
T PALLIDUM AB SER QL IA: NON REACTIVE
T. PALLIDUM (SYPHILIS) ANTIBODY, EXTERNAL RESULT: NORMAL
VZV IGG SER IA-ACNC: 3109 INDEX

## 2024-08-22 RX ORDER — AMOXICILLIN 500 MG/1
500 CAPSULE ORAL 3 TIMES DAILY
Qty: 21 CAPSULE | Refills: 0 | Status: SHIPPED | OUTPATIENT
Start: 2024-08-22 | End: 2024-08-29

## 2024-08-23 LAB
ALBUMIN SERPL-MCNC: 4.2 G/DL (ref 3.5–5)
ALBUMIN/GLOB SERPL: 1.4 (ref 1.1–2.2)
ALP SERPL-CCNC: 43 U/L (ref 45–117)
ALT SERPL-CCNC: 19 U/L (ref 12–78)
ANION GAP SERPL CALC-SCNC: 4 MMOL/L (ref 5–15)
AST SERPL-CCNC: 18 U/L (ref 15–37)
BILIRUB SERPL-MCNC: 0.2 MG/DL (ref 0.2–1)
BUN SERPL-MCNC: 12 MG/DL (ref 6–20)
BUN/CREAT SERPL: 22 (ref 12–20)
C TRACH RRNA SPEC QL NAA+PROBE: NEGATIVE
C. TRACHOMATIS, EXTERNAL RESULT: NEGATIVE
CALCIUM SERPL-MCNC: 9.8 MG/DL (ref 8.5–10.1)
CHLORIDE SERPL-SCNC: 102 MMOL/L (ref 97–108)
CO2 SERPL-SCNC: 29 MMOL/L (ref 21–32)
CREAT SERPL-MCNC: 0.55 MG/DL (ref 0.55–1.02)
ERYTHROCYTE [DISTWIDTH] IN BLOOD BY AUTOMATED COUNT: 11.9 % (ref 11.5–14.5)
GLOBULIN SER CALC-MCNC: 2.9 G/DL (ref 2–4)
GLUCOSE SERPL-MCNC: 86 MG/DL (ref 65–100)
HBV SURFACE AG SER QL: <0.1 INDEX
HBV SURFACE AG SER QL: NEGATIVE
HCT VFR BLD AUTO: 40.3 % (ref 35–47)
HCV AB SER IA-ACNC: 0.03 INDEX
HCV AB SERPL QL IA: NONREACTIVE
HEP B, EXTERNAL RESULT: NEGATIVE
HEPATITIS C ANTIBODY, EXTERNAL RESULT: NORMAL
HGB BLD-MCNC: 13.3 G/DL (ref 11.5–16)
HIV 1+2 AB+HIV1 P24 AG SERPL QL IA: NONREACTIVE
HIV 1/2 RESULT COMMENT: NORMAL
HIV, EXTERNAL RESULT: NORMAL
MCH RBC QN AUTO: 31.2 PG (ref 26–34)
MCHC RBC AUTO-ENTMCNC: 33 G/DL (ref 30–36.5)
MCV RBC AUTO: 94.6 FL (ref 80–99)
N GONORRHOEA RRNA SPEC QL NAA+PROBE: NEGATIVE
N. GONORRHOEAE, EXTERNAL RESULT: NEGATIVE
NRBC # BLD: 0 K/UL (ref 0–0.01)
NRBC BLD-RTO: 0 PER 100 WBC
PLATELET # BLD AUTO: 166 K/UL (ref 150–400)
PMV BLD AUTO: 11.3 FL (ref 8.9–12.9)
POTASSIUM SERPL-SCNC: 3.6 MMOL/L (ref 3.5–5.1)
PROT SERPL-MCNC: 7.1 G/DL (ref 6.4–8.2)
RBC # BLD AUTO: 4.26 M/UL (ref 3.8–5.2)
RUBV IGG SERPL IA-ACNC: NORMAL IU/ML
SODIUM SERPL-SCNC: 135 MMOL/L (ref 136–145)
T VAGINALIS RRNA SPEC QL NAA+PROBE: NEGATIVE
WBC # BLD AUTO: 7.6 K/UL (ref 3.6–11)

## 2024-09-13 DIAGNOSIS — Z36.9 ENCOUNTER FOR ANTENATAL SCREENING OF MOTHER: Primary | ICD-10-CM

## 2024-09-18 ENCOUNTER — ROUTINE PRENATAL (OUTPATIENT)
Age: 34
End: 2024-09-18
Payer: COMMERCIAL

## 2024-09-18 VITALS
SYSTOLIC BLOOD PRESSURE: 116 MMHG | DIASTOLIC BLOOD PRESSURE: 77 MMHG | HEART RATE: 132 BPM | WEIGHT: 143 LBS | BODY MASS INDEX: 27.02 KG/M2

## 2024-09-18 DIAGNOSIS — Z3A.11 11 WEEKS GESTATION OF PREGNANCY: ICD-10-CM

## 2024-09-18 DIAGNOSIS — D48.119 DESMOID TUMOR: ICD-10-CM

## 2024-09-18 DIAGNOSIS — O16.9 HYPERTENSION AFFECTING PREGNANCY, ANTEPARTUM: ICD-10-CM

## 2024-09-18 DIAGNOSIS — Z23 NEED FOR VACCINATION: ICD-10-CM

## 2024-09-18 DIAGNOSIS — O09.529 SUPERVISION OF HIGH-RISK PREGNANCY OF ELDERLY MULTIGRAVIDA: Primary | ICD-10-CM

## 2024-09-18 PROCEDURE — 0502F SUBSEQUENT PRENATAL CARE: CPT | Performed by: OBSTETRICS & GYNECOLOGY

## 2024-09-18 PROCEDURE — 90661 CCIIV3 VAC ABX FR 0.5 ML IM: CPT | Performed by: OBSTETRICS & GYNECOLOGY

## 2024-09-18 PROCEDURE — 90471 IMMUNIZATION ADMIN: CPT | Performed by: OBSTETRICS & GYNECOLOGY

## 2024-09-19 LAB
CREAT UR-MCNC: 160 MG/DL
PROT UR-MCNC: 18 MG/DL (ref 0–11.9)
PROT/CREAT UR-RTO: 0.1

## 2024-09-23 NOTE — PROGRESS NOTES
EDC 2025 D=US  NIPTS normal female  Horizon neg prior preg  AMA 36 at delivery - MFM 12 weeks  Desmoid fibromatosis - Batalo  Hx of CS breech, then  8# - desires   CHTN post chemo - no meds  Baseline PCR 0.1  Baby ASA 12 weeks  Hx of gestational thrombocytopenia  Varicella immune   GBS pos urine   Flu vaccine 24

## 2024-09-25 ENCOUNTER — ROUTINE PRENATAL (OUTPATIENT)
Age: 34
End: 2024-09-25
Payer: COMMERCIAL

## 2024-09-25 VITALS — DIASTOLIC BLOOD PRESSURE: 74 MMHG | SYSTOLIC BLOOD PRESSURE: 107 MMHG | HEART RATE: 96 BPM

## 2024-09-25 DIAGNOSIS — O09.521 SUPERVISION OF ELDERLY MULTIGRAVIDA IN FIRST TRIMESTER: Primary | ICD-10-CM

## 2024-09-25 PROCEDURE — 76813 OB US NUCHAL MEAS 1 GEST: CPT | Performed by: OBSTETRICS & GYNECOLOGY

## 2024-09-25 PROCEDURE — 76811 OB US DETAILED SNGL FETUS: CPT | Performed by: OBSTETRICS & GYNECOLOGY

## 2024-09-25 PROCEDURE — 99203 OFFICE O/P NEW LOW 30 MIN: CPT | Performed by: OBSTETRICS & GYNECOLOGY

## 2024-09-25 RX ORDER — ASPIRIN 81 MG/1
81 TABLET ORAL DAILY
COMMUNITY

## 2024-10-14 ENCOUNTER — ROUTINE PRENATAL (OUTPATIENT)
Age: 34
End: 2024-10-14

## 2024-10-14 VITALS — SYSTOLIC BLOOD PRESSURE: 115 MMHG | DIASTOLIC BLOOD PRESSURE: 77 MMHG | WEIGHT: 146 LBS | BODY MASS INDEX: 27.59 KG/M2

## 2024-10-14 DIAGNOSIS — Z3A.15 15 WEEKS GESTATION OF PREGNANCY: ICD-10-CM

## 2024-10-14 DIAGNOSIS — O16.9 HYPERTENSION AFFECTING PREGNANCY, ANTEPARTUM: ICD-10-CM

## 2024-10-14 DIAGNOSIS — Z36.9 ENCOUNTER FOR ANTENATAL SCREENING OF MOTHER: ICD-10-CM

## 2024-10-14 DIAGNOSIS — D48.119 DESMOID TUMOR: ICD-10-CM

## 2024-10-14 DIAGNOSIS — O09.529 SUPERVISION OF HIGH-RISK PREGNANCY OF ELDERLY MULTIGRAVIDA: Primary | ICD-10-CM

## 2024-10-14 PROCEDURE — 0502F SUBSEQUENT PRENATAL CARE: CPT | Performed by: OBSTETRICS & GYNECOLOGY

## 2024-10-18 LAB
AFP INTERP SERPL-IMP: NORMAL
AFP MOM SERPL: 0.76
AFP SERPL-MCNC: 22.8 NG/ML
AGE AT DELIVERY: 36 YR
AGE OF EGG DONOR: NORMAL
AGE OF EGG DONOR: NORMAL
COMMENT: NORMAL
DONOR EGG?: NO
DONOR EGG?: NORMAL
FAMILY HISTORY NTD: NORMAL
FHX NTD (Y OR N): NORMAL
GA METHOD: NORMAL
GA: 15.1 WEEKS
IDDM PATIENT QL: NO
INSULIN DEP. DIABETIC: NORMAL
Lab: 146
Lab: NORMAL
Lab: NORMAL
MAT SCN FOR FETAL ABNORMALITIES SERPL: NORMAL
MULTIPLE PREGNANCY: NO
NEURAL TUBE DEFECT RISK FETUS: NORMAL
NUMBER OF FETUSES: NO
OTHER INDICATIONS: NO
OTHER INDICATIONS: NORMAL
PREVIOUSLY ELEVATED AFP (Y OR N): 15
PREVIOUSLY ELEVATED AFP (Y OR N): NO
PRIOR 1ST TRIM TESTING ?: NO
PRIOR 2ND TRIM TESTING ?: NO
PRIOR DS/NTD SCREEN CURRENT PREGNANCY?: NO
PRIOR DS/NTD SCREEN CURRENT PREGNANCY?: NORMAL
PRIOR FIRST TRIMESTER TESTING (Y OR N ): NORMAL
PRIOR PREGNANCY WITH DOWN SYNDROME (Y OR N): 1
PRIOR PREGNANCY WITH DOWN SYNDROME (Y OR N): NO
TYPE OF EGG DONOR: NORMAL
TYPE OF EGG DONOR: NORMAL

## 2024-11-18 ENCOUNTER — ROUTINE PRENATAL (OUTPATIENT)
Age: 34
End: 2024-11-18

## 2024-11-18 VITALS
HEART RATE: 100 BPM | BODY MASS INDEX: 29.66 KG/M2 | DIASTOLIC BLOOD PRESSURE: 77 MMHG | WEIGHT: 157 LBS | SYSTOLIC BLOOD PRESSURE: 110 MMHG

## 2024-11-18 DIAGNOSIS — D48.119 DESMOID TUMOR: ICD-10-CM

## 2024-11-18 DIAGNOSIS — O09.529 SUPERVISION OF HIGH-RISK PREGNANCY OF ELDERLY MULTIGRAVIDA: Primary | ICD-10-CM

## 2024-11-18 DIAGNOSIS — O16.9 HYPERTENSION AFFECTING PREGNANCY, ANTEPARTUM: ICD-10-CM

## 2024-11-18 DIAGNOSIS — Z3A.20 20 WEEKS GESTATION OF PREGNANCY: ICD-10-CM

## 2024-11-18 PROCEDURE — 0502F SUBSEQUENT PRENATAL CARE: CPT | Performed by: OBSTETRICS & GYNECOLOGY

## 2024-11-20 ENCOUNTER — ROUTINE PRENATAL (OUTPATIENT)
Age: 34
End: 2024-11-20

## 2024-11-20 VITALS — DIASTOLIC BLOOD PRESSURE: 66 MMHG | SYSTOLIC BLOOD PRESSURE: 109 MMHG | HEART RATE: 109 BPM

## 2024-11-20 DIAGNOSIS — Z3A.20 20 WEEKS GESTATION OF PREGNANCY: Primary | ICD-10-CM

## 2024-11-20 NOTE — PROCEDURES
PATIENT: CATE JOE   -  : 1990   -  DOS:2024   -  INTERPRETING PROVIDER:Trina Stratton,   Indication  ========    AMA, history of Desmoid tumor    Method  ======    Transabdominal ultrasound examination. View: Suboptimal view: limited by fetal position    Dating  ======    LMP on: 2024  Cycle: regular cycle  GA by LMP 20 w + 3 d  OTILIA by LMP: 2025  Previous Ultrasound on: 2024  Type of prior assessment: GA  GA at prior assessment date 7 w + 1 d  GA by previous U/S 20 w + 2 d  OTILIA by previous Ultrasound: 2025  Ultrasound examination on: 2024  GA by U/S based upon: AC, BPD, Femur, HC  GA by U/S 20 w + 1 d  OTILIA by U/S: 2025  Assigned: based on the LMP, selected on 2024  Assigned GA 20 w + 3 d  Assigned OTILIA: 2025    Fetal Growth Overview  =================    Exam date        GA              BPD (mm)        HC (mm)              AC (mm)               FL (mm)             HL (mm)          EFW (g)  2024        20w 3d        45     18%         176.1    27%        156.7     57%        31.8    24%        34     87%        347    39%    Fetal Biometry  ============    Standard  BPD 45.0 mm 19w 4d 18% Hadlock  OFD 64.8 mm 22w 0d 92% Freddy  .1 mm 20w 1d 27% Hadlock  Cerebellum tr 21.3 mm 20w 1d 62% Hill  Nuchal fold 5.6 mm  .7 mm 20w 6d 57% Hadlock  Femur 31.8 mm 19w 6d 24% Hadlock  Humerus 34.0 mm 21w 4d 87% Freddy   g 20w 2d 39% Hadlock  EFW (lb) 0 lb  EFW (oz) 12 oz  EFW by: Hadlock (BPD-HC-AC-FL)  Extended  CM 4.2 mm  22% Nicolaides  Nasal bone 7.7 mm  Head / Face / Neck  Nasal bone: present  Other Structures   bpm    General Evaluation  ==============    Cardiac activity present.  bpm. Fetal movements: visualized. Presentation: BREECH  Placenta: Placental site: posterior, appropriate distance from the internal os  Umbilical cord: Cord vessels: 3 vessel cord, 3 vessel cord. Insertion site: central  Amniotic fluid: Amount of

## 2024-12-11 NOTE — TELEPHONE ENCOUNTER
34year old patient last seen in the office on 8/18/2021 and has next appointment on 10/6/2022    Patient calling to ask for a refill of her ocp to get her to her scheduled appointment      Prescription sent as per Md order to get patient to her scheduled appointment    Patient advised of need to keep appointment in order to get further refills. Patient verbalized understanding. Attending Attestation (For Attendings USE Only)...

## 2024-12-16 ENCOUNTER — ROUTINE PRENATAL (OUTPATIENT)
Age: 34
End: 2024-12-16

## 2024-12-16 VITALS — SYSTOLIC BLOOD PRESSURE: 123 MMHG | DIASTOLIC BLOOD PRESSURE: 67 MMHG | BODY MASS INDEX: 30.8 KG/M2 | WEIGHT: 163 LBS

## 2024-12-16 DIAGNOSIS — M53.3 ACUTE COCCYGEAL PAIN: ICD-10-CM

## 2024-12-16 DIAGNOSIS — Z3A.24 24 WEEKS GESTATION OF PREGNANCY: ICD-10-CM

## 2024-12-16 DIAGNOSIS — D48.119 DESMOID TUMOR: ICD-10-CM

## 2024-12-16 DIAGNOSIS — O09.529 SUPERVISION OF HIGH-RISK PREGNANCY OF ELDERLY MULTIGRAVIDA: Primary | ICD-10-CM

## 2024-12-16 DIAGNOSIS — O16.9 HYPERTENSION AFFECTING PREGNANCY, ANTEPARTUM: ICD-10-CM

## 2024-12-16 PROCEDURE — 0502F SUBSEQUENT PRENATAL CARE: CPT | Performed by: OBSTETRICS & GYNECOLOGY

## 2024-12-17 NOTE — PROGRESS NOTES
EDC 2025 D=US  NIPTS normal female  Horizon neg prior preg  AMA 36 at delivery - MFM 12 weeks  Desmoid fibromatosis - Batalo  Hx of CS breech, then  8# - desires   CHTN post chemo - no meds  Baseline PCR 0.1  Baby ASA 12 weeks  Hx of gestational thrombocytopenia  Varicella immune   GBS pos urine   Flu vaccine 24  Coccyx pain   After obtaining consent, and per orders of , injection of TDap given in R.Deltoid by Gogo Burton LPN. Patient instructed to remain in clinic for 20 minutes afterwards, and to report any adverse reaction to me immediately. Lot: [id number] Exp: 27 NDC: 45545-811-66 , VIS given.

## 2024-12-19 ENCOUNTER — ROUTINE PRENATAL (OUTPATIENT)
Age: 34
End: 2024-12-19
Payer: COMMERCIAL

## 2024-12-19 VITALS — SYSTOLIC BLOOD PRESSURE: 124 MMHG | HEART RATE: 114 BPM | DIASTOLIC BLOOD PRESSURE: 83 MMHG

## 2024-12-19 DIAGNOSIS — O09.523 ELDERLY MULTIGRAVIDA IN THIRD TRIMESTER: Primary | ICD-10-CM

## 2024-12-19 DIAGNOSIS — O34.219 H/O CESAREAN SECTION COMPLICATING PREGNANCY: ICD-10-CM

## 2024-12-19 DIAGNOSIS — Z3A.24 24 WEEKS GESTATION OF PREGNANCY: ICD-10-CM

## 2024-12-19 PROCEDURE — 99024 POSTOP FOLLOW-UP VISIT: CPT | Performed by: OBSTETRICS & GYNECOLOGY

## 2024-12-19 PROCEDURE — 76816 OB US FOLLOW-UP PER FETUS: CPT | Performed by: OBSTETRICS & GYNECOLOGY

## 2024-12-19 NOTE — PROCEDURES
PATIENT: CATE JOE   -  : 1990   -  DOS:2024   -  INTERPRETING PROVIDER:Gulshan Harding,   Indication  ========    AMA, history of Desmoid tumor.    Method  ======    Transabdominal ultrasound examination. View: Good view    Pregnancy  =========    Giron pregnancy. Number of fetuses: 1    Dating  ======    LMP on: 2024  Cycle: regular cycle  GA by LMP 24 w + 4 d  OTILIA by LMP: 2025  Previous Ultrasound on: 2024  Type of prior assessment: GA  GA at prior assessment date 7 w + 1 d  GA by previous U/S 24 w + 3 d  OTILIA by previous Ultrasound: 2025  Ultrasound examination on: 2024  GA by U/S based upon: AC, BPD, Femur, HC  GA by U/S 25 w + 6 d  OTILIA by U/S: 3/28/2025  Assigned: based on the LMP, selected on 2024  Assigned GA 24 w + 4 d  Assigned OTILIA: 2025    Fetal Biometry  ============    Standard  BPD 66.2 mm 26w 5d 96% Hadlock  OFD 84.7 mm 27w 3d >99% Freddy  .5 mm 26w 1d 84% Hadlock  .1 mm 25w 4d 74% Hadlock  Femur 45.0 mm 24w 6d 46% Hadlock   g 25w 2d 79% Hadlock  EFW (lb) 1 lb  EFW (oz) 13 oz  EFW by: Hadlock (BPD-HC-AC-FL)  Other Structures   bpm    General Evaluation  ==============    Cardiac activity present.  bpm. Fetal movements: visualized. Presentation: Cephalic  Placenta: Placental site: posterior, appropriate distance from the internal os  Umbilical cord: Cord vessels: 3 vessel cord. Insertion site: central  Amniotic fluid: Amount of AF: normal    Fetal Anatomy  ===========    Cord insertion: normal  Stomach: normal  Kidneys: normal  Bladder: normal  Abdomen  Rt renal artery: normal  Lt renal artery: normal  Cervical spine: normal  Thoracic spine: normal  Lumbar spine: normal  Sacral spine: normal  Wants to know fetal sex: yes    Maternal Structures  ===============    Uterus / Cervix  Cervix: Normal  Approach: Transabdominal  Cervical length 3.76 cm    Findings  =======    Intrauterine Giron pregnancy at 24w 4d

## 2025-01-13 ENCOUNTER — ROUTINE PRENATAL (OUTPATIENT)
Age: 35
End: 2025-01-13
Payer: COMMERCIAL

## 2025-01-13 VITALS
HEART RATE: 116 BPM | BODY MASS INDEX: 32.61 KG/M2 | WEIGHT: 172.6 LBS | SYSTOLIC BLOOD PRESSURE: 116 MMHG | DIASTOLIC BLOOD PRESSURE: 75 MMHG

## 2025-01-13 DIAGNOSIS — R00.0 TACHYCARDIA: ICD-10-CM

## 2025-01-13 DIAGNOSIS — Z3A.28 28 WEEKS GESTATION OF PREGNANCY: ICD-10-CM

## 2025-01-13 DIAGNOSIS — Z23 NEED FOR VACCINATION: ICD-10-CM

## 2025-01-13 DIAGNOSIS — O09.529 SUPERVISION OF HIGH-RISK PREGNANCY OF ELDERLY MULTIGRAVIDA: Primary | ICD-10-CM

## 2025-01-13 PROCEDURE — 90471 IMMUNIZATION ADMIN: CPT | Performed by: OBSTETRICS & GYNECOLOGY

## 2025-01-13 PROCEDURE — 0502F SUBSEQUENT PRENATAL CARE: CPT | Performed by: OBSTETRICS & GYNECOLOGY

## 2025-01-13 PROCEDURE — 90715 TDAP VACCINE 7 YRS/> IM: CPT | Performed by: OBSTETRICS & GYNECOLOGY

## 2025-01-13 NOTE — PROGRESS NOTES
Baby moving  Feels like heart racing at times. - watch notified her heart rate was over 120   consent signed  Will have her see cardiology - hx of chemo  Already cut out caffeine, check TSH  Tdap today, glucola - Rh pos

## 2025-01-15 LAB
BASOPHILS # BLD: 0.03 K/UL (ref 0–0.1)
BASOPHILS NFR BLD: 0.3 % (ref 0–1)
DIFFERENTIAL METHOD BLD: ABNORMAL
EOSINOPHIL # BLD: 0.03 K/UL (ref 0–0.4)
EOSINOPHIL NFR BLD: 0.3 % (ref 0–7)
ERYTHROCYTE [DISTWIDTH] IN BLOOD BY AUTOMATED COUNT: 13.1 % (ref 11.5–14.5)
GLUCOSE 1H P 100 G GLC PO SERPL-MCNC: 110 MG/DL (ref 65–140)
HCT VFR BLD AUTO: 32.1 % (ref 35–47)
HGB BLD-MCNC: 10.9 G/DL (ref 11.5–16)
IMM GRANULOCYTES # BLD AUTO: 0.07 K/UL (ref 0–0.04)
IMM GRANULOCYTES NFR BLD AUTO: 0.8 % (ref 0–0.5)
LYMPHOCYTES # BLD: 1.45 K/UL (ref 0.8–3.5)
LYMPHOCYTES NFR BLD: 15.6 % (ref 12–49)
MCH RBC QN AUTO: 32.6 PG (ref 26–34)
MCHC RBC AUTO-ENTMCNC: 34 G/DL (ref 30–36.5)
MCV RBC AUTO: 96.1 FL (ref 80–99)
MONOCYTES # BLD: 0.6 K/UL (ref 0–1)
MONOCYTES NFR BLD: 6.4 % (ref 5–13)
NEUTS SEG # BLD: 7.14 K/UL (ref 1.8–8)
NEUTS SEG NFR BLD: 76.6 % (ref 32–75)
NRBC # BLD: 0 K/UL (ref 0–0.01)
NRBC BLD-RTO: 0 PER 100 WBC
PLATELET # BLD AUTO: 122 K/UL (ref 150–400)
PMV BLD AUTO: 12.3 FL (ref 8.9–12.9)
RBC # BLD AUTO: 3.34 M/UL (ref 3.8–5.2)
T4 FREE SERPL-MCNC: 0.9 NG/DL (ref 0.8–1.5)
TSH SERPL DL<=0.05 MIU/L-ACNC: 0.77 UIU/ML (ref 0.36–3.74)
WBC # BLD AUTO: 9.3 K/UL (ref 3.6–11)

## 2025-01-16 NOTE — PROGRESS NOTES
EDC 2025 D=US  NIPTS normal female  Horizon neg prior preg  AMA 36 at delivery - MFM 12 weeks  Desmoid fibromatosis - Batalo  Hx of CS breech, then  8# - desires   CHTN post chemo - no meds  Baseline PCR 0.1  Baby ASA 12 weeks  Hx of gestational thrombocytopenia  Varicella immune   GBS pos urine   Flu vaccine 24  Coccyx pain

## 2025-01-27 ENCOUNTER — ROUTINE PRENATAL (OUTPATIENT)
Age: 35
End: 2025-01-27

## 2025-01-27 VITALS
HEART RATE: 121 BPM | BODY MASS INDEX: 33.07 KG/M2 | DIASTOLIC BLOOD PRESSURE: 71 MMHG | WEIGHT: 175 LBS | SYSTOLIC BLOOD PRESSURE: 109 MMHG

## 2025-01-27 DIAGNOSIS — Z3A.30 30 WEEKS GESTATION OF PREGNANCY: ICD-10-CM

## 2025-01-27 DIAGNOSIS — D48.119 DESMOID TUMOR: ICD-10-CM

## 2025-01-27 DIAGNOSIS — O16.9 HYPERTENSION AFFECTING PREGNANCY, ANTEPARTUM: ICD-10-CM

## 2025-01-27 DIAGNOSIS — O09.529 SUPERVISION OF HIGH-RISK PREGNANCY OF ELDERLY MULTIGRAVIDA: Primary | ICD-10-CM

## 2025-01-27 PROCEDURE — 0502F SUBSEQUENT PRENATAL CARE: CPT | Performed by: OBSTETRICS & GYNECOLOGY

## 2025-01-27 NOTE — PROGRESS NOTES
Baby moving  Some pain in her back and feels a lump now - likely a recurrence of the desmoid tumor.  Has doc to fu with if needed. Declines pain meds  Will post IOL 39 weeks if not delivered 3/30-3/31

## 2025-02-10 ENCOUNTER — ROUTINE PRENATAL (OUTPATIENT)
Age: 35
End: 2025-02-10
Payer: COMMERCIAL

## 2025-02-10 VITALS — DIASTOLIC BLOOD PRESSURE: 79 MMHG | SYSTOLIC BLOOD PRESSURE: 121 MMHG | HEART RATE: 103 BPM

## 2025-02-10 DIAGNOSIS — Z3A.32 32 WEEKS GESTATION OF PREGNANCY: Primary | ICD-10-CM

## 2025-02-10 PROCEDURE — 76816 OB US FOLLOW-UP PER FETUS: CPT | Performed by: STUDENT IN AN ORGANIZED HEALTH CARE EDUCATION/TRAINING PROGRAM

## 2025-02-10 PROCEDURE — 99214 OFFICE O/P EST MOD 30 MIN: CPT | Performed by: STUDENT IN AN ORGANIZED HEALTH CARE EDUCATION/TRAINING PROGRAM

## 2025-02-10 NOTE — PROCEDURES
surveillance for recurrence is advised as planned.    Recommendations  ==============    - Serial growth scans q4 weeks  - Once Weekly Antepartum Testing starting at 37 weeks  - Delivery recommended b/w 39.0 and 40.6    Coding  ======    Code: O09.523  Description: Supervision of elderly multigravida  Code: O34.211  Description: Maternal care for low transverse scar from previous  delivery  Code: Z3A.32  Description: Weeks of gestation  Code: 61476  Description: Ultrasound, pregnant uterus, real time with image documentation, follow up, transabdominal approach per fetus

## 2025-02-10 NOTE — PROGRESS NOTES
Patient was seen 2/10/2025      Please look under media to view full consult and ultrasound report in ViewPoint.         Yomaira Levine MD   Maternal Fetal Medicine

## 2025-02-13 NOTE — PROGRESS NOTES
EDC 2025 D=US  NIPTS normal female  Horizon neg prior preg  AMA 36 at delivery - MFM 12 weeks  Desmoid fibromatosis - Batalo  Hx of CS breech, then  8# - desires   CHTN post chemo - no meds  Baseline PCR 0.1  Baby ASA 12 weeks  Hx of gestational thrombocytopenia - 122k at 28 weeks, retest 34 weeks  Varicella immune   GBS pos urine   Flu vaccine 24  Coccyx pain

## 2025-02-18 ENCOUNTER — ROUTINE PRENATAL (OUTPATIENT)
Age: 35
End: 2025-02-18

## 2025-02-18 VITALS
WEIGHT: 178.6 LBS | DIASTOLIC BLOOD PRESSURE: 76 MMHG | HEART RATE: 100 BPM | BODY MASS INDEX: 33.75 KG/M2 | SYSTOLIC BLOOD PRESSURE: 117 MMHG

## 2025-02-18 DIAGNOSIS — D48.119 DESMOID TUMOR: ICD-10-CM

## 2025-02-18 DIAGNOSIS — O09.529 SUPERVISION OF HIGH-RISK PREGNANCY OF ELDERLY MULTIGRAVIDA: Primary | ICD-10-CM

## 2025-02-18 DIAGNOSIS — O16.9 HYPERTENSION AFFECTING PREGNANCY, ANTEPARTUM: ICD-10-CM

## 2025-02-18 DIAGNOSIS — Z3A.33 33 WEEKS GESTATION OF PREGNANCY: ICD-10-CM

## 2025-02-18 PROCEDURE — 0502F SUBSEQUENT PRENATAL CARE: CPT | Performed by: OBSTETRICS & GYNECOLOGY

## 2025-02-18 NOTE — PROGRESS NOTES
Baby moving  Lots of pain from Desmoid - she has been in contact with oncologist. Has narcotics if she needs them  Disc possible salpingectomy if has CS - tumor is hormone responsive. She will discuss with  - has been advised by onc no more kids - GM with breast cancer    Recheck plts today - hx of gest thrombocytopenia

## 2025-02-19 LAB
BASOPHILS # BLD: 0.02 K/UL (ref 0–0.1)
BASOPHILS NFR BLD: 0.3 % (ref 0–1)
DIFFERENTIAL METHOD BLD: ABNORMAL
EOSINOPHIL # BLD: 0.02 K/UL (ref 0–0.4)
EOSINOPHIL NFR BLD: 0.3 % (ref 0–7)
ERYTHROCYTE [DISTWIDTH] IN BLOOD BY AUTOMATED COUNT: 14.6 % (ref 11.5–14.5)
HCT VFR BLD AUTO: 36.5 % (ref 35–47)
HGB BLD-MCNC: 11.8 G/DL (ref 11.5–16)
IMM GRANULOCYTES # BLD AUTO: 0.07 K/UL (ref 0–0.04)
IMM GRANULOCYTES NFR BLD AUTO: 0.9 % (ref 0–0.5)
LYMPHOCYTES # BLD: 1.43 K/UL (ref 0.8–3.5)
LYMPHOCYTES NFR BLD: 18.6 % (ref 12–49)
MCH RBC QN AUTO: 32.2 PG (ref 26–34)
MCHC RBC AUTO-ENTMCNC: 32.3 G/DL (ref 30–36.5)
MCV RBC AUTO: 99.5 FL (ref 80–99)
MONOCYTES # BLD: 0.7 K/UL (ref 0–1)
MONOCYTES NFR BLD: 9.1 % (ref 5–13)
NEUTS SEG # BLD: 5.45 K/UL (ref 1.8–8)
NEUTS SEG NFR BLD: 70.8 % (ref 32–75)
NRBC # BLD: 0 K/UL (ref 0–0.01)
NRBC BLD-RTO: 0 PER 100 WBC
PLATELET # BLD AUTO: 111 K/UL (ref 150–400)
PMV BLD AUTO: 12.8 FL (ref 8.9–12.9)
RBC # BLD AUTO: 3.67 M/UL (ref 3.8–5.2)
WBC # BLD AUTO: 7.7 K/UL (ref 3.6–11)

## 2025-03-03 NOTE — PROGRESS NOTES
Patient Active Problem List    Diagnosis Date Noted    Supervision of high-risk pregnancy of elderly multigravida 2024     Overview Note:     EDC 2025 D=US  NIPTS normal female  Horizon neg prior preg  AMA 36 at delivery - MFM 12 weeks  Desmoid fibromatosis - Batalo  Hx of CS breech, then  8# - desires   CHTN post chemo - no meds  Baseline PCR 0.1  Baby ASA 12 weeks  Hx of gestational thrombocytopenia - 122k at 28 weeks, retest 34 weeks  Varicella immune   GBS pos urine   Flu vaccine 24  Coccyx pain

## 2025-03-04 ENCOUNTER — ROUTINE PRENATAL (OUTPATIENT)
Age: 35
End: 2025-03-04

## 2025-03-04 VITALS
HEART RATE: 116 BPM | SYSTOLIC BLOOD PRESSURE: 115 MMHG | DIASTOLIC BLOOD PRESSURE: 72 MMHG | WEIGHT: 183.2 LBS | BODY MASS INDEX: 34.62 KG/M2

## 2025-03-04 DIAGNOSIS — Z3A.35 35 WEEKS GESTATION OF PREGNANCY: ICD-10-CM

## 2025-03-04 DIAGNOSIS — O16.9 HYPERTENSION AFFECTING PREGNANCY, ANTEPARTUM: ICD-10-CM

## 2025-03-04 DIAGNOSIS — O99.113 BENIGN GESTATIONAL THROMBOCYTOPENIA IN THIRD TRIMESTER: ICD-10-CM

## 2025-03-04 DIAGNOSIS — O09.529 SUPERVISION OF HIGH-RISK PREGNANCY OF ELDERLY MULTIGRAVIDA: Primary | ICD-10-CM

## 2025-03-04 DIAGNOSIS — D48.119 DESMOID TUMOR: ICD-10-CM

## 2025-03-04 DIAGNOSIS — D69.6 BENIGN GESTATIONAL THROMBOCYTOPENIA IN THIRD TRIMESTER: ICD-10-CM

## 2025-03-04 LAB — RUBELLA TITER, EXTERNAL RESULT: NORMAL

## 2025-03-04 PROCEDURE — 0502F SUBSEQUENT PRENATAL CARE: CPT | Performed by: OBSTETRICS & GYNECOLOGY

## 2025-03-04 NOTE — PROGRESS NOTES
Baby moving  Plts 111K - recheck today with measles Igg  GBS today    Wants tubes removed if CS - GM with breast cancer. Will contact ethics

## 2025-03-05 LAB
BASOPHILS # BLD: 0.01 K/UL (ref 0–0.1)
BASOPHILS NFR BLD: 0.1 % (ref 0–1)
DIFFERENTIAL METHOD BLD: ABNORMAL
EOSINOPHIL # BLD: 0.02 K/UL (ref 0–0.4)
EOSINOPHIL NFR BLD: 0.3 % (ref 0–7)
ERYTHROCYTE [DISTWIDTH] IN BLOOD BY AUTOMATED COUNT: 14 % (ref 11.5–14.5)
HCT VFR BLD AUTO: 36.4 % (ref 35–47)
HGB BLD-MCNC: 12 G/DL (ref 11.5–16)
IMM GRANULOCYTES # BLD AUTO: 0.08 K/UL (ref 0–0.04)
IMM GRANULOCYTES NFR BLD AUTO: 1 % (ref 0–0.5)
LYMPHOCYTES # BLD: 1.39 K/UL (ref 0.8–3.5)
LYMPHOCYTES NFR BLD: 17.6 % (ref 12–49)
MCH RBC QN AUTO: 32.3 PG (ref 26–34)
MCHC RBC AUTO-ENTMCNC: 33 G/DL (ref 30–36.5)
MCV RBC AUTO: 97.8 FL (ref 80–99)
MONOCYTES # BLD: 0.63 K/UL (ref 0–1)
MONOCYTES NFR BLD: 8 % (ref 5–13)
NEUTS SEG # BLD: 5.77 K/UL (ref 1.8–8)
NEUTS SEG NFR BLD: 73 % (ref 32–75)
NRBC # BLD: 0 K/UL (ref 0–0.01)
NRBC BLD-RTO: 0 PER 100 WBC
PLATELET # BLD AUTO: 95 K/UL (ref 150–400)
PMV BLD AUTO: 12.9 FL (ref 8.9–12.9)
RBC # BLD AUTO: 3.72 M/UL (ref 3.8–5.2)
RBC MORPH BLD: ABNORMAL
WBC # BLD AUTO: 7.9 K/UL (ref 3.6–11)

## 2025-03-06 ENCOUNTER — OFFICE VISIT (OUTPATIENT)
Age: 35
End: 2025-03-06
Payer: COMMERCIAL

## 2025-03-06 VITALS
BODY MASS INDEX: 34.66 KG/M2 | DIASTOLIC BLOOD PRESSURE: 60 MMHG | HEIGHT: 61 IN | WEIGHT: 183.6 LBS | HEART RATE: 112 BPM | SYSTOLIC BLOOD PRESSURE: 102 MMHG

## 2025-03-06 DIAGNOSIS — Z34.90 PREGNANCY, UNSPECIFIED GESTATIONAL AGE: Primary | ICD-10-CM

## 2025-03-06 DIAGNOSIS — R00.0 TACHYCARDIA: Primary | ICD-10-CM

## 2025-03-06 LAB
GBS, EXTERNAL RESULT: POSITIVE
GP B STREP DNA SPEC QL NAA+PROBE: POSITIVE

## 2025-03-06 PROCEDURE — 93000 ELECTROCARDIOGRAM COMPLETE: CPT | Performed by: INTERNAL MEDICINE

## 2025-03-06 PROCEDURE — 99204 OFFICE O/P NEW MOD 45 MIN: CPT | Performed by: INTERNAL MEDICINE

## 2025-03-06 NOTE — PROGRESS NOTES
/60   Pulse (!) 112   Ht 1.549 m (5' 1\")   Wt 83.3 kg (183 lb 9.6 oz)   LMP 06/30/2024 (Exact Date)   BMI 34.69 kg/m²     35 1/2 weeks pregnant  
results found for: \"CHOL\", \"CHOLPOCT\", \"HDL\", \"LDL\", \"LDLC\", \"LDLCEXT\"  Lab Results   Component Value Date/Time    ALT 19 08/20/2024 03:38 PM    PLT 95 03/04/2025 02:27 PM    AFP 22.8 10/14/2024 02:06 PM     No results found for: \"INR\", \"INREXT\", \"PT1\"   Lab Results   Component Value Date/Time    BUN 12 08/20/2024 03:38 PM     08/20/2024 03:38 PM    K 3.6 08/20/2024 03:38 PM     08/20/2024 03:38 PM    CO2 29 08/20/2024 03:38 PM     No results found for: \"PSA\", \"PSA2\", \"PSAR1\", \"PSA1\", \"PSA3\"  Lab Results   Component Value Date/Time    TSH 0.77 01/13/2025 03:38 PM      No results found for: \"GLU\", \"GLUCPOC\"   No results found for: \"CPK\", \"CKMB\", \"BNP\"   No results found for: \"BNP\", \"BNPPOC\", \"BNPNT\"   Lab Results   Component Value Date/Time     08/20/2024 03:38 PM    K 3.6 08/20/2024 03:38 PM     08/20/2024 03:38 PM    CO2 29 08/20/2024 03:38 PM    BUN 12 08/20/2024 03:38 PM      Lab Results   Component Value Date/Time     08/20/2024 03:38 PM    K 3.6 08/20/2024 03:38 PM     08/20/2024 03:38 PM    CO2 29 08/20/2024 03:38 PM    BUN 12 08/20/2024 03:38 PM    ALT 19 08/20/2024 03:38 PM    GLOB 2.9 08/20/2024 03:38 PM      No results found for: \"HBA1C\", \"MIJ9QYIE\"      No results for input(s): \"CPK\", \"CKMB\" in the last 72 hours.    Invalid input(s): \"CKQMB\", \"CPKMB\", \"TROIQ\"        Problem List:     [unfilled]      [facility] ADAN Smith MD, [facility]

## 2025-03-07 LAB
MEV IGG SER IA-ACNC: >300 AU/ML
MUV IGG SER IA-ACNC: 119 AU/ML
RUBV IGG SERPL IA-ACNC: 1.64 INDEX

## 2025-03-10 ENCOUNTER — ROUTINE PRENATAL (OUTPATIENT)
Age: 35
End: 2025-03-10
Payer: COMMERCIAL

## 2025-03-10 ENCOUNTER — PATIENT MESSAGE (OUTPATIENT)
Age: 35
End: 2025-03-10

## 2025-03-10 VITALS — HEART RATE: 116 BPM | SYSTOLIC BLOOD PRESSURE: 115 MMHG | DIASTOLIC BLOOD PRESSURE: 79 MMHG

## 2025-03-10 DIAGNOSIS — Z34.90 PREGNANCY, UNSPECIFIED GESTATIONAL AGE: ICD-10-CM

## 2025-03-10 DIAGNOSIS — D48.119 DESMOID TUMOR: ICD-10-CM

## 2025-03-10 DIAGNOSIS — O09.529 SUPERVISION OF HIGH-RISK PREGNANCY OF ELDERLY MULTIGRAVIDA: Primary | ICD-10-CM

## 2025-03-10 PROCEDURE — 99214 OFFICE O/P EST MOD 30 MIN: CPT | Performed by: OBSTETRICS & GYNECOLOGY

## 2025-03-10 PROCEDURE — 59025 FETAL NON-STRESS TEST: CPT | Performed by: OBSTETRICS & GYNECOLOGY

## 2025-03-10 PROCEDURE — 76816 OB US FOLLOW-UP PER FETUS: CPT | Performed by: OBSTETRICS & GYNECOLOGY

## 2025-03-10 NOTE — PROCEDURES
PATIENT: CATE JOE   -  : 1990   -  DOS:03/10/2025   -  INTERPRETING PROVIDER:Tai Taveras,   Indication  ========    AMA, history of Desmoid tumor.    Method  ======    Transabdominal ultrasound examination and external fetal monitor. View: Sufficient    Pregnancy  =========    Giron pregnancy. Number of fetuses: 1    Dating  ======    LMP on: 2024  Cycle: regular cycle  GA by LMP 36 w + 1 d  OTILIA by LMP: 2025  Previous Ultrasound on: 2024  Type of prior assessment: GA  GA at prior assessment date 7 w + 1 d  GA by previous U/S 36 w + 0 d  OTILIA by previous Ultrasound: 2025  Ultrasound examination on: 3/10/2025  GA by U/S based upon: AC, BPD, Femur, HC  GA by U/S 37 w + 3 d  OTILIA by U/S: 3/28/2025  Assigned: based on the LMP, selected on 2024  Assigned GA 36 w + 1 d  Assigned OTILIA: 2025    Fetal Biometry  ============    Standard  BPD 95.0 mm 38w 5d 98% Hadlock  .3 mm 38w 5d 83% Freddy  .0 mm 38w 1d 70% Hadlock  .8 mm 37w 2d 87% Hadlock  Femur 69.3 mm 35w 4d 31% Hadlock  EFW 3,126 g 37w 3d 77% Hadlock  EFW (lb) 6 lb  EFW (oz) 14 oz  EFW by: Hadlock (BPD-HC-AC-FL)  Other Structures   bpm    General Evaluation  ==============    Cardiac activity present.  bpm. Fetal movements: visualized. Presentation: Cephalic  Placenta: Placental site: posterior, appropriate distance from the internal os  Umbilical cord: Cord vessels: 3 vessel cord. Insertion site: central  Amniotic fluid: Amount of AF: normal. MVP 5.7 cm. FISH 17.5 cm. Q1 3.0 cm, Q2 5.2 cm, Q3 5.7 cm, Q4 3.6 cm    Fetal Anatomy  ===========    Stomach: normal  Kidneys: normal  Bladder: normal  Wants to know fetal sex: yes    Non Stress Test  =============    NST interpretation: reactive. Test duration 20 min. Baseline  bpm. Baseline variability: moderate. Accelerations: present. Decelerations: absent. Uterine activity:  absent    Findings  =======    Intrauterine Giron

## 2025-03-10 NOTE — PROGRESS NOTES
Patient was seen 3/10/2025      Please look under media to view full consult and ultrasound report in ViewPoint.       Tai Taveras MD  Maternal Fetal Medicine

## 2025-03-11 ENCOUNTER — ROUTINE PRENATAL (OUTPATIENT)
Age: 35
End: 2025-03-11

## 2025-03-11 VITALS
DIASTOLIC BLOOD PRESSURE: 81 MMHG | HEART RATE: 100 BPM | WEIGHT: 186.2 LBS | SYSTOLIC BLOOD PRESSURE: 120 MMHG | BODY MASS INDEX: 35.18 KG/M2

## 2025-03-11 DIAGNOSIS — Z3A.36 36 WEEKS GESTATION OF PREGNANCY: ICD-10-CM

## 2025-03-11 DIAGNOSIS — Z36.9 ENCOUNTER FOR ANTENATAL SCREENING OF MOTHER: ICD-10-CM

## 2025-03-11 DIAGNOSIS — O99.113 BENIGN GESTATIONAL THROMBOCYTOPENIA IN THIRD TRIMESTER: ICD-10-CM

## 2025-03-11 DIAGNOSIS — O16.9 HYPERTENSION AFFECTING PREGNANCY, ANTEPARTUM: ICD-10-CM

## 2025-03-11 DIAGNOSIS — D69.6 BENIGN GESTATIONAL THROMBOCYTOPENIA IN THIRD TRIMESTER: ICD-10-CM

## 2025-03-11 DIAGNOSIS — D48.119 DESMOID TUMOR: ICD-10-CM

## 2025-03-11 DIAGNOSIS — O09.529 SUPERVISION OF HIGH-RISK PREGNANCY OF ELDERLY MULTIGRAVIDA: Primary | ICD-10-CM

## 2025-03-11 PROCEDURE — 0502F SUBSEQUENT PRENATAL CARE: CPT | Performed by: OBSTETRICS & GYNECOLOGY

## 2025-03-11 NOTE — PROGRESS NOTES
Baby moving  She saw DESMOND yesterday who suggested that she have a repeat MRI done now without contrast and if the desmoid tumor is growing that she should be delivered at 37 weeks.  Patient states is obvious that the tumor has grown because you can now see it on her back.  She contacted her oncologist at [facility] who did not feel the MRI was going to be helpful as they were going to want their own MRI which is already scheduled right after delivery.  The oncologist also stated that doing the MRI without contrast was also not going to be helpful.  Patient does not want to do the MRI at this time and does not want to change the plan.  The hope is that the tumor will regress.  She is having pain but is not every day. With shared decision making and input from oncology at [facility] we will not do the MRI.     Last platelet 95k.  Will repeat today and add to check for clumping.  She may need steroids prior to delivery    GBS pos  Cervix 50/2

## 2025-03-11 NOTE — PROGRESS NOTES
Patient Active Problem List    Diagnosis Date Noted    Supervision of high-risk pregnancy of elderly multigravida 2024     Overview Note:     EDC 2025 D=US  NIPTS normal female  Horizon neg prior preg  AMA 36 at delivery - MFM 12 weeks  Desmoid fibromatosis - Batalo  Hx of CS breech, then  8# - desires   CHTN post chemo - no meds  Baseline PCR 0.1  Baby ASA 12 weeks  Hx of gestational thrombocytopenia - 122k at 28 weeks, retest 34 weeks  Varicella immune   GBS pos urine   Flu vaccine 24  Coccyx pain  Measles immune

## 2025-03-12 ENCOUNTER — TELEPHONE (OUTPATIENT)
Age: 35
End: 2025-03-12

## 2025-03-12 ENCOUNTER — RESULTS FOLLOW-UP (OUTPATIENT)
Age: 35
End: 2025-03-12

## 2025-03-12 LAB
ERYTHROCYTE [DISTWIDTH] IN BLOOD BY AUTOMATED COUNT: 13.9 % (ref 11.5–14.5)
HCT VFR BLD AUTO: 36.5 % (ref 35–47)
HGB BLD-MCNC: 12 G/DL (ref 11.5–16)
MCH RBC QN AUTO: 32.3 PG (ref 26–34)
MCHC RBC AUTO-ENTMCNC: 32.9 G/DL (ref 30–36.5)
MCV RBC AUTO: 98.1 FL (ref 80–99)
NRBC # BLD: 0 K/UL (ref 0–0.01)
NRBC BLD-RTO: 0 PER 100 WBC
PLATELET # BLD AUTO: 84 K/UL (ref 150–400)
PLATELET # BLD AUTO: 89 K/UL (ref 150–400)
PMV BLD AUTO: 13 FL (ref 8.9–12.9)
RBC # BLD AUTO: 3.72 M/UL (ref 3.8–5.2)
WBC # BLD AUTO: 7.7 K/UL (ref 3.6–11)

## 2025-03-12 NOTE — TELEPHONE ENCOUNTER
Two patient identifiers used    34 year old patient  36w3d pregnant.    [facility] pharmacy calling to discuss recent prescription that has been sent   dexAMETHasone 20 MG TABS [[id number]]    Order Details  Dose: 40 mg Route: Oral Frequency: DAILY   Dispense Quantity: 8 tablet Refills: 0          Sig: Take 40 mg by mouth daily ( 2 pills)         Start Date: 25 End Date: --   Written Date: 25 Expiration Date: 26   Providers    Pharmacy calling to say that they are not able to get the 20 mg dose but can do the 4 mg dosed and patient would take 10 tabs for 4 days.     Dr. Marte give verbal permission for the prescription to be dispensed as 40 mg ( 4 mg x10 tabs) for 4 days, total dispense of 40 tabs.    Pharmacy was advised of MD verbal order and verbalized understanding.

## 2025-03-17 ENCOUNTER — ROUTINE PRENATAL (OUTPATIENT)
Age: 35
End: 2025-03-17

## 2025-03-17 ENCOUNTER — ROUTINE PRENATAL (OUTPATIENT)
Age: 35
End: 2025-03-17
Payer: COMMERCIAL

## 2025-03-17 VITALS — DIASTOLIC BLOOD PRESSURE: 69 MMHG | WEIGHT: 186.8 LBS | BODY MASS INDEX: 35.3 KG/M2 | SYSTOLIC BLOOD PRESSURE: 103 MMHG

## 2025-03-17 VITALS — HEART RATE: 108 BPM | SYSTOLIC BLOOD PRESSURE: 127 MMHG | DIASTOLIC BLOOD PRESSURE: 79 MMHG

## 2025-03-17 DIAGNOSIS — O99.113 BENIGN GESTATIONAL THROMBOCYTOPENIA IN THIRD TRIMESTER: ICD-10-CM

## 2025-03-17 DIAGNOSIS — D48.119 DESMOID TUMOR: ICD-10-CM

## 2025-03-17 DIAGNOSIS — Z3A.37 37 WEEKS GESTATION OF PREGNANCY: ICD-10-CM

## 2025-03-17 DIAGNOSIS — O09.529 SUPERVISION OF HIGH-RISK PREGNANCY OF ELDERLY MULTIGRAVIDA: Primary | ICD-10-CM

## 2025-03-17 DIAGNOSIS — O16.9 HYPERTENSION AFFECTING PREGNANCY, ANTEPARTUM: ICD-10-CM

## 2025-03-17 DIAGNOSIS — D69.6 BENIGN GESTATIONAL THROMBOCYTOPENIA IN THIRD TRIMESTER: ICD-10-CM

## 2025-03-17 DIAGNOSIS — Z34.90 PREGNANCY, UNSPECIFIED GESTATIONAL AGE: ICD-10-CM

## 2025-03-17 LAB
BASOPHILS # BLD: 0 K/UL (ref 0–0.1)
BASOPHILS NFR BLD: 0 % (ref 0–1)
DIFFERENTIAL METHOD BLD: ABNORMAL
EOSINOPHIL # BLD: 0 K/UL (ref 0–0.4)
EOSINOPHIL NFR BLD: 0 % (ref 0–7)
ERYTHROCYTE [DISTWIDTH] IN BLOOD BY AUTOMATED COUNT: 13.9 % (ref 11.5–14.5)
HCT VFR BLD AUTO: 33 % (ref 35–47)
HGB BLD-MCNC: 10.9 G/DL (ref 11.5–16)
IMM GRANULOCYTES # BLD AUTO: 0 K/UL
IMM GRANULOCYTES NFR BLD AUTO: 0 %
LYMPHOCYTES # BLD: 2 K/UL (ref 0.8–3.5)
LYMPHOCYTES NFR BLD: 20 % (ref 12–49)
MCH RBC QN AUTO: 32.6 PG (ref 26–34)
MCHC RBC AUTO-ENTMCNC: 33 G/DL (ref 30–36.5)
MCV RBC AUTO: 98.8 FL (ref 80–99)
MONOCYTES # BLD: 0.4 K/UL (ref 0–1)
MONOCYTES NFR BLD: 4 % (ref 5–13)
MYELOCYTES NFR BLD MANUAL: 1 %
NEUTS SEG # BLD: 7.5 K/UL (ref 1.8–8)
NEUTS SEG NFR BLD: 75 % (ref 32–75)
NRBC # BLD: 0.17 K/UL (ref 0–0.01)
NRBC BLD-RTO: 1.7 PER 100 WBC
PLATELET # BLD AUTO: 115 K/UL (ref 150–400)
PMV BLD AUTO: 12.9 FL (ref 8.9–12.9)
RBC # BLD AUTO: 3.34 M/UL (ref 3.8–5.2)
RBC MORPH BLD: ABNORMAL
WBC # BLD AUTO: 10 K/UL (ref 3.6–11)

## 2025-03-17 PROCEDURE — 99214 OFFICE O/P EST MOD 30 MIN: CPT | Performed by: STUDENT IN AN ORGANIZED HEALTH CARE EDUCATION/TRAINING PROGRAM

## 2025-03-17 PROCEDURE — 0502F SUBSEQUENT PRENATAL CARE: CPT | Performed by: OBSTETRICS & GYNECOLOGY

## 2025-03-17 PROCEDURE — 76819 FETAL BIOPHYS PROFIL W/O NST: CPT | Performed by: STUDENT IN AN ORGANIZED HEALTH CARE EDUCATION/TRAINING PROGRAM

## 2025-03-17 ASSESSMENT — PATIENT HEALTH QUESTIONNAIRE - PHQ9
2. FEELING DOWN, DEPRESSED OR HOPELESS: NOT AT ALL
SUM OF ALL RESPONSES TO PHQ QUESTIONS 1-9: 0
1. LITTLE INTEREST OR PLEASURE IN DOING THINGS: NOT AT ALL
SUM OF ALL RESPONSES TO PHQ QUESTIONS 1-9: 0

## 2025-03-17 NOTE — PROGRESS NOTES
Patient was seen 3/17/2025      Please look under media to view full consult and ultrasound report in ViewPoint.         Yomaira Levine MD   Maternal Fetal Medicine

## 2025-03-17 NOTE — PROGRESS NOTES
Baby moving  Had manual plt count last week that was basically same as the regular  Started on dexameth 40mg x 4 days  Scheduled for IOL in 3 days due to enlarging desmoid tumor.   GBS pos

## 2025-03-17 NOTE — PROGRESS NOTES
Patient Active Problem List    Diagnosis Date Noted    Supervision of high-risk pregnancy of elderly multigravida 2024     Overview Note:     EDC 2025 D=US  NIPTS normal female  Horizon neg prior preg  AMA 36 at delivery - MFM 12 weeks  Desmoid fibromatosis - Batalo  Hx of CS breech, then  8# - desires   CHTN post chemo - no meds  Baseline PCR 0.1  Baby ASA 12 weeks  Hx of gestational thrombocytopenia - 95k 35 weeks  Varicella immune   GBS pos urine   Flu vaccine 24  Coccyx pain  Measles immune  Bilateral salpingectomy if has CS approved Mae 3/5/25

## 2025-03-17 NOTE — PROCEDURES
PATIENT: CATE JOE   -  : 1990   -  DOS:2025   -  INTERPRETING PROVIDER:Yomaira Levine,   Indication  ========    AMA, history of Desmoid tumor.    Method  ======    Transabdominal ultrasound examination. View: Sufficient    Pregnancy  =========    Giron pregnancy. Number of fetuses: 1    Dating  ======    LMP on: 2024  Cycle: regular cycle  GA by LMP 37 w + 1 d  OTILIA by LMP: 2025  Previous Ultrasound on: 2024  Type of prior assessment: GA  GA at prior assessment date 7 w + 1 d  GA by previous U/S 37 w + 0 d  OTILIA by previous Ultrasound: 2025  Assigned: based on the LMP, selected on 2024  Assigned GA 37 w + 1 d  Assigned OTILIA: 2025    General Evaluation  ==============    Cardiac activity present.  bpm. Fetal movements: visualized. Presentation: Cephalic  Placenta: Placental site: posterior, appropriate distance from the internal os  Umbilical cord: Cord vessels: 3 vessel cord    Fetal Anatomy  ===========    Stomach: normal  Kidneys: normal  Bladder: normal  Wants to know fetal sex: yes    Amniotic Fluid Assessment  =====================    Amount of AF: normal  MVP 4.4 cm. FISH 9.7 cm. Q1 4.4 cm, Q2 0.0 cm, Q3 3.0 cm, Q4 2.3 cm    Biophysical Profile  ==============    2: Fetal breathing movements  2: Gross body movements  2: Fetal tone  2: Amniotic fluid volume  8/8 Biophysical profile score    Findings  =======    Intrauterine Giron pregnancy at 37w 1d by clinical dates.  Amniotic fluid: normal.  Placenta is posterior, appropriate distance from the internal os.  Cephalic presentation.  Biophysical profile score is 8/8.    The ultrasound findings as listed above and diagnostic limitations of ultrasound imaging, including inability to exclude all anomalies, have been reviewed with the patient. All  questions and concerns addressed.    Consultation  ==========    CATE is 35 yrs of age,  at 37w 1d.    Patient presented today for a BPP. The

## 2025-03-18 ENCOUNTER — RESULTS FOLLOW-UP (OUTPATIENT)
Age: 35
End: 2025-03-18

## 2025-03-20 ENCOUNTER — ANESTHESIA EVENT (OUTPATIENT)
Facility: HOSPITAL | Age: 35
End: 2025-03-20
Payer: COMMERCIAL

## 2025-03-20 ENCOUNTER — HOSPITAL ENCOUNTER (INPATIENT)
Facility: HOSPITAL | Age: 35
LOS: 1 days | Discharge: HOME | End: 2025-03-21
Attending: OBSTETRICS & GYNECOLOGY | Admitting: OBSTETRICS & GYNECOLOGY
Payer: COMMERCIAL

## 2025-03-20 ENCOUNTER — ANESTHESIA (OUTPATIENT)
Facility: HOSPITAL | Age: 35
End: 2025-03-20
Payer: COMMERCIAL

## 2025-03-20 PROBLEM — Z34.90 ENCOUNTER FOR ELECTIVE INDUCTION OF LABOR: Status: ACTIVE | Noted: 2025-03-20

## 2025-03-20 LAB
ABO + RH BLD: NORMAL
BASOPHILS # BLD: 0.01 K/UL (ref 0–0.1)
BASOPHILS NFR BLD: 0.1 % (ref 0–1)
BLOOD GROUP ANTIBODIES SERPL: NORMAL
DIFFERENTIAL METHOD BLD: ABNORMAL
EOSINOPHIL # BLD: 0.04 K/UL (ref 0–0.4)
EOSINOPHIL NFR BLD: 0.4 % (ref 0–7)
ERYTHROCYTE [DISTWIDTH] IN BLOOD BY AUTOMATED COUNT: 13.6 % (ref 11.5–14.5)
HCT VFR BLD AUTO: 33.5 % (ref 35–47)
HGB BLD-MCNC: 11.6 G/DL (ref 11.5–16)
IMM GRANULOCYTES # BLD AUTO: 0.15 K/UL (ref 0–0.04)
IMM GRANULOCYTES NFR BLD AUTO: 1.6 % (ref 0–0.5)
LYMPHOCYTES # BLD: 2.03 K/UL (ref 0.8–3.5)
LYMPHOCYTES NFR BLD: 22 % (ref 12–49)
MCH RBC QN AUTO: 32.2 PG (ref 26–34)
MCHC RBC AUTO-ENTMCNC: 34.6 G/DL (ref 30–36.5)
MCV RBC AUTO: 93.1 FL (ref 80–99)
MONOCYTES # BLD: 0.72 K/UL (ref 0–1)
MONOCYTES NFR BLD: 7.8 % (ref 5–13)
NEUTS SEG # BLD: 6.27 K/UL (ref 1.8–8)
NEUTS SEG NFR BLD: 68.1 % (ref 32–75)
NRBC # BLD: 0 K/UL (ref 0–0.01)
NRBC BLD-RTO: 0 PER 100 WBC
PLATELET # BLD AUTO: 111 K/UL (ref 150–400)
PMV BLD AUTO: 12.2 FL (ref 8.9–12.9)
RBC # BLD AUTO: 3.6 M/UL (ref 3.8–5.2)
RPR SER QL: NONREACTIVE
SPECIMEN EXP DATE BLD: NORMAL
WBC # BLD AUTO: 9.2 K/UL (ref 3.6–11)

## 2025-03-20 PROCEDURE — 7220000101 HC DELIVERY VAGINAL/SINGLE: Performed by: OBSTETRICS & GYNECOLOGY

## 2025-03-20 PROCEDURE — 6360000002 HC RX W HCPCS: Performed by: ANESTHESIOLOGY

## 2025-03-20 PROCEDURE — 7210000100 HC LABOR FEE PER 1 HR: Performed by: OBSTETRICS & GYNECOLOGY

## 2025-03-20 PROCEDURE — 6370000000 HC RX 637 (ALT 250 FOR IP): Performed by: OBSTETRICS & GYNECOLOGY

## 2025-03-20 PROCEDURE — 86901 BLOOD TYPING SEROLOGIC RH(D): CPT

## 2025-03-20 PROCEDURE — 3700000025 EPIDURAL BLOCK: Performed by: ANESTHESIOLOGY

## 2025-03-20 PROCEDURE — 2580000003 HC RX 258: Performed by: OBSTETRICS & GYNECOLOGY

## 2025-03-20 PROCEDURE — 94761 N-INVAS EAR/PLS OXIMETRY MLT: CPT

## 2025-03-20 PROCEDURE — 86780 TREPONEMA PALLIDUM: CPT

## 2025-03-20 PROCEDURE — 2500000003 HC RX 250 WO HCPCS: Performed by: ANESTHESIOLOGY

## 2025-03-20 PROCEDURE — 86850 RBC ANTIBODY SCREEN: CPT

## 2025-03-20 PROCEDURE — 36415 COLL VENOUS BLD VENIPUNCTURE: CPT

## 2025-03-20 PROCEDURE — 3700000156 HC EPIDURAL ANESTHESIA: Performed by: NURSE ANESTHETIST, CERTIFIED REGISTERED

## 2025-03-20 PROCEDURE — 6360000002 HC RX W HCPCS: Performed by: OBSTETRICS & GYNECOLOGY

## 2025-03-20 PROCEDURE — 1120000000 HC RM PRIVATE OB

## 2025-03-20 PROCEDURE — 86900 BLOOD TYPING SEROLOGIC ABO: CPT

## 2025-03-20 PROCEDURE — 2500000003 HC RX 250 WO HCPCS: Performed by: OBSTETRICS & GYNECOLOGY

## 2025-03-20 PROCEDURE — 86592 SYPHILIS TEST NON-TREP QUAL: CPT

## 2025-03-20 PROCEDURE — 85025 COMPLETE CBC W/AUTO DIFF WBC: CPT

## 2025-03-20 RX ORDER — SODIUM CHLORIDE 0.9 % (FLUSH) 0.9 %
5-40 SYRINGE (ML) INJECTION PRN
Status: DISCONTINUED | OUTPATIENT
Start: 2025-03-20 | End: 2025-03-21 | Stop reason: HOSPADM

## 2025-03-20 RX ORDER — IBUPROFEN 800 MG/1
800 TABLET, FILM COATED ORAL EVERY 8 HOURS SCHEDULED
Status: DISCONTINUED | OUTPATIENT
Start: 2025-03-20 | End: 2025-03-21 | Stop reason: HOSPADM

## 2025-03-20 RX ORDER — ONDANSETRON 2 MG/ML
4 INJECTION INTRAMUSCULAR; INTRAVENOUS EVERY 6 HOURS PRN
Status: DISCONTINUED | OUTPATIENT
Start: 2025-03-20 | End: 2025-03-21 | Stop reason: HOSPADM

## 2025-03-20 RX ORDER — SEVOFLURANE 250 ML/250ML
1 LIQUID RESPIRATORY (INHALATION) CONTINUOUS PRN
Status: DISCONTINUED | OUTPATIENT
Start: 2025-03-20 | End: 2025-03-21 | Stop reason: HOSPADM

## 2025-03-20 RX ORDER — FENTANYL CITRATE 50 UG/ML
25 INJECTION, SOLUTION INTRAMUSCULAR; INTRAVENOUS
Status: DISCONTINUED | OUTPATIENT
Start: 2025-03-20 | End: 2025-03-21 | Stop reason: HOSPADM

## 2025-03-20 RX ORDER — TERBUTALINE SULFATE 1 MG/ML
0.25 INJECTION SUBCUTANEOUS
Status: DISCONTINUED | OUTPATIENT
Start: 2025-03-20 | End: 2025-03-21 | Stop reason: HOSPADM

## 2025-03-20 RX ORDER — LIDOCAINE HYDROCHLORIDE 10 MG/ML
30 INJECTION, SOLUTION EPIDURAL; INFILTRATION; INTRACAUDAL; PERINEURAL PRN
Status: DISCONTINUED | OUTPATIENT
Start: 2025-03-20 | End: 2025-03-21 | Stop reason: HOSPADM

## 2025-03-20 RX ORDER — MISOPROSTOL 200 UG/1
400 TABLET ORAL PRN
Status: DISCONTINUED | OUTPATIENT
Start: 2025-03-20 | End: 2025-03-21 | Stop reason: HOSPADM

## 2025-03-20 RX ORDER — CALCIUM CARBONATE 500 MG/1
1000 TABLET, CHEWABLE ORAL 3 TIMES DAILY PRN
Status: DISCONTINUED | OUTPATIENT
Start: 2025-03-20 | End: 2025-03-21 | Stop reason: HOSPADM

## 2025-03-20 RX ORDER — SODIUM CHLORIDE 0.9 % (FLUSH) 0.9 %
5-40 SYRINGE (ML) INJECTION EVERY 12 HOURS SCHEDULED
Status: DISCONTINUED | OUTPATIENT
Start: 2025-03-20 | End: 2025-03-21 | Stop reason: ALTCHOICE

## 2025-03-20 RX ORDER — CARBOPROST TROMETHAMINE 250 UG/ML
250 INJECTION, SOLUTION INTRAMUSCULAR PRN
Status: DISCONTINUED | OUTPATIENT
Start: 2025-03-20 | End: 2025-03-21 | Stop reason: HOSPADM

## 2025-03-20 RX ORDER — SODIUM CHLORIDE 9 MG/ML
INJECTION, SOLUTION INTRAVENOUS PRN
Status: DISCONTINUED | OUTPATIENT
Start: 2025-03-20 | End: 2025-03-21 | Stop reason: HOSPADM

## 2025-03-20 RX ORDER — LIDOCAINE HYDROCHLORIDE AND EPINEPHRINE 10; 10 MG/ML; UG/ML
INJECTION, SOLUTION INFILTRATION; PERINEURAL
Status: DISCONTINUED | OUTPATIENT
Start: 2025-03-20 | End: 2025-03-20 | Stop reason: SDUPTHER

## 2025-03-20 RX ORDER — TRANEXAMIC ACID 10 MG/ML
1000 INJECTION, SOLUTION INTRAVENOUS
Status: DISCONTINUED | OUTPATIENT
Start: 2025-03-20 | End: 2025-03-21 | Stop reason: HOSPADM

## 2025-03-20 RX ORDER — FAMOTIDINE 20 MG/1
20 TABLET, FILM COATED ORAL 2 TIMES DAILY
Status: DISCONTINUED | OUTPATIENT
Start: 2025-03-20 | End: 2025-03-21 | Stop reason: HOSPADM

## 2025-03-20 RX ORDER — FENTANYL/BUPIVACAINE/NS/PF 2-1250MCG
10 PLASTIC BAG, INJECTION (ML) INJECTION CONTINUOUS
Refills: 0 | Status: DISCONTINUED | OUTPATIENT
Start: 2025-03-20 | End: 2025-03-21 | Stop reason: HOSPADM

## 2025-03-20 RX ORDER — METHYLERGONOVINE MALEATE 0.2 MG/ML
200 INJECTION INTRAVENOUS PRN
Status: DISCONTINUED | OUTPATIENT
Start: 2025-03-20 | End: 2025-03-21 | Stop reason: HOSPADM

## 2025-03-20 RX ORDER — NALOXONE HYDROCHLORIDE 0.4 MG/ML
INJECTION, SOLUTION INTRAMUSCULAR; INTRAVENOUS; SUBCUTANEOUS PRN
Status: DISCONTINUED | OUTPATIENT
Start: 2025-03-20 | End: 2025-03-21 | Stop reason: HOSPADM

## 2025-03-20 RX ORDER — DOCUSATE SODIUM 100 MG/1
100 CAPSULE, LIQUID FILLED ORAL 2 TIMES DAILY
Status: DISCONTINUED | OUTPATIENT
Start: 2025-03-20 | End: 2025-03-21 | Stop reason: HOSPADM

## 2025-03-20 RX ORDER — ONDANSETRON 4 MG/1
4 TABLET, ORALLY DISINTEGRATING ORAL EVERY 6 HOURS PRN
Status: DISCONTINUED | OUTPATIENT
Start: 2025-03-20 | End: 2025-03-21 | Stop reason: HOSPADM

## 2025-03-20 RX ORDER — OXYCODONE HYDROCHLORIDE 5 MG/1
5 TABLET ORAL EVERY 4 HOURS PRN
Status: DISCONTINUED | OUTPATIENT
Start: 2025-03-20 | End: 2025-03-21 | Stop reason: HOSPADM

## 2025-03-20 RX ORDER — SODIUM CHLORIDE, SODIUM LACTATE, POTASSIUM CHLORIDE, CALCIUM CHLORIDE 600; 310; 30; 20 MG/100ML; MG/100ML; MG/100ML; MG/100ML
INJECTION, SOLUTION INTRAVENOUS CONTINUOUS
Status: DISCONTINUED | OUTPATIENT
Start: 2025-03-20 | End: 2025-03-21 | Stop reason: HOSPADM

## 2025-03-20 RX ORDER — BUPIVACAINE HYDROCHLORIDE 2.5 MG/ML
INJECTION, SOLUTION EPIDURAL; INFILTRATION; INTRACAUDAL; PERINEURAL
Status: DISCONTINUED | OUTPATIENT
Start: 2025-03-20 | End: 2025-03-20 | Stop reason: SDUPTHER

## 2025-03-20 RX ORDER — ONDANSETRON 2 MG/ML
4 INJECTION INTRAMUSCULAR; INTRAVENOUS EVERY 6 HOURS PRN
Status: DISCONTINUED | OUTPATIENT
Start: 2025-03-20 | End: 2025-03-20 | Stop reason: SDUPTHER

## 2025-03-20 RX ORDER — ONDANSETRON 4 MG/1
4 TABLET, ORALLY DISINTEGRATING ORAL EVERY 8 HOURS PRN
Status: DISCONTINUED | OUTPATIENT
Start: 2025-03-20 | End: 2025-03-21 | Stop reason: HOSPADM

## 2025-03-20 RX ORDER — OXYCODONE HYDROCHLORIDE 5 MG/1
10 TABLET ORAL EVERY 4 HOURS PRN
Status: DISCONTINUED | OUTPATIENT
Start: 2025-03-20 | End: 2025-03-21 | Stop reason: HOSPADM

## 2025-03-20 RX ORDER — SODIUM CHLORIDE, SODIUM LACTATE, POTASSIUM CHLORIDE, AND CALCIUM CHLORIDE .6; .31; .03; .02 G/100ML; G/100ML; G/100ML; G/100ML
1000 INJECTION, SOLUTION INTRAVENOUS PRN
Status: DISCONTINUED | OUTPATIENT
Start: 2025-03-20 | End: 2025-03-21 | Stop reason: HOSPADM

## 2025-03-20 RX ORDER — ACETAMINOPHEN 500 MG
1000 TABLET ORAL EVERY 8 HOURS SCHEDULED
Status: DISCONTINUED | OUTPATIENT
Start: 2025-03-20 | End: 2025-03-21 | Stop reason: HOSPADM

## 2025-03-20 RX ORDER — ACETAMINOPHEN 500 MG
1000 TABLET ORAL EVERY 8 HOURS SCHEDULED
Status: DISCONTINUED | OUTPATIENT
Start: 2025-03-20 | End: 2025-03-20

## 2025-03-20 RX ORDER — DOCUSATE SODIUM 100 MG/1
100 CAPSULE, LIQUID FILLED ORAL 2 TIMES DAILY
Status: DISCONTINUED | OUTPATIENT
Start: 2025-03-20 | End: 2025-03-21

## 2025-03-20 RX ORDER — SODIUM CHLORIDE, SODIUM LACTATE, POTASSIUM CHLORIDE, AND CALCIUM CHLORIDE .6; .31; .03; .02 G/100ML; G/100ML; G/100ML; G/100ML
500 INJECTION, SOLUTION INTRAVENOUS PRN
Status: DISCONTINUED | OUTPATIENT
Start: 2025-03-20 | End: 2025-03-21 | Stop reason: HOSPADM

## 2025-03-20 RX ADMIN — OXYTOCIN 2 MILLI-UNITS/MIN: 10 INJECTION, SOLUTION INTRAMUSCULAR; INTRAVENOUS at 06:36

## 2025-03-20 RX ADMIN — BUPIVACAINE HYDROCHLORIDE 10 ML: 2.5 INJECTION, SOLUTION EPIDURAL; INFILTRATION; INTRACAUDAL; PERINEURAL at 09:45

## 2025-03-20 RX ADMIN — Medication 10 ML/HR: at 09:55

## 2025-03-20 RX ADMIN — SODIUM CHLORIDE, SODIUM LACTATE, POTASSIUM CHLORIDE, AND CALCIUM CHLORIDE: .6; .31; .03; .02 INJECTION, SOLUTION INTRAVENOUS at 06:29

## 2025-03-20 RX ADMIN — BUPIVACAINE HYDROCHLORIDE 7 ML: 2.5 INJECTION, SOLUTION EPIDURAL; INFILTRATION; INTRACAUDAL; PERINEURAL at 14:31

## 2025-03-20 RX ADMIN — OXYTOCIN 250 MILLI-UNITS/MIN: 10 INJECTION, SOLUTION INTRAMUSCULAR; INTRAVENOUS at 19:42

## 2025-03-20 RX ADMIN — LIDOCAINE HYDROCHLORIDE,EPINEPHRINE BITARTRATE 4 ML: 10; .01 INJECTION, SOLUTION INFILTRATION; PERINEURAL at 16:10

## 2025-03-20 RX ADMIN — BUPIVACAINE HYDROCHLORIDE 4 ML: 2.5 INJECTION, SOLUTION EPIDURAL; INFILTRATION; INTRACAUDAL; PERINEURAL at 16:10

## 2025-03-20 RX ADMIN — ANTACID TABLETS 1000 MG: 500 TABLET, CHEWABLE ORAL at 16:55

## 2025-03-20 RX ADMIN — PENICILLIN G POTASSIUM 5 MILLION UNITS: 5000000 INJECTION, POWDER, FOR SOLUTION INTRAMUSCULAR; INTRAVENOUS at 06:38

## 2025-03-20 RX ADMIN — SODIUM CHLORIDE, SODIUM LACTATE, POTASSIUM CHLORIDE, AND CALCIUM CHLORIDE: .6; .31; .03; .02 INJECTION, SOLUTION INTRAVENOUS at 14:38

## 2025-03-20 RX ADMIN — LIDOCAINE HYDROCHLORIDE,EPINEPHRINE BITARTRATE 3 ML: 10; .01 INJECTION, SOLUTION INFILTRATION; PERINEURAL at 09:47

## 2025-03-20 RX ADMIN — SODIUM CHLORIDE 2.5 MILLION UNITS: 9 INJECTION, SOLUTION INTRAVENOUS at 10:18

## 2025-03-20 RX ADMIN — Medication 10 ML/HR: at 15:43

## 2025-03-20 RX ADMIN — SODIUM CHLORIDE 2.5 MILLION UNITS: 9 INJECTION, SOLUTION INTRAVENOUS at 14:39

## 2025-03-20 RX ADMIN — WATER 2000 MG: 1 INJECTION INTRAMUSCULAR; INTRAVENOUS; SUBCUTANEOUS at 19:54

## 2025-03-20 RX ADMIN — ACETAMINOPHEN 1000 MG: 500 TABLET ORAL at 20:14

## 2025-03-20 RX ADMIN — SODIUM CHLORIDE 2.5 MILLION UNITS: 9 INJECTION, SOLUTION INTRAVENOUS at 18:28

## 2025-03-20 ASSESSMENT — PAIN SCALES - GENERAL: PAINLEVEL_OUTOF10: 0

## 2025-03-20 NOTE — ANESTHESIA PROCEDURE NOTES
Epidural Block    Patient location during procedure: OB  Start time: 3/20/2025 9:41 AM  End time: 3/20/2025 9:53 AM  Reason for block: labor epidural  Staffing  Performed: anesthesiologist   Performed by: Jose Eduardo Madsen MD  Authorized by: Jose Eduardo Madsen MD    Epidural  Patient position: sitting  Prep: Betadine  Patient monitoring: continuous pulse ox and frequent blood pressure checks  Location: L3-4  Injection technique: ROGERS saline  Provider prep: mask and sterile gloves  Needle  Needle type: Tuohy   Needle gauge: 17 G  Catheter type: multi-orifice  Catheter size: 20 G  Test dose: negativeCatheter Secured: tape and tegaderm  Assessment  Hemodynamics: stable  Attempts: 1  Outcomes: uncomplicated  Preanesthetic Checklist  Completed: patient identified, risks and benefits discussed, surgical/procedural consents, pre-op evaluation, timeout performed, anesthesia consent given, oxygen available and monitors applied/VS acknowledged

## 2025-03-20 NOTE — H&P
History & Physical    Name: Ashley Guevara MRN: [id number]  SSN: [id number]    YOB: 1990  Age: 35 y.o.  Sex: female        Subjective:     Estimated Date of Delivery: 25  OB History          3    Para   2    Term   2            AB        Living   2         SAB        IAB        Ectopic        Molar        Multiple        Live Births   2                Ms. Guevara is admitted with pregnancy at 37w4d for induction of labor. Prenatal course was complicated by  AMA, CHTN, gestational thrombocytopenia, desmoid tumor . Please see prenatal records for details.Delivery recommended by [facility] due to growing tumor and need for care to manage. Sees oncology at [facility].     Patient Active Problem List    Diagnosis Date Noted    Encounter for elective induction of labor 2025    Supervision of high-risk pregnancy of elderly multigravida 2024     Overview Note:     EDC 2025 D=US  NIPTS normal female  Horizon neg prior preg  AMA 35 at delivery - [facility] 12 weeks  Desmoid fibromatosis - [place]  Hx of CS breech, then  8# - desires   CHTN post chemo - no meds  Baseline PCR 0.1  Baby ASA 12 weeks  Hx of gestational thrombocytopenia - 95k 35 weeks  Varicella immune   GBS pos urine   Flu vaccine 24  Coccyx pain  Measles immune  Bilateral salpingectomy if has CS approved Mae 3/5/25                   Past Medical History:   Diagnosis Date    Abnormal Papanicolaou smear of cervix     Desmoid fibromatosis     hx of chemo and cryoablation    Psychiatric problem     ADD     Past Surgical History:   Procedure Laterality Date     SECTION  2018    COLPOSCOPY  2016    neg path    OTHER SURGICAL HISTORY      [place] teeth    WISDOM TOOTH EXTRACTION       Social History     Occupational History    Not on file   Tobacco Use    Smoking status: Never     Passive exposure: Never    Smokeless tobacco: Never   Vaping Use    Vaping status: Never Used   Substance and Sexual Activity

## 2025-03-20 NOTE — ANESTHESIA PRE PROCEDURE
Department of Anesthesiology  Preprocedure Note       Name:  Ashley Guevara   Age:  35 y.o.  :  1990                                          MRN:  [id number]         Date:  3/20/2025      Surgeon: * No surgeons listed *    Procedure: * No procedures listed *    Medications prior to admission:   Prior to Admission medications    Medication Sig Start Date End Date Taking? Authorizing Provider   Ferrous Sulfate Dried (SLOW IRON PO) Take by mouth   Yes Marty Contreras MD   Esomeprazole Magnesium (NEXIUM PO) Take by mouth   Yes Marty Contreras MD   aspirin 81 MG EC tablet Take 1 tablet by mouth daily   Yes Marty Contreras MD   Prenatal Vit-Fe Fumarate-FA (PRENATAL VITAMIN) 27-0.8 MG TABS Take 1 tablet by mouth daily 24  Yes Nata Marte MD   amphetamine-dextroamphetamine (ADDERALL) 20 MG tablet Take 1 tablet by mouth 2 times daily. 19  Yes Automatic Reconciliation, Ar   dexAMETHasone 20 MG TABS Take 40 mg by mouth daily ( 2 pills)  Patient not taking: Reported on 3/17/2025 3/12/25   Nata Marte MD       Current medications:    Current Facility-Administered Medications   Medication Dose Route Frequency Provider Last Rate Last Admin   • lactated ringers infusion   IntraVENous Continuous Nata Marte  mL/hr at 25 0953 Rate Change at 25 0953   • lactated ringers bolus 500 mL  500 mL IntraVENous PRN Nata Marte MD        Or   • lactated ringers bolus 1,000 mL  1,000 mL IntraVENous PRN Nata Marte MD       • sodium chloride flush 0.9 % injection 5-40 mL  5-40 mL IntraVENous 2 times per day Nata Marte MD       • sodium chloride flush 0.9 % injection 5-40 mL  5-40 mL IntraVENous PRN Nata Marte MD       • 0.9 % sodium chloride infusion   IntraVENous PRN Nata Marte MD       • oxytocin (PITOCIN) 30 units in 500 mL infusion  1-20 james-units/min IntraVENous Continuous Nata Marte MD 10 mL/hr at 25 0948 10 james-units/min at 25 0948   •

## 2025-03-20 NOTE — ANESTHESIA PROCEDURE NOTES
Epidural Block    Patient location during procedure: OB  Start time: 3/20/2025 5:08 PM  End time: 3/20/2025 5:25 PM  Reason for block: labor epidural  Staffing  Performed: anesthesiologist   Anesthesiologist: Ibrahima Alba MD  Performed by: Ibrahima Alba MD  Authorized by: Jose Eduardo Madsen MD    Epidural  Patient position: sitting  Prep: Betadine  Patient monitoring: continuous pulse ox and frequent blood pressure checks  Approach: midline  Location: L3-4  Injection technique: ROGERS saline  Provider prep: mask and sterile gloves  Needle  Needle type: Tuohy   Needle gauge: 17 G  Catheter type: multi-orifice  Catheter size: 20 G  Test dose: negativeCatheter Secured: tegaderm and tape  Assessment  Hemodynamics: stable  Attempts: 1  Outcomes: uncomplicated and patient tolerated procedure well  Preanesthetic Checklist  Completed: patient identified, timeout performed and anesthesia consent given

## 2025-03-21 VITALS
BODY MASS INDEX: 35.34 KG/M2 | HEART RATE: 96 BPM | WEIGHT: 180 LBS | RESPIRATION RATE: 16 BRPM | TEMPERATURE: 98.2 F | DIASTOLIC BLOOD PRESSURE: 83 MMHG | SYSTOLIC BLOOD PRESSURE: 126 MMHG | HEIGHT: 60 IN | OXYGEN SATURATION: 97 %

## 2025-03-21 PROBLEM — O99.113 BENIGN GESTATIONAL THROMBOCYTOPENIA IN THIRD TRIMESTER: Status: ACTIVE | Noted: 2025-03-21

## 2025-03-21 PROBLEM — D48.117: Status: ACTIVE | Noted: 2025-03-21

## 2025-03-21 PROBLEM — O16.3 HYPERTENSION AFFECTING PREGNANCY IN THIRD TRIMESTER: Status: ACTIVE | Noted: 2025-03-21

## 2025-03-21 PROBLEM — Z3A.37 37 WEEKS GESTATION OF PREGNANCY: Status: ACTIVE | Noted: 2025-03-21

## 2025-03-21 PROBLEM — D69.6 BENIGN GESTATIONAL THROMBOCYTOPENIA IN THIRD TRIMESTER: Status: ACTIVE | Noted: 2025-03-21

## 2025-03-21 LAB
HGB BLD-MCNC: 11.2 G/DL (ref 11.5–16)
T PALLIDUM AB SER QL IA: NON REACTIVE

## 2025-03-21 PROCEDURE — 94761 N-INVAS EAR/PLS OXIMETRY MLT: CPT

## 2025-03-21 PROCEDURE — 85018 HEMOGLOBIN: CPT

## 2025-03-21 PROCEDURE — 3E0R3BZ INTRODUCTION OF ANESTHETIC AGENT INTO SPINAL CANAL, PERCUTANEOUS APPROACH: ICD-10-PCS | Performed by: OBSTETRICS & GYNECOLOGY

## 2025-03-21 PROCEDURE — 36415 COLL VENOUS BLD VENIPUNCTURE: CPT

## 2025-03-21 PROCEDURE — 3E033VJ INTRODUCTION OF OTHER HORMONE INTO PERIPHERAL VEIN, PERCUTANEOUS APPROACH: ICD-10-PCS | Performed by: OBSTETRICS & GYNECOLOGY

## 2025-03-21 PROCEDURE — 10907ZC DRAINAGE OF AMNIOTIC FLUID, THERAPEUTIC FROM PRODUCTS OF CONCEPTION, VIA NATURAL OR ARTIFICIAL OPENING: ICD-10-PCS | Performed by: OBSTETRICS & GYNECOLOGY

## 2025-03-21 PROCEDURE — 4A1HXCZ MONITORING OF PRODUCTS OF CONCEPTION, CARDIAC RATE, EXTERNAL APPROACH: ICD-10-PCS | Performed by: OBSTETRICS & GYNECOLOGY

## 2025-03-21 PROCEDURE — 6370000000 HC RX 637 (ALT 250 FOR IP): Performed by: OBSTETRICS & GYNECOLOGY

## 2025-03-21 RX ORDER — IBUPROFEN 800 MG/1
800 TABLET, FILM COATED ORAL EVERY 8 HOURS PRN
Qty: 30 TABLET | Refills: 1 | Status: SHIPPED | OUTPATIENT
Start: 2025-03-21

## 2025-03-21 RX ADMIN — ACETAMINOPHEN 1000 MG: 500 TABLET ORAL at 13:25

## 2025-03-21 RX ADMIN — ACETAMINOPHEN 1000 MG: 500 TABLET ORAL at 06:12

## 2025-03-21 RX ADMIN — IBUPROFEN 800 MG: 800 TABLET, FILM COATED ORAL at 00:17

## 2025-03-21 RX ADMIN — IBUPROFEN 800 MG: 800 TABLET, FILM COATED ORAL at 06:11

## 2025-03-21 RX ADMIN — DOCUSATE SODIUM 100 MG: 100 CAPSULE, LIQUID FILLED ORAL at 06:11

## 2025-03-21 RX ADMIN — IBUPROFEN 800 MG: 800 TABLET, FILM COATED ORAL at 13:25

## 2025-03-21 ASSESSMENT — PAIN DESCRIPTION - ORIENTATION
ORIENTATION: LOWER

## 2025-03-21 ASSESSMENT — PAIN DESCRIPTION - LOCATION
LOCATION: ABDOMEN

## 2025-03-21 ASSESSMENT — PAIN DESCRIPTION - DESCRIPTORS
DESCRIPTORS: ACHING;DISCOMFORT;SORE
DESCRIPTORS: ACHING;SORE
DESCRIPTORS: SORE

## 2025-03-21 ASSESSMENT — PAIN - FUNCTIONAL ASSESSMENT
PAIN_FUNCTIONAL_ASSESSMENT: ACTIVITIES ARE NOT PREVENTED

## 2025-03-21 ASSESSMENT — PAIN SCALES - GENERAL
PAINLEVEL_OUTOF10: 3
PAINLEVEL_OUTOF10: 4
PAINLEVEL_OUTOF10: 4

## 2025-03-21 NOTE — LACTATION NOTE
This note was copied from a baby's chart.     25 0944   Visit Information   Lactation Consult Visit Type IP Initial Consult   Visit Length 30 minutes   Reason for Visit Education;Normal [place] Visit   Breast Feeding History/Assessment   Left Breast Soft   Left Nipple Protrude   Right Nipple Protrude   Right Breast Soft   Breastfeeding History Yes   Longest duration (#) 24   Longest Duration months   Complications No   Feeding Assessment: Maternal Factors   Position and Latch Independently   Care Plan/Breast Care   Breast Care Encouraged to wear bra   Lactation Comment Lactation education and support   This is mother's third baby to breastfeed, she breast  fed her first two babies foe 24 months.  Experienced mother reports that breastfeeding is going well. Mother states easily expressing drops of colostrum prior to feeds. Mother is feeding frequently on demand, I&O is adequate. Discussed adequate output for day of life, and measured for flange size. Mother has various pumps at home. Breastfeeding booklet reviewed. Warm line number provided. Mother declines lactation assist, she is relaxed and confident.    Pt will successfully establish breastfeeding by feeding in response to early feeding cues   or wake every 3h, will obtain deep latch, and will keep log of feedings/output.  Taught to BF at hunger cues and or q 2-3 hrs and to offer 10-20 drops of hand expressed colostrum at any non-feeds.      Reviewed breastfeeding basics:  How milk is made and normal  breastfeeding behaviors discussed.  Supply and demand,  stomach size, early feeding cues, skin to skin bonding with comfortable positioning and baby led latch-on reviewed.  How to identify signs of successful breastfeeding sessions reviewed; education on asymetrical latch, signs of effective latching vs shallow, in-effective latching, normal  feeding frequency and duration and expected infant output discussed.  Normal course of

## 2025-03-21 NOTE — L&D DELIVERY NOTE
Intrapartum & Postpartum: 25 0700 - 25 1604    Delivery Admission: 25 0536 - 25 1604         Intrapartum & Postpartum Delivery Admission    Quantitative Blood Loss (mL) Hospital Encounter 382 grams 382 grams    Total  382 mL 382 mL               End of Mother's Information  Mother: Ashley Guevara #[id number]                Delivery Providers    Delivering clinician: Nata Marte MD     Provider Role    Nata Marte MD Obstetrician    Elzbieta Siegel, LATRELL Primary Nurse    [place], Marcia, RN Primary  Nurse    Taylor Martinez, LATRELL Nursery Nurse    Sulma Shepherd RN Registered Nurse    Sadie Gross, RN Registered Nurse               Assessment    Living Status: Living  Delivery Location Comment: ROOM 210        Skin Color:   Heart Rate:   Reflex Irritability:   Muscle Tone:   Respiratory Effort:   Total:            1 Minute:    1    2    2    2    2    9         5 Minute:    1    2    2    2    2    9                                        Apgars Assigned By: RACHEL GAXIOLA RN              Resuscitation    Method: Bulb Suction, Stimulation, Room Air             [place] Measurements      Birth Weight: 3530 g   Birth Length: 50.8 cm     Head Circumference: 35 cm     Chest Circumference: 34 cm     Abdominal Girth: 33.5 cm              Skin to Skin      Skin to Skin Initiation Date/Time: 3/20/25 19:00:00 EDT     Skin to Skin With: Mother

## 2025-03-21 NOTE — DISCHARGE SUMMARY
Obstetrical Discharge Summary     Name: Ashley Guevara MRN: [id number]  SSN: [id number]    YOB: 1990  Age: 35 y.o.  Sex: female      Admit Date: 3/20/2025    Discharge Date: 3/21/2025     Admitting Physician: Nata Marte MD     Attending Physician:  Nata Marte MD     Admission Diagnoses: Encounter for elective induction of labor [Z34.90]    Discharge Diagnoses:   Information for the patient's :  Lavonne Guevara [[id number]]   @[phone]@     Delivery Type: , Spontaneous   By Delivering Clinician:NATA MARTE   Delivery Date /Time: 3/20/2025 7:00 PM '    Additional Diagnoses:  No components found for: \"OBEXTABORH\", \"OBEXTABSCRN\", \"OBEXTRUBELLA\", \"OBEXTGRBS\"    Hospital Course: Normal hospital course following the delivery.    Disposition: Home  Condition: Good    Patient Instructions:   Current Discharge Medication List        START taking these medications    Details   ibuprofen (ADVIL;MOTRIN) 800 MG tablet Take 1 tablet by mouth every 8 hours as needed for Pain  Qty: 30 tablet, Refills: 1           CONTINUE these medications which have NOT CHANGED    Details   Esomeprazole Magnesium (NEXIUM PO) Take by mouth      Prenatal Vit-Fe Fumarate-FA (PRENATAL VITAMIN) 27-0.8 MG TABS Take 1 tablet by mouth daily  Qty: 90 tablet, Refills: 5    Comments: May substitute any prenatal vitamin with iron covered by insurance      amphetamine-dextroamphetamine (ADDERALL) 20 MG tablet Take 1 tablet by mouth 2 times daily.           STOP taking these medications       dexAMETHasone 20 MG TABS Comments:   Reason for Stopping:         Ferrous Sulfate Dried (SLOW IRON PO) Comments:   Reason for Stopping:         aspirin 81 MG EC tablet Comments:   Reason for Stopping:               Reference my discharge instructions.    Follow-up Information    None           Signed By:  Nata Marte MD     2025

## 2025-03-21 NOTE — ANESTHESIA POSTPROCEDURE EVALUATION
Department of Anesthesiology  Postprocedure Note    Patient: Ashley Guevara  MRN: [id number]  YOB: 1990  Date of evaluation: 3/21/2025    Procedure Summary       Date: 03/20/25 Room / Location:     Anesthesia Start: 0941 Anesthesia Stop: 1900    Procedure: Labor Analgesia Diagnosis:     Scheduled Providers:  Responsible Provider: Jose Eduardo Madsen MD    Anesthesia Type: epidural ASA Status: 2            Anesthesia Type: No value filed.    [place]ke Phase I:      Clarke Phase II:      Anesthesia Post Evaluation    Patient location during evaluation: bedside  Patient participation: complete - patient participated  Level of consciousness: awake  Airway patency: patent  Nausea & Vomiting: no vomiting and no nausea  Cardiovascular status: hemodynamically stable  Respiratory status: acceptable  Hydration status: stable  Pain management: adequate    No notable events documented.

## 2025-03-21 NOTE — DISCHARGE INSTRUCTIONS
POST DELIVERY DISCHARGE INSTRUCTIONS    Name: Ashley Guevara  YOB: 1990  Primary Diagnosis: [unfilled]    General:     Diet/Diet Restrictions:  Eight 8-ounce glasses of fluid daily (water, juices); avoid excessive caffeine intake.  Meals/snacks as desired which are high in fiber and carbohydrates and low in fat and cholesterol.      Physical Activity / Restrictions / Safety:     Avoid heavy lifting, no more that 8 lbs. For 2-3 weeks; No driving while taking narcotic pain medication. Post  patients should not drive until pain free.  No intercourse 4-6 weeks, no douching or tampon use. May resume exercise in 6 weeks.         Discharge Instructions/Special Treatment/Home Care Needs:     Continue prenatal vitamins.  Continue to use squirt bottle with warm water on your episiotomy after each bathroom use until bleeding stops.  If steri-strips applied to your incision, remove in 7 days.  Take stool softeners daily.    Call your doctor for the following:     Fever over 101 degrees by mouth.  Vaginal bleeding heavier than a normal menstrual period or lost larger than a golf ball.  Red streaks or increased swelling of legs, painful red streaks on your breast.  Painful urination, or increased pain, redness or discharge with your incision.    Pain Management:     Pain Management:   Take Acetaminophen (Tylenol) or Ibuprofen (Advil, Motrin), as directed for pain. Use a warm Sitz bath 3 times daily to relieve episiotomy or hemorrhoidal discomfort. Heating pad to  incision as needed. For hemorrhoidal discomfort, use Tucks and Anusol cream as needed and directed.    Follow-Up Care:     Pt to scheduled follow-up appt in 6 weeks    Telephone number: [phone]    Signed By: Nata Marte MD                                                                                                   Date: 3/21/2025 Time: 4:09 PM

## 2025-03-22 NOTE — PROGRESS NOTES
0550- Pt arrives to the unit ambulatory and stable for scheduled induction    0805- MD Marte reviewed EFM and fetal heart rate baseline of 155-160    0941- MD Madsen at bedside to place epidural     0944- time out     1036- MD Marte at bedside to do SVE, AROM pt and place IUPC    1151- This RN notified MD Marte that this RN is having a hard time picking up contractions on IUPC. MD Marte states she will come replace IUPC    1219- MD Marte replacing IUPC     1327- MD Marte aware IUPC still showing weak contractions. Order from MD Marte to keep pitocin at 16mL/hr and MD will come recheck cervix.     1413- Bedside shift change report given to Robbin RN (oncoming nurse) by Akila RN (offgoing nurse). Report included the following information Nurse Handoff Report, Adult Overview, Intake/Output, MAR, and Recent Results.     
1400: Bedside and Verbal shift change report given to Robbin RN (oncoming nurse) by LATRELL Tran (offgoing nurse). Report included the following information Nurse Handoff Report, Index, Adult Overview, Surgery Report, Intake/Output, MAR, and Recent Results. ]    1421: SVE per . P 7/100/-1.     1539: Pt having complaints of incisional pain. Pitocin cut off.  notified.     1540: SVE per LATRELL marie. Pt remains unchanged.     1600:  at the bedside. SVE. Pt still remains unchanged.     1612:  at the bedside rebolusing patient.     1711:  at bedside for epidural placement. Time out at this time.     1754: Per.  go up on pitocin at this time.     1851: SVE per . Pt complete.     1854: Patient actively pushing.  RN remains in continuous attendance at the bedside.  Assessment & evaluation of fetal heart rate ongoing via continuous EFM.    1900: RN remained at bedside throughout pushing.  EFM continuously assessed.  Vaginal delivery of viable infant.      1920: Bedside and Verbal shift change report given to LATRELL wilson (oncoming nurse) by LATRELL Siegel (offgoing nurse). Report included the following information Nurse Handoff Report, Index, Adult Overview, Surgery Report, Intake/Output, MAR, and Recent Results.           
2005: This RN went to pt's room to check on her and baby and see if they were ready to be taken downstairs for discharge. Pt's room was empty. Day shift RN had already completed everything for discharge. This RN was only waiting for pt to feed baby and take downstairs in wheelchair after car seat check.     2017: Called to check on pt - states she left hospital with baby and is safely in car on the way home. Pt confirms that she has copy of all discharge paperwork and instructions.   
: Bedside and Verbal shift change report given to Sadie SAMS (oncoming nurse) by Elzbieta SAMS (offgoing nurse). Report included the following information Nurse Handoff Report, Index, Adult Overview, Intake/Output, MAR, Recent Results, and Quality Measures.     : Assumed care of pt immediately postpartum with  and  at bedside. Pt denies headaches, vision changes, epigastric pain, and/or N/V.     : TRANSFER - OUT REPORT:    Verbal report given to Eden SAMS on Ashley Guevara  being transferred to MIU for routine progression of patient care       Report consisted of patient's Situation, Background, Assessment and   Recommendations(SBAR).     Information from the following report(s) Nurse Handoff Report, Index, Adult Overview, Intake/Output, MAR, Recent Results, and Quality Measures was reviewed with the receiving nurse.           Lines:   Peripheral IV 25 Left;Posterior Hand (Active)   Site Assessment Clean, dry & intact 25   Line Status Infusing 25   Line Care Connections checked and tightened 25   Phlebitis Assessment No symptoms 25   Infiltration Assessment 0 25   Alcohol Cap Used No 25   Dressing Status Clean, dry & intact 25   Dressing Type Transparent 25        Opportunity for questions and clarification was provided.      Patient transported with:  Registered Nurse        
CTSP for pain. Epidural basically has worn off. She can move her legs freely. Pain is all over her abdomen/fundus not necessarily at scar. Pitocin was turned off shortly while I was helping another MD in OR    Cervix unchanged C/7  T Cat 1    Will have anesthesia vicki  
Labor Progress Note  Patient seen, fetal heart rate and contraction pattern evaluated, patient examined.  /73   Pulse 84   Temp 98.6 °F (37 °C) (Oral)   Resp 18   Ht 1.524 m (5')   Wt 81.6 kg (180 lb)   LMP 06/30/2024 (Exact Date)   SpO2 98%   BMI 35.15 kg/m²     Physical Exam:  Cervical Exam:  5 cm dilated    100% effaced    -2 station    Membranes:  Artificial Rupture of Membranes; Amniotic Fluid: medium amount of clear fluid  Uterine Activity: Frequency: Every 3-5 minutes  Fetal Heart Rate: Reactive    Assessment/Plan:  Reassuring fetal status, Continue plan for vaginal delivery  IUPC placed.             
03/20/2025 05:59 AM    HCT 33.0 03/17/2025 11:14 AM    HCT 36.5 03/11/2025 10:41 AM    HCT 36.4 03/04/2025 02:27 PM    HCT 36.5 02/18/2025 12:38 PM    HCT 32.1 01/13/2025 03:38 PM    HCT 40.3 08/20/2024 03:38 PM    HCT 40.8 11/17/2023 11:41 AM    HCT 41.9 09/13/2023 08:49 AM    HCT 36.3 05/24/2023 03:40 PM    HCT 38.2 01/18/2021 10:50 AM    HCT 36.5 01/13/2021 04:08 PM    HCT 36.8 01/08/2021 10:20 AM    HCT 36.9 12/30/2020 12:39 PM    HCT 35.1 12/22/2020 11:43 AM    HCT 35.2 10/29/2020 10:57 AM    HCT 40.9 05/29/2020 08:56 AM     03/20/2025 05:59 AM     03/17/2025 11:14 AM    PLT 84 03/11/2025 10:41 AM    PLT 95 03/04/2025 02:27 PM     02/18/2025 12:38 PM     01/13/2025 03:38 PM     08/20/2024 03:38 PM     11/17/2023 11:41 AM     09/13/2023 08:49 AM     05/24/2023 03:40 PM     01/18/2021 10:50 AM     01/13/2021 04:08 PM     01/08/2021 10:20 AM     12/30/2020 12:39 PM    PLT 98 12/22/2020 11:43 AM     10/29/2020 10:57 AM     05/29/2020 08:56 AM       Recent Results (from the past 24 hours)   Hemoglobin    Collection Time: 03/21/25  5:57 AM   Result Value Ref Range    Hemoglobin 11.2 (L) 11.5 - 16.0 g/dL       Assessment: Doing well, post partum day 1    Plan:  1. Continue routine postpartum and perineal care as well as maternal education.  Desires early discharge Can monitor BP at home      
none

## 2025-03-23 ENCOUNTER — RESULTS FOLLOW-UP (OUTPATIENT)
Age: 35
End: 2025-03-23

## 2025-04-24 NOTE — PROGRESS NOTES
Ashley Guevara is a 35 y.o. female returns for a routine post-partum follow-up visit     Chief Complaint   Patient presents with    Postpartum Care       Postpartum Depression: Low Risk  (3/21/2025)    [place]  Depression Scale     Last EPDS Total Score: 0     Last EPDS Self Harm Result: Never         Type of delivery:   Date of Delivery: 2025  Breastfeeding: yes  Bleeding Resolved: yes  Birth Control: none.  Last Pap: normal HPV negative obtained 10/20.2022        Problems: no problems    1. Have you been to the ER, urgent care clinic, or hospitalized since your last visit? No    2. Have you seen or consulted any other health care providers outside of the [facility] since your last visit? No    Examination chaperoned by Giovanny Mary MA.

## 2025-05-06 ENCOUNTER — OFFICE VISIT (OUTPATIENT)
Age: 35
End: 2025-05-06

## 2025-05-06 VITALS
BODY MASS INDEX: 31.92 KG/M2 | HEIGHT: 60 IN | HEART RATE: 114 BPM | DIASTOLIC BLOOD PRESSURE: 89 MMHG | WEIGHT: 162.6 LBS | SYSTOLIC BLOOD PRESSURE: 124 MMHG | OXYGEN SATURATION: 99 %

## 2025-05-06 PROBLEM — O99.113 BENIGN GESTATIONAL THROMBOCYTOPENIA IN THIRD TRIMESTER: Status: RESOLVED | Noted: 2025-03-21 | Resolved: 2025-05-06

## 2025-05-06 PROBLEM — O09.529 SUPERVISION OF HIGH-RISK PREGNANCY OF ELDERLY MULTIGRAVIDA: Status: RESOLVED | Noted: 2024-08-20 | Resolved: 2025-05-06

## 2025-05-06 PROBLEM — D69.6 BENIGN GESTATIONAL THROMBOCYTOPENIA IN THIRD TRIMESTER: Status: RESOLVED | Noted: 2025-03-21 | Resolved: 2025-05-06

## 2025-05-06 PROCEDURE — 0503F POSTPARTUM CARE VISIT: CPT | Performed by: OBSTETRICS & GYNECOLOGY

## 2025-05-06 NOTE — PROGRESS NOTES
Postpartum evaluation  Type of delivery:   Date of Delivery: 2025  Breastfeeding: yes  Bleeding Resolved: yes  Birth Control: none.  Last Pap: normal HPV negative obtained 10/20.2022        Ashley Guevara is a 35 y.o. female who presents for a postpartum exam.     She is now six weeks post .    Her baby is doing well.    She has had no menses since delivery.     She has had the following significant problems since her delivery: none    The patient is breast feeding without difficulty.        She is currently taking: no medications.          /89 (BP Site: Right Upper Arm, Patient Position: Sitting, BP Cuff Size: Large Adult)   Pulse (!) 114   Ht 1.524 m (5')   Wt 73.8 kg (162 lb 9.6 oz)   LMP 2024 (Approximate)   SpO2 99%   Breastfeeding Yes   BMI 31.76 kg/m²     PHYSICAL EXAMINATION    Constitutional  Appearance: well-nourished, well developed, alert, in no acute distress    HENT  Head and Face: appears normal    Neck  Inspection/Palpation: normal appearance, no masses or tenderness  Lymph Nodes: no lymphadenopathy present  Thyroid: gland size normal, nontender, no nodules or masses present on palpation    Breasts  Inspection of Breasts: breasts symmetrical, no skin changes, no discharge present, nipple appearance normal, no skin retraction present  Palpation of Breasts and Axillae: no masses present on palpation, no breast tenderness  Axillary Lymph Nodes: no lymphadenopathy present    Gastrointestinal  Abdominal Examination: abdomen non-tender to palpation, normal bowel sounds, no masses present  Liver and spleen: no hepatomegaly present, spleen not palpable  Hernias: no hernias identified    Genitourinary  External Genitalia: normal appearance for age, no discharge present, no tenderness present, no inflammatory lesions present, no masses present, no atrophy present  Vagina: normal vaginal vault without central or paravaginal defects, no discharge present, no inflammatory

## (undated) DEVICE — STERILE POLYISOPRENE POWDER-FREE SURGICAL GLOVES: Brand: PROTEXIS

## (undated) DEVICE — REM POLYHESIVE ADULT PATIENT RETURN ELECTRODE: Brand: VALLEYLAB

## (undated) DEVICE — SUTURE PDS II SZ 1 L36IN ABSRB VLT CT L40MM 1/2 CIR TAPR Z359T

## (undated) DEVICE — TRAY PREP DRY W/ PREM GLV 2 APPL 6 SPNG 2 UNDPD 1 OVERWRAP

## (undated) DEVICE — LARGE, DISPOSABLE ALEXIS O C-SECTION PROTECTOR - RETRACTOR: Brand: ALEXIS ® O C-SECTION PROTECTOR - RETRACTOR

## (undated) DEVICE — SUTURE VCRL SZ 0 L36IN ABSRB VLT L40MM CT 1/2 CIR J358H

## (undated) DEVICE — STAPLER SKIN SQ 30 ABSRB STPL DISP INSORB

## (undated) DEVICE — (D)PREP SKN CHLRAPRP APPL 26ML -- CONVERT TO ITEM 371833

## (undated) DEVICE — ABDOMINAL PAD: Brand: DERMACEA

## (undated) DEVICE — KENDALL SCD EXPRESS SLEEVES, KNEE LENGTH, MEDIUM: Brand: KENDALL SCD

## (undated) DEVICE — CATH FOLEY 16F LUBRI-SIL IC --

## (undated) DEVICE — ROCKER SWITCH PENCIL HOLSTER: Brand: VALLEYLAB

## (undated) DEVICE — BULB SYRINGE, IRRIGATION WITH PROTECTIVE CAP, 60 CC, INDIVIDUALLY WRAPPED: Brand: DOVER

## (undated) DEVICE — SOLUTION IV 1000ML 0.9% SOD CHL

## (undated) DEVICE — TIP CLEANER: Brand: VALLEYLAB

## (undated) DEVICE — HANDLE LT SNAP ON ULT DURABLE LENS FOR TRUMPF ALC DISPOSABLE

## (undated) DEVICE — (D)STRIP SKN CLSR 0.5X4IN WHT --

## (undated) DEVICE — C-SECTION II-LF: Brand: MEDLINE INDUSTRIES, INC.

## (undated) DEVICE — GOWN,AURORA,FABRIC-REINFORCED,X-LARGE: Brand: MEDLINE

## (undated) DEVICE — MASTISOL ADHESIVE LIQ 2/3ML

## (undated) DEVICE — TOWEL,OR,DSP,ST,BLUE,STD,2/PK,40PK/CS: Brand: MEDLINE

## (undated) DEVICE — 3000CC GUARDIAN II: Brand: GUARDIAN

## (undated) DEVICE — 3M™ MEDIPORE™ H SOFT CLOTH SURGICAL TAPE, 2863, 3 IN X 10 YD, 12/CASE: Brand: 3M™ MEDIPORE™